# Patient Record
Sex: FEMALE | Race: WHITE | NOT HISPANIC OR LATINO | Employment: UNEMPLOYED | URBAN - NONMETROPOLITAN AREA
[De-identification: names, ages, dates, MRNs, and addresses within clinical notes are randomized per-mention and may not be internally consistent; named-entity substitution may affect disease eponyms.]

---

## 2017-01-06 DIAGNOSIS — I25.118 CORONARY ARTERY DISEASE OF NATIVE ARTERY OF NATIVE HEART WITH STABLE ANGINA PECTORIS (HCC): Primary | ICD-10-CM

## 2017-01-11 ENCOUNTER — HOSPITAL ENCOUNTER (OUTPATIENT)
Facility: HOSPITAL | Age: 72
Discharge: HOME | End: 2017-01-12
Attending: INTERNAL MEDICINE | Admitting: INTERNAL MEDICINE

## 2017-01-11 DIAGNOSIS — I25.118 CORONARY ARTERY DISEASE OF NATIVE ARTERY OF NATIVE HEART WITH STABLE ANGINA PECTORIS (HCC): ICD-10-CM

## 2017-01-11 LAB
ACT BLD: 152 SECONDS (ref 82–152)
ACT BLD: 167 SECONDS (ref 82–152)
ACT BLD: 204 SECONDS (ref 82–152)
ACT BLD: 214 SECONDS (ref 82–152)
ACT BLD: 224 SECONDS (ref 82–152)
ACT BLD: 250 SECONDS (ref 82–152)
ANION GAP SERPL CALCULATED.3IONS-SCNC: 8.1 MMOL/L (ref 3.6–11.2)
BUN BLD-MCNC: 14 MG/DL (ref 7–21)
BUN/CREAT SERPL: 15.6 (ref 7–25)
CALCIUM SPEC-SCNC: 9.8 MG/DL (ref 7.7–10)
CHLORIDE SERPL-SCNC: 109 MMOL/L (ref 99–112)
CO2 SERPL-SCNC: 28.9 MMOL/L (ref 24.3–31.9)
CREAT BLD-MCNC: 0.9 MG/DL (ref 0.43–1.29)
DEPRECATED RDW RBC AUTO: 42.3 FL (ref 37–54)
ERYTHROCYTE [DISTWIDTH] IN BLOOD BY AUTOMATED COUNT: 12.7 % (ref 11.5–14.5)
GFR SERPL CREATININE-BSD FRML MDRD: 62 ML/MIN/1.73
GLUCOSE BLD-MCNC: 158 MG/DL (ref 70–110)
HCT VFR BLD AUTO: 36.8 % (ref 37–47)
HGB BLD-MCNC: 12.1 G/DL (ref 12–16)
INR PPP: 0.99 (ref 0.8–1.1)
MCH RBC QN AUTO: 30.9 PG (ref 27–33)
MCHC RBC AUTO-ENTMCNC: 32.9 G/DL (ref 33–37)
MCV RBC AUTO: 94.1 FL (ref 80–94)
OSMOLALITY SERPL CALC.SUM OF ELEC: 294.3 MOSM/KG (ref 273–305)
PLATELET # BLD AUTO: 229 10*3/MM3 (ref 130–400)
PMV BLD AUTO: 10.1 FL (ref 6–10)
POTASSIUM BLD-SCNC: 3.3 MMOL/L (ref 3.5–5.3)
PROTHROMBIN TIME: 11.2 SECONDS (ref 9.8–11.9)
RBC # BLD AUTO: 3.91 10*6/MM3 (ref 4.2–5.4)
SODIUM BLD-SCNC: 146 MMOL/L (ref 135–153)
WBC NRBC COR # BLD: 9.54 10*3/MM3 (ref 4.5–12.5)

## 2017-01-11 PROCEDURE — 85610 PROTHROMBIN TIME: CPT | Performed by: INTERNAL MEDICINE

## 2017-01-11 PROCEDURE — C1725 CATH, TRANSLUMIN NON-LASER: HCPCS | Performed by: INTERNAL MEDICINE

## 2017-01-11 PROCEDURE — C1757 CATH, THROMBECTOMY/EMBOLECT: HCPCS | Performed by: INTERNAL MEDICINE

## 2017-01-11 PROCEDURE — 25010000002 MIDAZOLAM PER 1 MG: Performed by: INTERNAL MEDICINE

## 2017-01-11 PROCEDURE — C1887 CATHETER, GUIDING: HCPCS | Performed by: INTERNAL MEDICINE

## 2017-01-11 PROCEDURE — 0 IOPAMIDOL PER 1 ML: Performed by: INTERNAL MEDICINE

## 2017-01-11 PROCEDURE — C1769 GUIDE WIRE: HCPCS | Performed by: INTERNAL MEDICINE

## 2017-01-11 PROCEDURE — 25010000002 ONDANSETRON PER 1 MG: Performed by: INTERNAL MEDICINE

## 2017-01-11 PROCEDURE — 93005 ELECTROCARDIOGRAM TRACING: CPT | Performed by: INTERNAL MEDICINE

## 2017-01-11 PROCEDURE — 92928 PRQ TCAT PLMT NTRAC ST 1 LES: CPT | Performed by: INTERNAL MEDICINE

## 2017-01-11 PROCEDURE — C9601 PERC DRUG-EL COR STENT BRAN: HCPCS | Performed by: INTERNAL MEDICINE

## 2017-01-11 PROCEDURE — 25010000002 FENTANYL CITRATE (PF) 100 MCG/2ML SOLUTION: Performed by: INTERNAL MEDICINE

## 2017-01-11 PROCEDURE — C9600 PERC DRUG-EL COR STENT SING: HCPCS | Performed by: INTERNAL MEDICINE

## 2017-01-11 PROCEDURE — 92929 PR PRQ TRLUML CORONARY STENT W/ANGIO ADDL ART/BRNCH: CPT | Performed by: INTERNAL MEDICINE

## 2017-01-11 PROCEDURE — G0378 HOSPITAL OBSERVATION PER HR: HCPCS

## 2017-01-11 PROCEDURE — 25010000002 HYDRALAZINE PER 20 MG: Performed by: INTERNAL MEDICINE

## 2017-01-11 PROCEDURE — 94799 UNLISTED PULMONARY SVC/PX: CPT

## 2017-01-11 PROCEDURE — S0260 H&P FOR SURGERY: HCPCS | Performed by: INTERNAL MEDICINE

## 2017-01-11 PROCEDURE — 25010000002 MORPHINE SULFATE (PF) 2 MG/ML SOLUTION

## 2017-01-11 PROCEDURE — C1874 STENT, COATED/COV W/DEL SYS: HCPCS | Performed by: INTERNAL MEDICINE

## 2017-01-11 PROCEDURE — 80048 BASIC METABOLIC PNL TOTAL CA: CPT | Performed by: INTERNAL MEDICINE

## 2017-01-11 PROCEDURE — 85027 COMPLETE CBC AUTOMATED: CPT | Performed by: INTERNAL MEDICINE

## 2017-01-11 PROCEDURE — 25010000002 HEPARIN (PORCINE) PER 1000 UNITS: Performed by: INTERNAL MEDICINE

## 2017-01-11 PROCEDURE — C1894 INTRO/SHEATH, NON-LASER: HCPCS | Performed by: INTERNAL MEDICINE

## 2017-01-11 PROCEDURE — 85347 COAGULATION TIME ACTIVATED: CPT

## 2017-01-11 DEVICE — XIENCE ALPINE EVEROLIMUS ELUTING CORONARY STENT SYSTEM 2.25 MM X 23 MM / RAPID-EXCHANGE
Type: IMPLANTABLE DEVICE | Status: FUNCTIONAL
Brand: XIENCE ALPINE

## 2017-01-11 DEVICE — XIENCE ALPINE EVEROLIMUS ELUTING CORONARY STENT SYSTEM 3.50 MM X 18 MM / RAPID-EXCHANGE
Type: IMPLANTABLE DEVICE | Status: FUNCTIONAL
Brand: XIENCE ALPINE

## 2017-01-11 RX ORDER — SODIUM CHLORIDE 9 MG/ML
INJECTION, SOLUTION INTRAVENOUS CONTINUOUS PRN
Status: DISCONTINUED | OUTPATIENT
Start: 2017-01-11 | End: 2017-01-11 | Stop reason: HOSPADM

## 2017-01-11 RX ORDER — ATORVASTATIN CALCIUM 20 MG/1
20 TABLET, FILM COATED ORAL NIGHTLY
Status: DISCONTINUED | OUTPATIENT
Start: 2017-01-11 | End: 2017-01-12 | Stop reason: HOSPADM

## 2017-01-11 RX ORDER — SIMVASTATIN 40 MG
40 TABLET ORAL NIGHTLY
COMMUNITY
End: 2017-01-12 | Stop reason: HOSPADM

## 2017-01-11 RX ORDER — SENNA AND DOCUSATE SODIUM 50; 8.6 MG/1; MG/1
2 TABLET, FILM COATED ORAL DAILY
Status: CANCELLED | OUTPATIENT
Start: 2017-01-12

## 2017-01-11 RX ORDER — LISINOPRIL AND HYDROCHLOROTHIAZIDE 25; 20 MG/1; MG/1
1 TABLET ORAL DAILY
Status: DISCONTINUED | OUTPATIENT
Start: 2017-01-12 | End: 2017-01-11 | Stop reason: SDUPTHER

## 2017-01-11 RX ORDER — MIDAZOLAM HYDROCHLORIDE 1 MG/ML
INJECTION INTRAMUSCULAR; INTRAVENOUS AS NEEDED
Status: DISCONTINUED | OUTPATIENT
Start: 2017-01-11 | End: 2017-01-11 | Stop reason: HOSPADM

## 2017-01-11 RX ORDER — ASPIRIN 325 MG
325 TABLET, DELAYED RELEASE (ENTERIC COATED) ORAL DAILY
Status: DISCONTINUED | OUTPATIENT
Start: 2017-01-11 | End: 2017-01-12 | Stop reason: HOSPADM

## 2017-01-11 RX ORDER — HYDRALAZINE HYDROCHLORIDE 20 MG/ML
INJECTION INTRAMUSCULAR; INTRAVENOUS AS NEEDED
Status: DISCONTINUED | OUTPATIENT
Start: 2017-01-11 | End: 2017-01-11 | Stop reason: HOSPADM

## 2017-01-11 RX ORDER — NITROGLYCERIN 5 MG/ML
INJECTION, SOLUTION INTRAVENOUS AS NEEDED
Status: DISCONTINUED | OUTPATIENT
Start: 2017-01-11 | End: 2017-01-11 | Stop reason: HOSPADM

## 2017-01-11 RX ORDER — CLOPIDOGREL BISULFATE 75 MG/1
75 TABLET ORAL DAILY
Status: DISCONTINUED | OUTPATIENT
Start: 2017-01-12 | End: 2017-01-12 | Stop reason: HOSPADM

## 2017-01-11 RX ORDER — HYDROCODONE BITARTRATE AND ACETAMINOPHEN 10; 325 MG/1; MG/1
1 TABLET ORAL 3 TIMES DAILY PRN
Status: DISCONTINUED | OUTPATIENT
Start: 2017-01-11 | End: 2017-01-12 | Stop reason: HOSPADM

## 2017-01-11 RX ORDER — NITROGLYCERIN 0.4 MG/1
0.4 TABLET SUBLINGUAL
Status: DISCONTINUED | OUTPATIENT
Start: 2017-01-11 | End: 2017-01-12 | Stop reason: HOSPADM

## 2017-01-11 RX ORDER — ONDANSETRON 2 MG/ML
INJECTION INTRAMUSCULAR; INTRAVENOUS AS NEEDED
Status: DISCONTINUED | OUTPATIENT
Start: 2017-01-11 | End: 2017-01-11 | Stop reason: HOSPADM

## 2017-01-11 RX ORDER — SENNA AND DOCUSATE SODIUM 50; 8.6 MG/1; MG/1
2 TABLET, FILM COATED ORAL DAILY
COMMUNITY
End: 2018-03-19 | Stop reason: ALTCHOICE

## 2017-01-11 RX ORDER — LISINOPRIL 10 MG/1
20 TABLET ORAL
Status: DISCONTINUED | OUTPATIENT
Start: 2017-01-12 | End: 2017-01-12 | Stop reason: HOSPADM

## 2017-01-11 RX ORDER — LISINOPRIL AND HYDROCHLOROTHIAZIDE 25; 20 MG/1; MG/1
1 TABLET ORAL DAILY
COMMUNITY
End: 2018-03-19 | Stop reason: ALTCHOICE

## 2017-01-11 RX ORDER — ERGOCALCIFEROL 1.25 MG/1
50000 CAPSULE ORAL
Status: DISCONTINUED | OUTPATIENT
Start: 2017-01-13 | End: 2017-01-12 | Stop reason: HOSPADM

## 2017-01-11 RX ORDER — SODIUM CHLORIDE 0.9 % (FLUSH) 0.9 %
1-10 SYRINGE (ML) INJECTION AS NEEDED
Status: DISCONTINUED | OUTPATIENT
Start: 2017-01-11 | End: 2017-01-12 | Stop reason: HOSPADM

## 2017-01-11 RX ORDER — LIDOCAINE HYDROCHLORIDE 20 MG/ML
INJECTION, SOLUTION INFILTRATION; PERINEURAL AS NEEDED
Status: DISCONTINUED | OUTPATIENT
Start: 2017-01-11 | End: 2017-01-11 | Stop reason: HOSPADM

## 2017-01-11 RX ORDER — SODIUM CHLORIDE 9 MG/ML
100 INJECTION, SOLUTION INTRAVENOUS CONTINUOUS
Status: DISCONTINUED | OUTPATIENT
Start: 2017-01-11 | End: 2017-01-12 | Stop reason: HOSPADM

## 2017-01-11 RX ORDER — MORPHINE SULFATE 2 MG/ML
2 INJECTION, SOLUTION INTRAMUSCULAR; INTRAVENOUS
Status: DISCONTINUED | OUTPATIENT
Start: 2017-01-11 | End: 2017-01-12 | Stop reason: HOSPADM

## 2017-01-11 RX ORDER — MORPHINE SULFATE 2 MG/ML
INJECTION, SOLUTION INTRAMUSCULAR; INTRAVENOUS
Status: COMPLETED
Start: 2017-01-11 | End: 2017-01-11

## 2017-01-11 RX ORDER — DIAZEPAM 2 MG/1
5 TABLET ORAL DAILY
Status: ON HOLD | COMMUNITY
End: 2018-08-11

## 2017-01-11 RX ORDER — ATORVASTATIN CALCIUM 20 MG/1
20 TABLET, FILM COATED ORAL NIGHTLY
Status: DISCONTINUED | OUTPATIENT
Start: 2017-01-11 | End: 2017-01-11 | Stop reason: SDUPTHER

## 2017-01-11 RX ORDER — HYDROCHLOROTHIAZIDE 25 MG/1
25 TABLET ORAL DAILY
Status: DISCONTINUED | OUTPATIENT
Start: 2017-01-12 | End: 2017-01-12 | Stop reason: HOSPADM

## 2017-01-11 RX ORDER — HEPARIN SODIUM 1000 [USP'U]/ML
INJECTION, SOLUTION INTRAVENOUS; SUBCUTANEOUS AS NEEDED
Status: DISCONTINUED | OUTPATIENT
Start: 2017-01-11 | End: 2017-01-11 | Stop reason: HOSPADM

## 2017-01-11 RX ORDER — DIAZEPAM 2 MG/1
2 TABLET ORAL DAILY
Status: DISCONTINUED | OUTPATIENT
Start: 2017-01-12 | End: 2017-01-12 | Stop reason: HOSPADM

## 2017-01-11 RX ORDER — ISOSORBIDE MONONITRATE 30 MG/1
30 TABLET, EXTENDED RELEASE ORAL EVERY MORNING
Status: DISCONTINUED | OUTPATIENT
Start: 2017-01-12 | End: 2017-01-12 | Stop reason: HOSPADM

## 2017-01-11 RX ORDER — ACETAMINOPHEN 325 MG/1
650 TABLET ORAL EVERY 4 HOURS PRN
Status: DISCONTINUED | OUTPATIENT
Start: 2017-01-11 | End: 2017-01-12 | Stop reason: HOSPADM

## 2017-01-11 RX ORDER — DIAZEPAM 5 MG/1
5 TABLET ORAL ONCE
Status: COMPLETED | OUTPATIENT
Start: 2017-01-11 | End: 2017-01-11

## 2017-01-11 RX ORDER — FENTANYL CITRATE 50 UG/ML
INJECTION, SOLUTION INTRAMUSCULAR; INTRAVENOUS AS NEEDED
Status: DISCONTINUED | OUTPATIENT
Start: 2017-01-11 | End: 2017-01-11 | Stop reason: HOSPADM

## 2017-01-11 RX ORDER — GABAPENTIN 400 MG/1
400 CAPSULE ORAL EVERY 8 HOURS SCHEDULED
Status: DISCONTINUED | OUTPATIENT
Start: 2017-01-11 | End: 2017-01-12 | Stop reason: HOSPADM

## 2017-01-11 RX ADMIN — DIAZEPAM 5 MG: 5 TABLET ORAL at 12:10

## 2017-01-11 RX ADMIN — MORPHINE SULFATE 2 MG: 2 INJECTION, SOLUTION INTRAMUSCULAR; INTRAVENOUS at 20:36

## 2017-01-11 RX ADMIN — HYDROCODONE BITARTRATE AND ACETAMINOPHEN 1 TABLET: 10; 325 TABLET ORAL at 23:16

## 2017-01-11 RX ADMIN — GABAPENTIN 400 MG: 400 CAPSULE ORAL at 17:22

## 2017-01-11 RX ADMIN — GABAPENTIN 400 MG: 400 CAPSULE ORAL at 20:28

## 2017-01-11 RX ADMIN — METOPROLOL TARTRATE 25 MG: 25 TABLET, FILM COATED ORAL at 17:22

## 2017-01-11 RX ADMIN — HYDROCODONE BITARTRATE AND ACETAMINOPHEN 1 TABLET: 10; 325 TABLET ORAL at 16:03

## 2017-01-11 RX ADMIN — SODIUM CHLORIDE 100 ML/HR: 9 INJECTION, SOLUTION INTRAVENOUS at 16:15

## 2017-01-11 RX ADMIN — SODIUM CHLORIDE 100 ML/HR: 9 INJECTION, SOLUTION INTRAVENOUS at 17:22

## 2017-01-11 RX ADMIN — ATORVASTATIN CALCIUM 20 MG: 20 TABLET, FILM COATED ORAL at 20:28

## 2017-01-11 NOTE — PLAN OF CARE
Problem: Patient Care Overview (Adult)  Goal: Plan of Care Review  Outcome: Ongoing (interventions implemented as appropriate)  Goal: Adult Individualization and Mutuality  Outcome: Ongoing (interventions implemented as appropriate)  Goal: Discharge Needs Assessment  Outcome: Ongoing (interventions implemented as appropriate)    Problem: Cardiac Catheterization with/without PCI (Adult)  Goal: Signs and Symptoms of Listed Potential Problems Will be Absent or Manageable (Cardiac Catheterization with/without PCI)  Outcome: Ongoing (interventions implemented as appropriate)

## 2017-01-11 NOTE — Clinical Note
1000 ml of heparin flush 2 units/ml added to back table  500 ml of heparin flush 2 units/ml added to manifold  50 ml of contrast added to back table  30 ml of nitroglycerin 100 mcg/ml added to back table

## 2017-01-11 NOTE — IP AVS SNAPSHOT
AFTER VISIT SUMMARY             Emily Roberto           About your hospitalization     You were admitted on:  January 11, 2017 You last received care in the:  [facility] CR[facility]       Procedures & Surgeries      Procedure(s) (LRB):  Left Heart Cath/STENT RCA (N/A)     1/11/2017     Surgeon(s):  Nate Butler MD  -------------------      Medications    If you or your caregiver advised us that you are currently taking a medication and that medication is marked below as “Resume”, this simply indicates that we have reviewed those medications to make sure our new therapy recommendations do not interfere.  If you have concerns about medications other than those new ones which we are prescribing today, please consult the physician who prescribed them (or your primary physician).  Our review of your home medications is not meant to indicate that we are directing their use.             Your Medications      CONTINUE taking these medications     aspirin 325 MG EC tablet   Take 1 tablet by mouth Daily.   Last time this was given:  1/12/2017  8:22 AM           atorvastatin 20 MG tablet   Take 1 tablet by mouth Every Night.   Last time this was given:  1/11/2017  8:28 PM   Commonly known as:  LIPITOR           clopidogrel 75 MG tablet   Take 1 tablet by mouth Daily.   Last time this was given:  1/12/2017  8:22 AM   Commonly known as:  PLAVIX           diazePAM 2 MG tablet   Take 2 mg by mouth Daily.   Last time this was given:  1/12/2017  8:23 AM   Commonly known as:  VALIUM           gabapentin 800 MG tablet   Take 800 mg by mouth 3 (Three) Times a Day.   Commonly known as:  NEURONTIN           HYDROcodone-acetaminophen  MG per tablet   Take 1 tablet by mouth 3 (Three) Times a Day As Needed for moderate pain (4-6). Patient had a ? tablet this morning.   Last time this was given:  1/12/2017  5:00 AM   Commonly known as:  NORCO           isosorbide mononitrate 30 MG 24 hr tablet   Take 1 tablet  by mouth Every Morning.   Last time this was given:  1/12/2017  5:01 AM   Commonly known as:  IMDUR           lisinopril-hydrochlorothiazide 20-25 MG per tablet   Take 1 tablet by mouth Daily.   Commonly known as:  PRINZIDE,ZESTORETIC           metFORMIN 500 MG tablet   Take 500 mg by mouth 2 (Two) Times a Day With Meals.   Commonly known as:  GLUCOPHAGE           metoprolol tartrate 25 MG tablet   Take 1 tablet by mouth 2 (Two) Times a Day.   Last time this was given:  1/12/2017  8:22 AM   Commonly known as:  LOPRESSOR           nitroglycerin 0.4 MG SL tablet   Place 0.4 mg under the tongue Every 5 (Five) Minutes As Needed for chest pain. Take no more than 3 doses in 15 minutes.   Commonly known as:  NITROSTAT           sennosides-docusate sodium 8.6-50 MG tablet   Take 2 tablets by mouth Daily.   Commonly known as:  SENOKOT-S           vitamin D 40418 UNITS capsule capsule   Take 50,000 Units by mouth Every 7 (Seven) Days. Patient takes on friday   Commonly known as:  ERGOCALCIFEROL             STOP taking these medications     simvastatin 40 MG tablet   Commonly known as:  ZOCOR                      Your Medications      Your Medication List           Morning Noon Evening Bedtime As Needed    aspirin 325 MG EC tablet   Take 1 tablet by mouth Daily.                                atorvastatin 20 MG tablet   Take 1 tablet by mouth Every Night.   Commonly known as:  LIPITOR                                clopidogrel 75 MG tablet   Take 1 tablet by mouth Daily.   Commonly known as:  PLAVIX                                diazePAM 2 MG tablet   Take 2 mg by mouth Daily.   Commonly known as:  VALIUM                                gabapentin 800 MG tablet   Take 800 mg by mouth 3 (Three) Times a Day.   Commonly known as:  NEURONTIN                                HYDROcodone-acetaminophen  MG per tablet   Take 1 tablet by mouth 3 (Three) Times a Day As Needed for moderate pain (4-6). Patient had a ? tablet this  morning.   Commonly known as:  NORCO                                isosorbide mononitrate 30 MG 24 hr tablet   Take 1 tablet by mouth Every Morning.   Commonly known as:  IMDUR                                lisinopril-hydrochlorothiazide 20-25 MG per tablet   Take 1 tablet by mouth Daily.   Commonly known as:  PRINZIDE,ZESTORETIC                                metFORMIN 500 MG tablet   Take 500 mg by mouth 2 (Two) Times a Day With Meals.   Commonly known as:  GLUCOPHAGE                                metoprolol tartrate 25 MG tablet   Take 1 tablet by mouth 2 (Two) Times a Day.   Commonly known as:  LOPRESSOR                                nitroglycerin 0.4 MG SL tablet   Place 0.4 mg under the tongue Every 5 (Five) Minutes As Needed for chest pain. Take no more than 3 doses in 15 minutes.   Commonly known as:  NITROSTAT                                sennosides-docusate sodium 8.6-50 MG tablet   Take 2 tablets by mouth Daily.   Commonly known as:  SENOKOT-S                                vitamin D 81742 UNITS capsule capsule   Take 50,000 Units by mouth Every 7 (Seven) Days. Patient takes on friday   Commonly known as:  ERGOCALCIFEROL                                         Instructions for After Discharge        Discharge References/Attachments     CARDIAC REHABILITATION (ENGLISH)       Follow-ups for After Discharge        Follow-up Information     Follow up with Nate Butler MD Follow up in 4 week(s).    Specialty:  Cardiology    Contact information:    [address] [address] [phone]        [employer] Signup     Our records indicate that you have declined [employer] signup. If you would like to sign up for [employer], please email [email or web address] or call [phone] to obtain an activation code.         Summary of Your Hospitalization        Reason for Hospitalization     Your primary diagnosis was:  Not on File    Your diagnoses also included:  Coronary Artery  Disease Of Native Artery Of Native Heart With Stable Angina Pectoris      Care Providers     Provider Service Role Specialty    Nate Butler MD Cardiology Attending Provider Cardiology    Mathieu Vera DO -- Consulting Physician  Interventional Cardiology       Your Allergies  Date Reviewed: 1/11/2017    No active allergies      Patient Belongings Returned     Document Return of Belongings Flowsheet     Were the patient bedside belongings sent home?   Yes   Belongings Retrieved from Security & Sent Home   N/A    Belongings Sent to Safe   --   Medications Retrieved from Pharmacy & Sent Home   N/A              More Information      Cardiac Rehabilitation  Cardiac rehabilitation is a medically supervised program that helps improve the health and well-being of people with heart problems. Cardiac rehabilitation includes exercise training, education, and counseling to help you get stronger and return to an active lifestyle. People who participate in cardiac rehabilitation programs get better faster and reduce future hospital stays.  Cardiac rehabilitation programs can help when you have had the following conditions:    Outpatient Cardiac Rehab  [place] [address]  Phone number: [phone]  · Heart attack.  · Heart failure.  · Peripheral artery disease.  · Coronary artery disease.  · Angina.  · Lung or breathing problems.  Cardiac rehabilitation programs are also used when you have the following procedures:  · Coronary artery bypass graft surgery.  · Heart valve replacement.  · Heart stent placement.  · Heart transplant.  · Aneurysm repair.  CARDIAC REHABILITATION MAY HELP YOU:  · Reduce problems like chest pain and trouble breathing.  · Change risk factors that contribute to heart disease, such as:    Smoking.    High blood pressure.    High cholesterol.    Diabetes.    Being out of shape or not active.    Weighing more than 30% over your ideal weight.    Diet.  · Improve your  "mental outlook so you feel:    Less depressed or \"blue.\"    More hopeful.    Better about yourself.    More confident about taking care of yourself.  · Get support from health experts as well as other people with similar problems.  · Learn how to manage and understand your medicines.  · Teach your family about your condition and how to participate in your recovery.  WHAT HAPPENS IN CARDIAC REHABILITATION?  You will be assessed by a cardiac rehabilitation team. They will check your health history and do a physical exam. You may need blood tests, stress tests, and other evaluations. You may not start a cardiac rehabilitation program if:  · You develop angina with exercise or while at rest.  · You have severe heart failure that limits your activity.  · You have an abnormal heart rhythm at rest.  · You develop heart rhythm problems during exercise.  · You have high blood pressure that is not controlled.  The cardiac rehabilitation team works with you to make a plan based on your health and goals. Everyone is unique, so each program is customized and your program may change as you progress. Members of a typical cardiac rehabilitation team may include such health professionals as:  · Doctors.  · Nurses.  · Dietitians.  · Psychologists.  · Exercise specialists.  · Physical and occupational therapists.  A typical cardiac rehabilitation program is divided into phases. You advance from one phase to the next. Most cardiac rehabilitation sessions last for 60 minutes, 3 times a week.   Phase One starts while you are still in the hospital. You may start by walking in your room and then in the hartman. You may start some simple exercises with a therapist. Health care team members will give you information and ask you many questions. You may not be able to remember details, so have a family member or an advocate with you to help keep track of information.   Phase Two begins when you go home or to another facility. This phase may last " "8 to 12 weeks. You will travel to a cardiac rehabilitation center or a place where it is offered. Typically, you gradually increase your activity while being closely watched by a nurse or therapist. Exercises may be a combination of strength or resistance training and \"cardio\" or aerobic movement on a treadmill or other machines. Your condition will determine how often and how long these sessions will last.   In phase two, you may learn how to cook healthy meals, control your blood sugar, and manage your medicines. You may need help with scheduling or planning how and when to take your medicines. Use a timer, divided pill box, or follow a form to make taking your medicines easier. Use the method that works best for you. Some medicines should not be taken with certain foods. If you take more than one blood pressure medicine, you may need to stagger the times you take them. Taking all your blood pressure medicine at the same time may lower your blood pressure too much. If you have questions about your medicines, ask your health care provider questions until you understand.   Phase Three continues for the rest of your life. There will be less supervision. You may still participate in cardiac rehabilitation activities or become part of a group in your community. You may benefit from talking to other people about your experience if they are facing similar challenges.  How soon you drive, have sex, or return to work will depend on your condition. These decisions should be made by you and your health care provider. If you need help, ask for it. Find out where you can get the help you need. Ask questions until you get answers and understand.  SEEK IMMEDIATE MEDICAL CARE IF:   Get medical help at once if you experience any of the following symptoms:  · Severe chest discomfort, especially if the pain is crushing or pressure-like and spreads to the arms, back, neck, or jaw. Do not wait to see if the pain will go " away.  · Weakness or numbness in your face, arms, or legs, especially on one side of the body; slurred speech; confusion; sudden severe headache or loss of vision (all symptoms of stroke).  · You have shortness of breath.  · You are sweating and feel sick to your stomach (nausea).  · You feel dizzy or faint.  · You experience profound tiredness (fatigue).  Call your local emergency service (911 in the [place] Do not drive yourself to the hospital.     This information is not intended to replace advice given to you by your health care provider. Make sure you discuss any questions you have with your health care provider.     Document Released: 09/26/2009 Document Revised: 01/08/2016 Document Reviewed: 03/23/2012  [employer] Interactive Patient Education ©2016 [employer]         PREVENTING SURGICAL SITE INFECTIONS     Surgical Site Infections FAQs  What is a Surgical Site Infection (SSI)?  A surgical site infection is an infection that occurs after surgery in the part of the body where the surgery took place. Most patients who have surgery do not develop an infection. However, infections develop in about 1 to 3 out of every 100 patients who have surgery.  Some of the common symptoms of a surgical site infection are:  · Redness and pain around the area where you had surgery  · Drainage of cloudy fluid from your surgical wound  · Fever  Can SSIs be treated?  Yes. Most surgical site infections can be treated with antibiotics. The antibiotic given to you depends on the bacteria (germs) causing the infection. Sometimes patients with SSIs also need another surgery to treat the infection.  What are some of the things that hospitals are doing to prevent SSIs?  To prevent SSIs, doctors, nurses, and other healthcare providers:  · Clean their hands and arms up to their elbows with an antiseptic agent just before the surgery.  · Clean their hands with soap and water or an alcohol-based hand rub before and after caring for each  patient.  · May remove some of your hair immediately before your surgery using electric clippers if the hair is in the same area where the procedure will occur. They should not shave you with a razor.  · Wear special hair covers, masks, gowns, and gloves during surgery to keep the surgery area clean.  · Give you antibiotics before your surgery starts. In most cases, you should get antibiotics within 60 minutes before the surgery starts and the antibiotics should be stopped within 24 hours after surgery.  · Clean the skin at the site of your surgery with a special soap that kills germs.  What can I do to help prevent SSIs?  Before your surgery:  · Tell your doctor about other medical problems you may have. Health problems such as allergies, diabetes, and obesity could affect your surgery and your treatment.  · Quit smoking. Patients who smoke get more infections. Talk to your doctor about how you can quit before your surgery.  · Do not shave near where you will have surgery. Shaving with a razor can irritate your skin and make it easier to develop an infection.  At the time of your surgery:  · Speak up if someone tries to shave you with a razor before surgery. Ask why you need to be shaved and talk with your surgeon if you have any concerns.  · Ask if you will get antibiotics before surgery.  After your surgery:  · Make sure that your healthcare providers clean their hands before examining you, either with soap and water or an alcohol-based hand rub.    If you do not see your providers clean their hands, please ask them to do so.  · Family and friends who visit you should not touch the surgical wound or dressings.  · Family and friends should clean their hands with soap and water or an alcohol-based hand rub before and after visiting you. If you do not see them clean their hands, ask them to clean their hands.  What do I need to do when I go home from the hospital?  · Before you go home, your doctor or nurse should  explain everything you need to know about taking care of your wound. Make sure you understand how to care for your wound before you leave the hospital.  · Always clean your hands before and after caring for your wound.  · Before you go home, make sure you know who to contact if you have questions or problems after you get home.  · If you have any symptoms of an infection, such as redness and pain at the surgery site, drainage, or fever, call your doctor immediately.  If you have additional questions, please ask your doctor or nurse.  Developed and co-sponsored by The Society for Healthcare Epidemiology of [place] (SHEA); Infectious Diseases Society of [place] (IDSA); American Hospital Association; Association for Professionals in Infection Control and Epidemiology (APIC); Centers for Disease Control and Prevention (CDC); and The Joint Commission.     This information is not intended to replace advice given to you by your health care provider. Make sure you discuss any questions you have with your health care provider.     Document Released: 12/23/2014 Document Revised: 01/08/2016 Document Reviewed: 03/02/2016  [employer] Interactive Patient Education ©2016 [employer]             SYMPTOMS OF A STROKE    Call 911 or have someone take you to the Emergency Department if you have any of the following:    · Sudden numbness or weakness of your face, arm or leg especially on one side of the body  · Sudden confusion, diffiiculty speaking or trouble understanding   · Changes in your vision or loss of sight in one eye  · Sudden severe headache with no known cause  · sudden dizziness, trouble walking, loss of balance or coordination    It is important to seek emergency care right away if you have further stroke symptoms. If you get emergency help quickly, the powerful clot-dissolving medicines can reduce the disabilities caused by a stroke.     For more information:    American Stroke  Association  6-615-3-STROKE  www.strokeassociation.org           IF YOU SMOKE OR USE TOBACCO PLEASE READ THE FOLLOWING:    Why is smoking bad for me?  Smoking increases the risk of heart disease, lung disease, vascular disease, stroke, and cancer.     If you smoke, STOP!    If you would like more information on quitting smoking, please visit the [employer] website: [email or web address]   This link will provide additional resources including the QUIT line and the Beat the Pack support groups.     For more information:    American Cancer Society  (798) 674-1378    American Heart Association  1-800.560.4772               YOU ARE THE MOST IMPORTANT FACTOR IN YOUR RECOVERY.     Follow all instructions carefully.     I have reviewed my discharge instructions with my nurse, including the following information, if applicable:     Information about my illness and diagnosis   Follow up appointments (including lab draws)   Wound Care   Equipment Needs   Medications (new and continuing) along with side effects   Preventative information such as vaccines and smoking cessations   Diet   Pain   I know when to contact my Doctor's office or seek emergency care      I want my nurse to describe the side effects of my medications: YES NO   If the answer is no, I understand the side effects of my medications: YES NO   My nurse described the side effects of my medications in a way that I could understand: YES NO   I have taken my personal belongings and my own medications with me at discharge: YES NO            I have received this information and my questions have been answered. I have discussed any concerns I see with this plan with the nurse or physician. I understand these instructions.    Signature of Patient or Responsible Person: _____________________________________    Date: _________________  Time: __________________    Signature of Healthcare Provider:  _______________________________________  Date: _________________  Time: __________________

## 2017-01-11 NOTE — Clinical Note
Suture was used to secure the sheath post procedure. Transparent Dressing was used to secure the sheath post procedure.  Pressure Bag was used to stabalize the sheath post procedure.

## 2017-01-11 NOTE — H&P
History of Present Illness      Emily is a pleasant 71-year-old woman who presents for evaluation of chest discomfort. She does have the chest discomfort in November 2016 with typical angina and an abnormal stress test suggestive of ischemia of the anterior territory.  The stress test was felt to be moderate risk.  She did undergo cardiac catheterization which revealed significant disease involving the LAD and right coronary artery.  She was referred to [facility] for bypass surgery but was felt to be better treated by stenting.  She underwent stenting of the LAD with a planned staged intervention to the right coronary artery and presents today for that staged intervention.     The following portions of the patient's history were reviewed and updated as appropriate: allergies, current medications, past family history, past medical history, past social history, past surgical history and problem list.     Review of Systems   Constitutional: Positive for fatigue. Negative for activity change and appetite change.   HENT: Negative for congestion and tinnitus.   Eyes: Negative for visual disturbance.   Respiratory: Positive for shortness of breath. Negative for cough and chest tightness.   Cardiovascular: Positive for chest pain. Negative for leg swelling.   Gastrointestinal: Positive for nausea and vomiting. Negative for blood in stool.   Endocrine: Negative for cold intolerance, heat intolerance and polyuria.   Genitourinary: Negative for dysuria.   Musculoskeletal: Negative for myalgias and neck pain.   Skin: Negative for rash.   Neurological: Positive for dizziness, weakness and light-headedness. Negative for syncope.   Hematological: Does not bruise/bleed easily.   Psychiatric/Behavioral: Positive for sleep disturbance. Negative for confusion.         Objective   Physical Exam   Constitutional: She is oriented to person, place, and time. She appears well-developed and well-nourished. No distress.    HENT:   Head: Normocephalic and atraumatic.   Nose: Nose normal.   Mouth/Throat: Oropharynx is clear and moist.   Eyes: Conjunctivae and EOM are normal. Right eye exhibits no discharge. Left eye exhibits no discharge. No scleral icterus.   Neck: Normal range of motion. No hepatojugular reflux and no JVD present. Carotid bruit is not present. No tracheal deviation present.   Cardiovascular: Normal rate, regular rhythm, S1 normal, S2 normal and intact distal pulses. PMI is not displaced. Exam reveals no gallop, no S3, no S4 and no friction rub.   No murmur heard.  Pulmonary/Chest: Effort normal and breath sounds normal. No accessory muscle usage. No respiratory distress. She has no wheezes. She has no rales. She exhibits no tenderness.   Abdominal: Soft. Bowel sounds are normal. She exhibits no distension. There is no tenderness.   Musculoskeletal: Normal range of motion. She exhibits no edema or tenderness.     Vascular Status - Her exam exhibits no right foot edema. Her exam exhibits no left foot edema.  Neurological: She is alert and oriented to person, place, and time. No cranial nerve deficit. Coordination normal.   Skin: Skin is warm and dry. She is not diaphoretic.   Nursing note and vitals reviewed.          ECG 12 Lead  Date/Time: 11/22/2016 8:29 AM  Performed by: JOSE A DIEHL  Authorized by: JOSE A DIEHL   Comments: EKG today demonstrates sinus tachycardia with normal intervals and durations. There are no acute or chronic ischemic changes noted.       stress test performed at [facility] demonstrates a significant ischemic defect in the anterior wall. She is noted to have preserved overall LV systolic function.     Assessment/Plan   CAD    In regard to her history of coronary artery disease, she does have significant disease involving the right coronary artery and we will proceed with a staged percutaneous or vascular station as planned.  I discussed the risks and benefits of this with Mrs.  Felisa.  She understands and agrees to proceed.

## 2017-01-12 VITALS
WEIGHT: 216 LBS | DIASTOLIC BLOOD PRESSURE: 71 MMHG | BODY MASS INDEX: 39.75 KG/M2 | RESPIRATION RATE: 21 BRPM | SYSTOLIC BLOOD PRESSURE: 108 MMHG | TEMPERATURE: 98.3 F | HEIGHT: 62 IN | HEART RATE: 71 BPM | OXYGEN SATURATION: 95 %

## 2017-01-12 LAB
ALBUMIN SERPL-MCNC: 3.7 G/DL (ref 3.4–4.8)
ALBUMIN/GLOB SERPL: 1.4 G/DL (ref 1.5–2.5)
ALP SERPL-CCNC: 51 U/L (ref 46–116)
ALT SERPL W P-5'-P-CCNC: 16 U/L (ref 10–36)
ANION GAP SERPL CALCULATED.3IONS-SCNC: 5.5 MMOL/L (ref 3.6–11.2)
AST SERPL-CCNC: 29 U/L (ref 10–30)
BILIRUB SERPL-MCNC: 0.4 MG/DL (ref 0.2–1.8)
BNP SERPL-MCNC: 224 PG/ML (ref 0–100)
BUN BLD-MCNC: 13 MG/DL (ref 7–21)
BUN/CREAT SERPL: 13.8 (ref 7–25)
CALCIUM SPEC-SCNC: 9.2 MG/DL (ref 7.7–10)
CHLORIDE SERPL-SCNC: 109 MMOL/L (ref 99–112)
CO2 SERPL-SCNC: 28.5 MMOL/L (ref 24.3–31.9)
CREAT BLD-MCNC: 0.94 MG/DL (ref 0.43–1.29)
DEPRECATED RDW RBC AUTO: 42.8 FL (ref 37–54)
ERYTHROCYTE [DISTWIDTH] IN BLOOD BY AUTOMATED COUNT: 12.8 % (ref 11.5–14.5)
GFR SERPL CREATININE-BSD FRML MDRD: 59 ML/MIN/1.73
GLOBULIN UR ELPH-MCNC: 2.6 GM/DL
GLUCOSE BLD-MCNC: 141 MG/DL (ref 70–110)
HCT VFR BLD AUTO: 32.3 % (ref 37–47)
HGB BLD-MCNC: 10.4 G/DL (ref 12–16)
INR PPP: 1.06 (ref 0.8–1.1)
MCH RBC QN AUTO: 30.7 PG (ref 27–33)
MCHC RBC AUTO-ENTMCNC: 32.2 G/DL (ref 33–37)
MCV RBC AUTO: 95.3 FL (ref 80–94)
OSMOLALITY SERPL CALC.SUM OF ELEC: 287.5 MOSM/KG (ref 273–305)
PLATELET # BLD AUTO: 207 10*3/MM3 (ref 130–400)
PMV BLD AUTO: 10.4 FL (ref 6–10)
POTASSIUM BLD-SCNC: 3.7 MMOL/L (ref 3.5–5.3)
PROT SERPL-MCNC: 6.3 G/DL (ref 6–8)
PROTHROMBIN TIME: 12 SECONDS (ref 9.8–11.9)
RBC # BLD AUTO: 3.39 10*6/MM3 (ref 4.2–5.4)
SODIUM BLD-SCNC: 143 MMOL/L (ref 135–153)
TSH SERPL DL<=0.05 MIU/L-ACNC: 0.13 MIU/ML (ref 0.55–4.78)
WBC NRBC COR # BLD: 6.81 10*3/MM3 (ref 4.5–12.5)

## 2017-01-12 PROCEDURE — G0378 HOSPITAL OBSERVATION PER HR: HCPCS

## 2017-01-12 PROCEDURE — 80053 COMPREHEN METABOLIC PANEL: CPT | Performed by: INTERNAL MEDICINE

## 2017-01-12 PROCEDURE — 93005 ELECTROCARDIOGRAM TRACING: CPT | Performed by: INTERNAL MEDICINE

## 2017-01-12 PROCEDURE — 85027 COMPLETE CBC AUTOMATED: CPT | Performed by: INTERNAL MEDICINE

## 2017-01-12 PROCEDURE — 84443 ASSAY THYROID STIM HORMONE: CPT | Performed by: INTERNAL MEDICINE

## 2017-01-12 PROCEDURE — 99213 OFFICE O/P EST LOW 20 MIN: CPT | Performed by: INTERNAL MEDICINE

## 2017-01-12 PROCEDURE — 85610 PROTHROMBIN TIME: CPT | Performed by: INTERNAL MEDICINE

## 2017-01-12 PROCEDURE — 83880 ASSAY OF NATRIURETIC PEPTIDE: CPT | Performed by: INTERNAL MEDICINE

## 2017-01-12 RX ADMIN — HYDROCODONE BITARTRATE AND ACETAMINOPHEN 1 TABLET: 10; 325 TABLET ORAL at 05:00

## 2017-01-12 RX ADMIN — ISOSORBIDE MONONITRATE 30 MG: 30 TABLET, EXTENDED RELEASE ORAL at 05:01

## 2017-01-12 RX ADMIN — DIAZEPAM 2 MG: 2 TABLET ORAL at 08:23

## 2017-01-12 RX ADMIN — CLOPIDOGREL BISULFATE 75 MG: 75 TABLET, FILM COATED ORAL at 08:22

## 2017-01-12 RX ADMIN — GABAPENTIN 400 MG: 400 CAPSULE ORAL at 05:01

## 2017-01-12 RX ADMIN — HYDROCHLOROTHIAZIDE 25 MG: 25 TABLET ORAL at 08:23

## 2017-01-12 RX ADMIN — METOPROLOL TARTRATE 25 MG: 25 TABLET, FILM COATED ORAL at 08:22

## 2017-01-12 RX ADMIN — ASPIRIN 325 MG: 325 TABLET, COATED ORAL at 08:22

## 2017-01-12 RX ADMIN — LISINOPRIL 20 MG: 10 TABLET ORAL at 08:22

## 2017-01-12 NOTE — NURSING NOTE
Discharge papers given and explained; PCI medications provided per pharmacy. Pt stable at this time; all lines removed and transport at bedside to take patient to lobby.

## 2017-01-12 NOTE — CONSULTS
Time In 1150 Time Out 1210      Order received for Cardiac Rehab Consultation.     Patient will be scheduled at [facility]      Information discussed with: Patient/Family        Educated on: Benefits of Exercise,  Educated on Cardiac Rehab and Program Protocol, Brochure and/or educational material provided, Contact information given and Teach Back Verified    Comments: Spoke with pt and daughters. Pt interested in participating, yet concerned about transportation.  Family stated they will be able to provide transportation.  Pt states would like to d/w family.  Will call pt next week to schedule.       Thank you for the referral. Please contact the Cardiac Rehab Dept. (ext. [phone]) with any further questions or concerns.

## 2017-01-12 NOTE — PROGRESS NOTES
Discharge Planning Assessment   [facility]     Patient Name: Emily Roberto  MRN: [id number]  Today's Date: 1/12/2017    Admit Date: 1/11/2017          Discharge Needs Assessment       01/12/17 1126    Living Environment    Lives With child(flora), adult    Living Arrangements house    Provides Primary Care For no one, unable/limited ability to care for self    Quality Of Family Relationships supportive    Able to Return to Prior Living Arrangements yes    Discharge Needs Assessment    Concerns To Be Addressed no discharge needs identified    Readmission Within The Last 30 Days planned readmission    Anticipated Changes Related to Illness none    Equipment Currently Used at Home oxygen   Oxygen 2L via [employer]    Equipment Needed After Discharge none;shower chair   Pt requested a shower chair and sprayer and explained that insurance would not cover those items.  Pt and her daughter voiced understanding.  Made them aware of pricing.    Transportation Available car;family or friend will provide    Discharge Disposition home or self-care            Discharge Plan       01/12/17 1123    Case Management/Social Work Plan    Plan She lives at home with her daughter Elizabeth Glasgow and plans to return home at d/c.  She has oxygen via [employer] and S/S contacting for additional info needed,  Her sat dropped to 79% with ambulation on RA.      Patient/Family In Agreement With Plan yes    Additional Comments She came in for planned staged intervention and heart cath and stent RCA.  She has been d/c home and her daughter Mark is here to drive her. Cardiac rehab to see prior to d/c.     Final Note    Final Note Pt will be d/c home today.         Discharge Placement     No information found        Expected Discharge Date and Time     Expected Discharge Date Expected Discharge Time    Jan 12, 2017               Demographic Summary       01/12/17 1047    Referral Information    Admission Type observation    Arrived From --   Elective  heart cath today for staged intervention and stent RCA..    Referral Source admission list    Reason For Consult discharge planning    Record Reviewed medical record;history and physical    Primary Care Physician Information    Name Maxi García            Functional Status       01/12/17 1053    Functional Status Current    Current Functional Level Comment She has been out of bed and ambulated in the unit.     Change in Functional Status Since Onset of Current Illness/Injury no    Functional Status Prior    Prior Functional Level Comment She has been independent with ADL's prior to admission.     IADL    Medications assistive person   Her daughter sets up her medication for the week.     Activity Tolerance    Usual Activity Tolerance good    Current Activity Tolerance moderate    Cognitive/Perceptual/Developmental    Current Mental Status/Cognitive Functioning no deficits noted    Recent Changes in Mental Status/Cognitive Functioning no changes    Employment/Financial    Employment/Finance Comments SHe has Medicare and stated her oxygen provider called and said they needed additional information so insurance will pay.  Discussed with S/S and she will call [employer] and see what info needed.              Psychosocial     None            Abuse/Neglect     None            Legal       01/12/17 1122    Legal    Legal Comments She does not have AD or POA but requested information.  Pamphlet given and she plans to discuss with her family.              Substance Abuse     None            Patient Forms     None          Tennille Rivera RN

## 2017-01-12 NOTE — PROGRESS NOTES
Discharge Planning Assessment   [facility]     Patient Name: Emily Roberto  MRN: [id number]  Today's Date: 1/12/2017    Admit Date: 1/11/2017 01/12/17 1151    Final Note    Final Note SS received an call from MARK Null stating the pt has informed her, she received a letter in the mail from [facility] stating the insurance was not going to pay for the oxygen due to lack of information. SS spoke with the pt's nurse, Virginia and she states the pt was 79% on room air.  SS contacted [facility] and spoke with Rosa regarding the pt receiving an letter pertaining to her oxygen.  [place] states they do not have the appropriate paperwork in order for Medicare to pay for the oxygen.  SS reviewed  [facility] orders and faxed order from 12/08/16 with qualifying room air saturation and discharge summary. SS also faxed today's room air saturation of 79% to [facility] [facility] at [phone].  Pt has portable oxygen to go home on today.  SS provided contact information, if pt has further issues with her oxygen being qualified and paid by her insurance.       01/12/17 1123   Anuradha Espinoza

## 2017-01-12 NOTE — NURSING NOTE
Patient walked around the nurses stations without oxygen upon getting back to room oxygen was 79%, when put back on 2L NC took 5 minutes for O2 sat to reach 90%.

## 2017-01-12 NOTE — PLAN OF CARE
Problem: Patient Care Overview (Adult)  Goal: Discharge Needs Assessment  Outcome: Outcome(s) achieved Date Met:  01/12/17 01/12/17 1125   Discharge Needs Assessment   Concerns To Be Addressed other (see comments)  (Pt will be discharged home today with family, and oxygen)   Readmission Within The Last 30 Days no previous admission in last 30 days   Equipment Needed After Discharge oxygen   Discharge Facility/Level Of Care Needs (home with family)   Discharge Disposition home or self-care   Current Health   Anticipated Changes Related to Illness inability to work   Self-Care   Equipment Currently Used at Home none   Living Environment   Transportation Available car;family or friend will provide         Problem: Cardiac Catheterization with/without PCI (Adult)  Goal: Signs and Symptoms of Listed Potential Problems Will be Absent or Manageable (Cardiac Catheterization with/without PCI)  Outcome: Outcome(s) achieved Date Met:  01/12/17 01/12/17 1125   Cardiac Catheterization with/without PCI   Problems Assessed (Cardiac Catheterization) all   Problems Present (Cardiac Catheterization) none

## 2017-01-12 NOTE — DISCHARGE SUMMARY
Chief complaint:  Chest pain    History of present illness:  Emily is a very pleasant 71-year-old woman who presents with a history of known coronary artery disease.  She had initially been relegated to bypass surgery however was felt not to be an ideal surgical candidate.  She is undergone percutaneous revascularization of her LAD at [place] and presented yesterday for staged intervention to her right coronary artery.    Hospital course:  The patient underwent pertinent initial revascularization of her right coronary artery which was uncomplicated.  She is now being discharged to home.    Discharge medications:  Aspirin 325 mg daily  Lipitor 20 mg daily  Plavix 75 mg daily  Neurontin 4 mg daily  Hydrochlorothiazide 25 mg daily  Imdur 24 mg daily  Lisinopril 20 mg daily  Lopressor 25 mg twice a day  Vitamin D    Procedures performed  Gissell [place]fe stenting of the right coronary artery    Discharge instructions:  The patient has been instructed regarding the importance of dual antiplatelet therapy  The patient will follow-up with me in approximately 2-4 weeks  The patient will return to the emergency department.  Recurrent chest pain.

## 2017-02-21 ENCOUNTER — OFFICE VISIT (OUTPATIENT)
Dept: CARDIOLOGY | Facility: CLINIC | Age: 72
End: 2017-02-21

## 2017-02-21 VITALS
SYSTOLIC BLOOD PRESSURE: 130 MMHG | BODY MASS INDEX: 37.28 KG/M2 | DIASTOLIC BLOOD PRESSURE: 65 MMHG | HEIGHT: 62 IN | OXYGEN SATURATION: 94 % | HEART RATE: 62 BPM | WEIGHT: 202.6 LBS

## 2017-02-21 DIAGNOSIS — I25.118 CORONARY ARTERY DISEASE OF NATIVE ARTERY OF NATIVE HEART WITH STABLE ANGINA PECTORIS (HCC): ICD-10-CM

## 2017-02-21 DIAGNOSIS — J43.1 PANLOBULAR EMPHYSEMA (HCC): ICD-10-CM

## 2017-02-21 DIAGNOSIS — R09.02 HYPOXIA: ICD-10-CM

## 2017-02-21 DIAGNOSIS — E66.9 OBESITY, CLASS II, BMI 35-39.9: ICD-10-CM

## 2017-02-21 DIAGNOSIS — R09.02 HYPOXEMIA: Primary | ICD-10-CM

## 2017-02-21 DIAGNOSIS — I25.118 CORONARY ARTERY DISEASE INVOLVING NATIVE CORONARY ARTERY OF NATIVE HEART WITH OTHER FORM OF ANGINA PECTORIS (HCC): ICD-10-CM

## 2017-02-21 DIAGNOSIS — J96.21 ACUTE ON CHRONIC RESPIRATORY FAILURE WITH HYPOXIA AND HYPERCAPNIA (HCC): ICD-10-CM

## 2017-02-21 DIAGNOSIS — J96.22 ACUTE ON CHRONIC RESPIRATORY FAILURE WITH HYPOXIA AND HYPERCAPNIA (HCC): ICD-10-CM

## 2017-02-21 DIAGNOSIS — I10 ESSENTIAL HYPERTENSION: ICD-10-CM

## 2017-02-21 DIAGNOSIS — E78.2 MIXED HYPERLIPIDEMIA: ICD-10-CM

## 2017-02-21 PROCEDURE — 99214 OFFICE O/P EST MOD 30 MIN: CPT | Performed by: INTERNAL MEDICINE

## 2017-02-21 NOTE — PROGRESS NOTES
Subjective   Emily Roberto is a 71 y.o. female.     Chief Complaint   Patient presents with   • Follow-up   • STENT PLACEMENT       History of Present Illness     Emily is a pleasant 71-year-old woman who presents for follow-up of known coronary artery disease.  She initially presented with unstable angina and underwent cardiac catheterization which revealed two-vessel disease involving the LAD and right coronary arteries.  She was referred for bypass surgery however, was relegated to interventional therapy.  She underwent drug eluting stent implantation to the LAD and diagonal branch in December and had a staged intervention to her right coronary artery in January.  She currently notes that she is no longer having any angina or anginal-like symptoms.  She does note occasional right-sided fleeting chest discomfort that is not related to physical activity.  She denies any clear precipitating or relieving factors.  Again, she notes that her previous left-sided chest pressure that radiated into her left arm and was worse with exertion has completely resolved.  She denies any heart failure symptoms, palpitations, near syncope or syncopal symptoms.  She has been compliant with medical therapy.  When she was in [place] she did undergo pulmonary function tests which were notable for a restrictive defect.  She discontinued tobacco products in December.  She notes that with any ambulation her oxygen saturation drops into the low 90s and high 80s.    The following portions of the patient's history were reviewed and updated as appropriate: allergies, current medications, past family history, past medical history, past social history, past surgical history and problem list.    Review of Systems   Constitutional: Positive for fatigue. Negative for activity change and appetite change.   HENT: Negative for congestion and tinnitus.    Eyes: Negative for visual disturbance.   Respiratory: Negative for cough, chest tightness  and shortness of breath.    Cardiovascular: Positive for chest pain. Negative for palpitations and leg swelling.   Gastrointestinal: Positive for nausea. Negative for blood in stool and vomiting.   Endocrine: Positive for heat intolerance. Negative for cold intolerance and polyuria.   Genitourinary: Negative for dysuria.   Musculoskeletal: Negative for myalgias and neck pain.   Skin: Negative for rash.   Neurological: Positive for weakness. Negative for dizziness, syncope and light-headedness.   Hematological: Does not bruise/bleed easily.   Psychiatric/Behavioral: Positive for sleep disturbance. Negative for confusion.       Objective   Physical Exam   Constitutional: She is oriented to person, place, and time. She appears well-developed and well-nourished. No distress.   HENT:   Head: Normocephalic and atraumatic.   Nose: Nose normal.   Mouth/Throat: Oropharynx is clear and moist.   Eyes: Conjunctivae and EOM are normal. Right eye exhibits no discharge. Left eye exhibits no discharge. No scleral icterus.   Neck: Normal range of motion. No hepatojugular reflux and no JVD present. Carotid bruit is not present. No tracheal deviation present.   Cardiovascular: Normal rate, regular rhythm, S1 normal, S2 normal and intact distal pulses.  PMI is not displaced.  Exam reveals no gallop, no S3, no S4 and no friction rub.    No murmur heard.  Pulmonary/Chest: No accessory muscle usage. No respiratory distress. She has no wheezes. She has no rales. She exhibits no tenderness.   She has decreased air movement bilaterally.  There is a prolonged expiratory phase.  There are no crackles or wheezes noted.  She is not using accessory muscles for breathing.   Abdominal: Soft. Bowel sounds are normal. She exhibits no distension. There is no tenderness.   Musculoskeletal: Normal range of motion. She exhibits no edema or tenderness.       Vascular Status -  Her exam exhibits no right foot edema. Her exam exhibits no left foot  edema.  Neurological: She is alert and oriented to person, place, and time. No cranial nerve deficit. Coordination normal.   Skin: Skin is warm and dry. She is not diaphoretic.   Nursing note and vitals reviewed.      Procedures    Assessment/Plan     Coronary Artery Disease.  I suspect that overall the patient's coronary artery disease is stable.  At this time I have not ordered any further cardiac testing.  I will work on risk factor modification as noted below.    Hypoxemia  In regard to her history of pulmonary disease, namely a restrictive deficit along with hypoxemia I have asked her to follow-up with Dr. Blackwell for a pulmonary consultation    Hypertension.  The home blood pressure diary suggests reasonable control of the patient's HTN.  At this time I will continue current medications as is.  I have asked them to maintain a blood pressure diary    Hyperlipidemia In regard to their history of hyperlipidemia, the most recent cholesterol panel demonstrated excellent control.  I will continue the current medications as is.    In short, it is been a pleasure to participate in Emily's care, I look forward to seeing her back in 3 months.

## 2017-02-28 ENCOUNTER — OFFICE VISIT (OUTPATIENT)
Dept: PULMONOLOGY | Facility: CLINIC | Age: 72
End: 2017-02-28

## 2017-02-28 VITALS
WEIGHT: 201 LBS | BODY MASS INDEX: 36.99 KG/M2 | DIASTOLIC BLOOD PRESSURE: 62 MMHG | HEIGHT: 62 IN | OXYGEN SATURATION: 93 % | HEART RATE: 69 BPM | SYSTOLIC BLOOD PRESSURE: 115 MMHG | TEMPERATURE: 98 F

## 2017-02-28 DIAGNOSIS — J84.9 INTERSTITIAL LUNG DISEASE (HCC): ICD-10-CM

## 2017-02-28 DIAGNOSIS — J41.0 SIMPLE CHRONIC BRONCHITIS (HCC): ICD-10-CM

## 2017-02-28 DIAGNOSIS — G47.33 OSA (OBSTRUCTIVE SLEEP APNEA): Primary | ICD-10-CM

## 2017-02-28 PROCEDURE — 99204 OFFICE O/P NEW MOD 45 MIN: CPT | Performed by: INTERNAL MEDICINE

## 2017-02-28 NOTE — PROGRESS NOTES
Subjective   Emily Roberto is a 71 y.o. female who is being seen for hypoxia    History of Present Illness   This is a 71-year-old female who presents to us for evaluation of ongoing shortness of breath and hypoxemia.  Patient tells me that this started around December when she became acutely short of breath and went to the emergency room.  She was found to have acute coronary syndrome, a cardiac catheter revealed multiple blockages and eventually she had multiple stent placed.  She was hypoxemic at that point and was started on 4 L of oxygen delivered through nasal cannula on a continuous basis.  Subsequently after stent placement her oxygen requirement went down and she was placed on 2 L of oxygen round-the-clock.  Now she feels much better but exertional dyspnea is still persisting she denies any overt coughing spells or wheezing.  On questioning she admits having loud snoring, fragmented sleep and increased daytime sleepiness.    Past Medical History   Diagnosis Date   • CAD (coronary artery disease), native coronary artery 12/3/2016   • Centrilobular emphysema 12/8/2016   • Diabetes    • Hypercholesteremia    • Hypertension    • Osteoporosis      Past Surgical History   Procedure Laterality Date   • Cataract extraction     • Cholecystectomy     • Cardiac catheterization N/A 12/1/2016     Procedure: Left Heart Cath;  Surgeon: Nate Butler MD;  Location:  [facility];  Service:    • Cardiac catheterization N/A 12/5/2016     Procedure: Left Heart Cath;  Surgeon: Alyssa Montalvo MD;  Location:  [facility];  Service:    • Cardiac catheterization N/A 1/11/2017     Procedure: Left Heart Cath/STENT RCA;  Surgeon: Nate Butler MD;  Location:  [facility];  Service:      Family History   Problem Relation Age of Onset   • Hypertension Mother    • Heart disease Mother    • Diabetes Mother    • Hypertension Father    • Heart disease Father    • Diabetes Father   "  • Cancer Sister    • Heart disease Sister    • Hypertension Sister       reports that she quit smoking about 2 months ago. Her smoking use included Cigarettes. She has a 10.00 pack-year smoking history. She has never used smokeless tobacco. She reports that she does not drink alcohol or use illicit drugs.  No Known Allergies        The following portions of the patient's history were reviewed and updated as appropriate: allergies, current medications, past family history, past medical history, past social history, past surgical history and problem list.    Review of Systems   Constitutional: Negative for appetite change, chills, diaphoresis and unexpected weight change.   HENT: Negative for sore throat, trouble swallowing and voice change.    Eyes: Negative for visual disturbance.   Respiratory: Positive for shortness of breath. Negative for apnea, cough, choking and wheezing.    Cardiovascular: Negative for chest pain, palpitations and leg swelling.   Gastrointestinal: Negative for abdominal pain, constipation, diarrhea, nausea and vomiting.   Endocrine: Negative for cold intolerance, heat intolerance, polydipsia, polyphagia and polyuria.   Genitourinary: Negative for difficulty urinating and dysuria.   Musculoskeletal: Negative for gait problem.   Skin: Negative for rash and wound.   Neurological: Negative for syncope and light-headedness.   Hematological: Negative for adenopathy.   Psychiatric/Behavioral: Negative for agitation, behavioral problems and confusion.   All other systems reviewed and are negative.      Objective   Visit Vitals   • /62 (BP Location: Left arm, Patient Position: Sitting, Cuff Size: Adult)   • Pulse 69   • Temp 98 °F (36.7 °C) (Oral)   • Ht 62\" (157.5 cm)   • Wt 201 lb (91.2 kg)   • SpO2 93%   • BMI 36.76 kg/m2     Physical Exam   Constitutional: She is oriented to person, place, and time. She appears well-developed and well-nourished.   HENT:   Head: Normocephalic and atraumatic. "    Crowded oropharynx.  Mallampati score 3   Eyes: EOM are normal. Pupils are equal, round, and reactive to light.   Neck: Normal range of motion. Neck supple.   Thick neck   Cardiovascular: Normal rate and regular rhythm.    Pulmonary/Chest: Effort normal. She has rales.   Bibasilar rales   Abdominal: Soft. Bowel sounds are normal.   Protuberant belly, abdominal obesity   Musculoskeletal: She exhibits edema.   Neurological: She is alert and oriented to person, place, and time.   Skin: Skin is warm and dry.   Psychiatric: She has a normal mood and affect. Her behavior is normal.   Nursing note and vitals reviewed.        Radiology:    EXAMINATION: XR CHEST, SINGLE VIEW - 12/02/2016      INDICATION: Pre-op.       COMPARISON: None.      FINDINGS: Portable chest reveals the heart to be enlarged. Increased  pulmonary vascularity bilaterally. Degenerative change is seen within  the spine. No focal parenchymal opacification is present. No pleural  effusion or pneumothorax. The bony structures are unremarkable.       IMPRESSION:  The heart is enlarged. Increased pulmonary vascularity.  Degenerative change is seen within the spine. No focal parenchymal  opacification present.      DICTATED: 12/03/2106  EDITED: 12/03/2016      This report was finalized on 12/5/2016 8:38 AM by Dr. María Scott MD.  Lab Results:  CBC  Lab Results   Component Value Date    WBC 6.81 01/12/2017    RBC 3.39 (L) 01/12/2017    HGB 10.4 (L) 01/12/2017    HCT 32.3 (L) 01/12/2017    MCV 95.3 (H) 01/12/2017    MCH 30.7 01/12/2017    MCHC 32.2 (L) 01/12/2017    RDW 12.8 01/12/2017     01/12/2017    NEUTRORELPCT 67.1 12/10/2014    LYMPHORELPCT 25.8 12/10/2014    MONORELPCT 5.6 12/10/2014    EOSRELPCT 1.2 12/10/2014    BASORELPCT 0.2 12/10/2014    NEUTROABS 5.4 12/10/2014    LYMPHSABS 2.1 12/10/2014    MONOSABS 0.5 12/10/2014    EOSABS 0.1 12/10/2014    BASOSABS 0.0 12/10/2014       CMP  Lab Results   Component Value Date     01/12/2017     K 3.7 01/12/2017     01/12/2017    CO2 28.5 01/12/2017    GLUCOSE 141 (H) 01/12/2017    BUN 13 01/12/2017    CREATININE 0.94 01/12/2017    CALCIUM 9.2 01/12/2017    AST 29 01/12/2017    ALKPHOS 51 01/12/2017    BILITOT 0.4 01/12/2017    ALT 16 01/12/2017    LABIL2 1.4 (L) 01/12/2017    LABOSMO 284 12/10/2014    BCR 13.8 01/12/2017    ANIONGAP 5.5 01/12/2017    ALBUMIN 3.70 01/12/2017    PROTEINTOT 6.3 01/12/2017        Assessment      ICD-10-CM ICD-9-CM   1. JOSSUE (obstructive sleep apnea) G47.33 327.23   2. Interstitial lung disease J84.9 515   3. Simple chronic bronchitis J41.0 491.0                DISCUSSION:  This patient has significant exertional dyspnea which at least initially was presumed to be from cardiac origin.  After putting cardiac stents in her symptoms are better but she still continues to have significant exertional dyspnea.  I'm wondering whether we are missing a pulmonary component namely interstitial lung disease or underlying altered airways disease.  Patient's symptomatology is also consistent with obstructive sleep apnea.    We are sending her for a pulmonary function test, a nocturnal polysomnography and a CT scan of the chest with high resolution and thin cut.  I like to reevaluate her again after that and further management plan will depend on the findings.    Plan    Orders Placed This Encounter   Procedures   • CT Chest Without Contrast   • Ambulatory Referral to Sleep Medicine   • Pulmonary Function Test                    Diego Potter MD, FCCP, FAASM  Pulmonary, Critical Care, and Sleep Medicine

## 2017-05-23 ENCOUNTER — APPOINTMENT (OUTPATIENT)
Dept: GENERAL RADIOLOGY | Facility: HOSPITAL | Age: 72
End: 2017-05-23

## 2017-05-23 ENCOUNTER — HOSPITAL ENCOUNTER (EMERGENCY)
Facility: HOSPITAL | Age: 72
Discharge: HOME | End: 2017-05-23
Attending: EMERGENCY MEDICINE | Admitting: EMERGENCY MEDICINE

## 2017-05-23 VITALS
OXYGEN SATURATION: 97 % | HEART RATE: 87 BPM | WEIGHT: 200 LBS | HEIGHT: 63 IN | BODY MASS INDEX: 35.44 KG/M2 | SYSTOLIC BLOOD PRESSURE: 144 MMHG | DIASTOLIC BLOOD PRESSURE: 82 MMHG | RESPIRATION RATE: 17 BRPM | TEMPERATURE: 98 F

## 2017-05-23 DIAGNOSIS — J44.1 COPD EXACERBATION (HCC): Primary | ICD-10-CM

## 2017-05-23 LAB
A-A DO2: 67.4 MMHG (ref 0–300)
ALBUMIN SERPL-MCNC: 4.3 G/DL (ref 3.4–4.8)
ALBUMIN/GLOB SERPL: 1.2 G/DL (ref 1.5–2.5)
ALP SERPL-CCNC: 63 U/L (ref 35–104)
ALT SERPL W P-5'-P-CCNC: 12 U/L (ref 10–36)
ANION GAP SERPL CALCULATED.3IONS-SCNC: 7.9 MMOL/L (ref 3.6–11.2)
ARTERIAL PATENCY WRIST A: ABNORMAL
AST SERPL-CCNC: 25 U/L (ref 10–30)
ATMOSPHERIC PRESS: 724 MMHG
BASE EXCESS BLDA CALC-SCNC: -1.4 MMOL/L
BASOPHILS # BLD AUTO: 0.02 10*3/MM3 (ref 0–0.3)
BASOPHILS NFR BLD AUTO: 0.2 % (ref 0–2)
BDY SITE: ABNORMAL
BILIRUB SERPL-MCNC: 0.7 MG/DL (ref 0.2–1.8)
BNP SERPL-MCNC: 174 PG/ML (ref 0–100)
BODY TEMPERATURE: 98.6 C
BUN BLD-MCNC: 12 MG/DL (ref 7–21)
BUN/CREAT SERPL: 14 (ref 7–25)
CALCIUM SPEC-SCNC: 10 MG/DL (ref 7.7–10)
CHLORIDE SERPL-SCNC: 107 MMOL/L (ref 99–112)
CO2 SERPL-SCNC: 28.1 MMOL/L (ref 24.3–31.9)
COHGB MFR BLD: 1.8 % (ref 0–5)
CREAT BLD-MCNC: 0.86 MG/DL (ref 0.43–1.29)
DEPRECATED RDW RBC AUTO: 41.2 FL (ref 37–54)
EOSINOPHIL # BLD AUTO: 0.08 10*3/MM3 (ref 0–0.7)
EOSINOPHIL NFR BLD AUTO: 0.9 % (ref 0–7)
ERYTHROCYTE [DISTWIDTH] IN BLOOD BY AUTOMATED COUNT: 13.2 % (ref 11.5–14.5)
GFR SERPL CREATININE-BSD FRML MDRD: 65 ML/MIN/1.73
GLOBULIN UR ELPH-MCNC: 3.5 GM/DL
GLUCOSE BLD-MCNC: 145 MG/DL (ref 70–110)
HCO3 BLDA-SCNC: 22.8 MMOL/L (ref 22–26)
HCT VFR BLD AUTO: 35.3 % (ref 37–47)
HCT VFR BLD CALC: 36 % (ref 37–47)
HGB BLD-MCNC: 11.7 G/DL (ref 12–16)
HGB BLDA-MCNC: 12.4 G/DL (ref 12–16)
HOLD SPECIMEN: NORMAL
HOLD SPECIMEN: NORMAL
HOROWITZ INDEX BLD+IHG-RTO: 28 %
IMM GRANULOCYTES # BLD: 0.04 10*3/MM3 (ref 0–0.03)
IMM GRANULOCYTES NFR BLD: 0.4 % (ref 0–0.5)
LYMPHOCYTES # BLD AUTO: 1.64 10*3/MM3 (ref 1–3)
LYMPHOCYTES NFR BLD AUTO: 17.4 % (ref 16–46)
MCH RBC QN AUTO: 29.4 PG (ref 27–33)
MCHC RBC AUTO-ENTMCNC: 33.1 G/DL (ref 33–37)
MCV RBC AUTO: 88.7 FL (ref 80–94)
METHGB BLD QL: 0.4 % (ref 0–3)
MODALITY: ABNORMAL
MONOCYTES # BLD AUTO: 0.53 10*3/MM3 (ref 0.1–0.9)
MONOCYTES NFR BLD AUTO: 5.6 % (ref 0–12)
NEUTROPHILS # BLD AUTO: 7.1 10*3/MM3 (ref 1.4–6.5)
NEUTROPHILS NFR BLD AUTO: 75.5 % (ref 40–75)
OSMOLALITY SERPL CALC.SUM OF ELEC: 287.3 MOSM/KG (ref 273–305)
OXYHGB MFR BLDV: 92.9 % (ref 85–100)
PCO2 BLDA: 36.7 MM HG (ref 35–45)
PH BLDA: 7.41 PH UNITS (ref 7.35–7.45)
PLATELET # BLD AUTO: 302 10*3/MM3 (ref 130–400)
PMV BLD AUTO: 10.1 FL (ref 6–10)
PO2 BLDA: 78.9 MM HG (ref 80–100)
POTASSIUM BLD-SCNC: 2.9 MMOL/L (ref 3.5–5.3)
PROT SERPL-MCNC: 7.8 G/DL (ref 6–8)
RBC # BLD AUTO: 3.98 10*6/MM3 (ref 4.2–5.4)
SAO2 % BLDCOA: 95 % (ref 90–100)
SODIUM BLD-SCNC: 143 MMOL/L (ref 135–153)
TROPONIN I SERPL-MCNC: 0.02 NG/ML
WBC NRBC COR # BLD: 9.41 10*3/MM3 (ref 4.5–12.5)
WHOLE BLOOD HOLD SPECIMEN: NORMAL
WHOLE BLOOD HOLD SPECIMEN: NORMAL

## 2017-05-23 PROCEDURE — 82375 ASSAY CARBOXYHB QUANT: CPT | Performed by: PHYSICIAN ASSISTANT

## 2017-05-23 PROCEDURE — 83880 ASSAY OF NATRIURETIC PEPTIDE: CPT | Performed by: EMERGENCY MEDICINE

## 2017-05-23 PROCEDURE — 83050 HGB METHEMOGLOBIN QUAN: CPT | Performed by: PHYSICIAN ASSISTANT

## 2017-05-23 PROCEDURE — 80053 COMPREHEN METABOLIC PANEL: CPT | Performed by: EMERGENCY MEDICINE

## 2017-05-23 PROCEDURE — 94640 AIRWAY INHALATION TREATMENT: CPT

## 2017-05-23 PROCEDURE — 84484 ASSAY OF TROPONIN QUANT: CPT | Performed by: EMERGENCY MEDICINE

## 2017-05-23 PROCEDURE — 82805 BLOOD GASES W/O2 SATURATION: CPT | Performed by: PHYSICIAN ASSISTANT

## 2017-05-23 PROCEDURE — 36600 WITHDRAWAL OF ARTERIAL BLOOD: CPT | Performed by: PHYSICIAN ASSISTANT

## 2017-05-23 PROCEDURE — 93005 ELECTROCARDIOGRAM TRACING: CPT

## 2017-05-23 PROCEDURE — 71020 XR CHEST 2 VW: CPT | Performed by: RADIOLOGY

## 2017-05-23 PROCEDURE — 99284 EMERGENCY DEPT VISIT MOD MDM: CPT

## 2017-05-23 PROCEDURE — 85025 COMPLETE CBC W/AUTO DIFF WBC: CPT | Performed by: EMERGENCY MEDICINE

## 2017-05-23 PROCEDURE — 71020 HC CHEST PA AND LATERAL: CPT

## 2017-05-23 RX ORDER — BUDESONIDE AND FORMOTEROL FUMARATE DIHYDRATE 160; 4.5 UG/1; UG/1
2 AEROSOL RESPIRATORY (INHALATION)
Qty: 1 INHALER | Refills: 0 | Status: SHIPPED | OUTPATIENT
Start: 2017-05-23 | End: 2018-03-19 | Stop reason: ALTCHOICE

## 2017-05-23 RX ORDER — METHYLPREDNISOLONE 4 MG/1
TABLET ORAL
Qty: 21 TABLET | Refills: 0 | Status: SHIPPED | OUTPATIENT
Start: 2017-05-23 | End: 2017-06-09

## 2017-05-23 RX ORDER — SODIUM CHLORIDE 0.9 % (FLUSH) 0.9 %
10 SYRINGE (ML) INJECTION AS NEEDED
Status: DISCONTINUED | OUTPATIENT
Start: 2017-05-23 | End: 2017-05-23 | Stop reason: HOSPADM

## 2017-05-23 RX ORDER — AMOXICILLIN AND CLAVULANATE POTASSIUM 875; 125 MG/1; MG/1
1 TABLET, FILM COATED ORAL 2 TIMES DAILY
Qty: 20 TABLET | Refills: 0 | Status: SHIPPED | OUTPATIENT
Start: 2017-05-23 | End: 2017-06-09

## 2017-05-23 RX ORDER — PROMETHAZINE HYDROCHLORIDE AND CODEINE PHOSPHATE 6.25; 1 MG/5ML; MG/5ML
5 SYRUP ORAL ONCE
Status: COMPLETED | OUTPATIENT
Start: 2017-05-23 | End: 2017-05-23

## 2017-05-23 RX ORDER — IPRATROPIUM BROMIDE AND ALBUTEROL SULFATE 2.5; .5 MG/3ML; MG/3ML
3 SOLUTION RESPIRATORY (INHALATION) ONCE
Status: COMPLETED | OUTPATIENT
Start: 2017-05-23 | End: 2017-05-23

## 2017-05-23 RX ORDER — PROMETHAZINE HYDROCHLORIDE AND CODEINE PHOSPHATE 6.25; 1 MG/5ML; MG/5ML
5 SYRUP ORAL EVERY 4 HOURS PRN
Qty: 118 ML | Refills: 0 | Status: SHIPPED | OUTPATIENT
Start: 2017-05-23 | End: 2017-06-09

## 2017-05-23 RX ORDER — POTASSIUM CHLORIDE 20 MEQ/1
40 TABLET, EXTENDED RELEASE ORAL ONCE
Status: COMPLETED | OUTPATIENT
Start: 2017-05-23 | End: 2017-05-23

## 2017-05-23 RX ADMIN — IPRATROPIUM BROMIDE AND ALBUTEROL SULFATE 3 ML: .5; 3 SOLUTION RESPIRATORY (INHALATION) at 16:36

## 2017-05-23 RX ADMIN — PROMETHAZINE HYDROCHLORIDE AND CODEINE PHOSPHATE 5 ML: 6.25; 1 SOLUTION ORAL at 18:29

## 2017-05-23 RX ADMIN — POTASSIUM CHLORIDE 40 MEQ: 1500 TABLET, EXTENDED RELEASE ORAL at 17:10

## 2017-06-09 ENCOUNTER — OFFICE VISIT (OUTPATIENT)
Dept: PULMONOLOGY | Facility: CLINIC | Age: 72
End: 2017-06-09

## 2017-06-09 VITALS
TEMPERATURE: 98 F | HEIGHT: 62 IN | OXYGEN SATURATION: 90 % | DIASTOLIC BLOOD PRESSURE: 76 MMHG | SYSTOLIC BLOOD PRESSURE: 134 MMHG | HEART RATE: 67 BPM | BODY MASS INDEX: 37.36 KG/M2 | WEIGHT: 203 LBS

## 2017-06-09 DIAGNOSIS — J41.0 SIMPLE CHRONIC BRONCHITIS (HCC): ICD-10-CM

## 2017-06-09 DIAGNOSIS — G47.33 OSA (OBSTRUCTIVE SLEEP APNEA): Primary | ICD-10-CM

## 2017-06-09 DIAGNOSIS — E66.9 OBESITY (BMI 30-39.9): ICD-10-CM

## 2017-06-09 PROCEDURE — 99214 OFFICE O/P EST MOD 30 MIN: CPT | Performed by: INTERNAL MEDICINE

## 2017-06-09 RX ORDER — ALBUTEROL SULFATE 90 UG/1
2 AEROSOL, METERED RESPIRATORY (INHALATION) EVERY 4 HOURS PRN
Qty: 1 INHALER | Refills: 6 | Status: SHIPPED | OUTPATIENT
Start: 2017-06-09

## 2017-06-09 RX ORDER — BUDESONIDE AND FORMOTEROL FUMARATE DIHYDRATE 160; 4.5 UG/1; UG/1
2 AEROSOL RESPIRATORY (INHALATION)
Qty: 1 INHALER | Refills: 6 | Status: SHIPPED | OUTPATIENT
Start: 2017-06-09 | End: 2018-03-19 | Stop reason: ALTCHOICE

## 2017-06-09 NOTE — PROGRESS NOTES
Subjective   Emily Roberto is a 72 y.o. female who is being seen for Sleep Apnea    History of Present Illness   Patient returns after a nocturnal polysomnography.  Subjectively she still continues to have significant sleep disturbance with fragmented sleep and increased daytime fatigue and sleepiness.  She also complains about exertional dyspnea, some time dyspnea at rest.  Has some dry cough which sometimes leads to shortness of breath.  Past Medical History:   Diagnosis Date   • CAD (coronary artery disease), native coronary artery 12/3/2016   • Centrilobular emphysema 12/8/2016   • Diabetes    • Hypercholesteremia    • Hypertension    • Osteoporosis      Past Surgical History:   Procedure Laterality Date   • CARDIAC CATHETERIZATION N/A 12/1/2016    Procedure: Left Heart Cath;  Surgeon: Nate Butler MD;  Location:  [facility];  Service:    • CARDIAC CATHETERIZATION N/A 12/5/2016    Procedure: Left Heart Cath;  Surgeon: Alyssa Montalvo MD;  Location:  [facility];  Service:    • CARDIAC CATHETERIZATION N/A 1/11/2017    Procedure: Left Heart Cath/STENT RCA;  Surgeon: Nate Butler MD;  Location:  [facility];  Service:    • CATARACT EXTRACTION     • CHOLECYSTECTOMY       Family History   Problem Relation Age of Onset   • Hypertension Mother    • Heart disease Mother    • Diabetes Mother    • Hypertension Father    • Heart disease Father    • Diabetes Father    • Cancer Sister    • Heart disease Sister    • Hypertension Sister       reports that she quit smoking about 6 months ago. Her smoking use included Cigarettes. She has a 10.00 pack-year smoking history. She has never used smokeless tobacco. She reports that she does not drink alcohol or use illicit drugs.  No Known Allergies        The following portions of the patient's history were reviewed and updated as appropriate: allergies, current medications, past family history, past medical history, past  "social history, past surgical history and problem list.    Review of Systems   Constitutional: Negative for appetite change, chills, diaphoresis and unexpected weight change.   HENT: Negative for sore throat, trouble swallowing and voice change.    Eyes: Negative for visual disturbance.   Respiratory: Positive for cough, shortness of breath and wheezing. Negative for apnea and choking.    Cardiovascular: Negative for chest pain, palpitations and leg swelling.   Gastrointestinal: Negative for abdominal pain, constipation, diarrhea, nausea and vomiting.   Endocrine: Negative for cold intolerance, heat intolerance, polydipsia, polyphagia and polyuria.   Genitourinary: Negative for difficulty urinating and dysuria.   Musculoskeletal: Negative for gait problem.   Skin: Negative for rash and wound.   Neurological: Negative for syncope and light-headedness.   Hematological: Negative for adenopathy.   Psychiatric/Behavioral: Negative for agitation, behavioral problems and confusion.   All other systems reviewed and are negative.      Objective   /76 (BP Location: Left arm, Patient Position: Sitting, Cuff Size: Adult)  Pulse 67  Temp 98 °F (36.7 °C) (Oral)   Ht 62\" (157.5 cm)  Wt 203 lb (92.1 kg)  SpO2 90%  BMI 37.13 kg/m2  Physical Exam   Constitutional: She is oriented to person, place, and time.   HENT:   Head: Normocephalic and atraumatic.   Nose: Mucosal edema present.   Eyes: EOM are normal. Pupils are equal, round, and reactive to light.   Neck: Neck supple.   Cardiovascular: Normal rate, regular rhythm and normal heart sounds.    Pulmonary/Chest: She has rhonchi.   Vesicular breath sound bilaterally with prolonged expiratory phase   Abdominal: Soft. Bowel sounds are normal.   Musculoskeletal: Normal range of motion. She exhibits no deformity.   Neurological: She is alert and oriented to person, place, and time.   Skin: Skin is warm and dry.   Psychiatric: She has a normal mood and affect. Her behavior is " normal.   Nursing note and vitals reviewed.        Radiology:    Study Result   EXAMINATION: CT ANGIOGRAM CHEST W WO CONTRAST- 12/06/2016:      INDICATION: hypoxia , shortness of breath, COPD, coronary disease.      TECHNIQUE: CTA datasets of the chest and mediastinum were performed with  90 mL of Isovue 370 contrast intravenously.      The radiation dose reduction device was turned on for each scan per the  ALARA (As Low as Reasonably Achievable) protocol.      COMPARISON: NONE      FINDINGS:   1. Thyromegaly is seen diffusely without focal thyroid mass.  Four-chamber cardiomegaly is noted without pericardial effusion.  Scattered normal lymph nodes are seen throughout the mediastinum without  evidence of bulky adenopathy. There is mild distention of the azygos  vein.  2. Mild consolidative airspace opacities with air bronchograms are seen  at the lung bases. Trace effusions are seen at the lung bases  posteriorly.  3. Extended window datasets demonstrate groundglass opacities in the mid  and lower lung zones. There is bronchiectasis at both lung bases. There  is pleural scarring and pleural thickening posteriorly in the chest.  Centrilobular emphysema and obstructive lung disease is noted.  4. Lastly, pulmonary embolus is not identified. Also, although there is  mild cylindrical ectasia of the ascending aorta, thoracic aortic  dissection or high-grade aneurysm is not identified.      IMPRESSION:  1. No evidence of acute pulmonary embolus or acute vascular abnormality.  2. Four-chamber cardiomegaly. Mild cylindrical ectasia ascending aorta  without dissection.  3. Small airspace consolidative opacities both lung bases with pleural  reaction and trace pleural effusion. Bronchiectasis is noted with  centrilobular emphysema and obstructive lung disease.  4. Thyromegaly.  5. Mild homogeneous steatosis of the liver without focal liver mass.  Adrenal glands and upper abdominal contents are unremarkable.          D:  12/06/2016  E: 12/06/2016      This report was finalized on 12/6/2016 2:15 PM by Dr. Cade Garcia MD.           Xr Chest 2 View    Result Date: 5/24/2017  Enlarged cardiac silhouette with changes of COPD in the lungs, stable in comparing with a film from 2016.  This report was finalized on 5/24/2017 9:22 AM by Dr. Christ Machado II, MD.                    Lab Results:      Admission on 05/23/2017, Discharged on 05/23/2017   Component Date Value Ref Range Status   • Glucose 05/23/2017 145* 70 - 110 mg/dL Final   • BUN 05/23/2017 12  7 - 21 mg/dL Final   • Creatinine 05/23/2017 0.86  0.43 - 1.29 mg/dL Final   • Sodium 05/23/2017 143  135 - 153 mmol/L Final   • Potassium 05/23/2017 2.9* 3.5 - 5.3 mmol/L Final   • Chloride 05/23/2017 107  99 - 112 mmol/L Final   • CO2 05/23/2017 28.1  24.3 - 31.9 mmol/L Final   • Calcium 05/23/2017 10.0  7.7 - 10.0 mg/dL Final   • Total Protein 05/23/2017 7.8  6.0 - 8.0 g/dL Final   • Albumin 05/23/2017 4.30  3.40 - 4.80 g/dL Final   • ALT (SGPT) 05/23/2017 12  10 - 36 U/L Final   • AST (SGOT) 05/23/2017 25  10 - 30 U/L Final   • Alkaline Phosphatase 05/23/2017 63  35 - 104 U/L Final   • Total Bilirubin 05/23/2017 0.7  0.2 - 1.8 mg/dL Final   • eGFR Non  Amer 05/23/2017 65  >60 mL/min/1.73 Final   • Globulin 05/23/2017 3.5  gm/dL Final   • A/G Ratio 05/23/2017 1.2* 1.5 - 2.5 g/dL Final   • BUN/Creatinine Ratio 05/23/2017 14.0  7.0 - 25.0 Final   • Anion Gap 05/23/2017 7.9  3.6 - 11.2 mmol/L Final   • BNP 05/23/2017 174.0* 0.0 - 100.0 pg/mL Final   • Troponin I 05/23/2017 0.022  <=0.040 ng/mL Final   • Extra Tube 05/23/2017 hold for add-on   Final   • Extra Tube 05/23/2017 Hold for add-ons.   Final   • Extra Tube 05/23/2017 hold for add-on   Final   • Extra Tube 05/23/2017 Hold for add-ons.   Final   • WBC 05/23/2017 9.41  4.50 - 12.50 10*3/mm3 Final   • RBC 05/23/2017 3.98* 4.20 - 5.40 10*6/mm3 Final   • Hemoglobin 05/23/2017 11.7* 12.0 - 16.0 g/dL Final   • Hematocrit  05/23/2017 35.3* 37.0 - 47.0 % Final   • MCV 05/23/2017 88.7  80.0 - 94.0 fL Final   • MCH 05/23/2017 29.4  27.0 - 33.0 pg Final   • MCHC 05/23/2017 33.1  33.0 - 37.0 g/dL Final   • RDW 05/23/2017 13.2  11.5 - 14.5 % Final   • RDW-SD 05/23/2017 41.2  37.0 - 54.0 fl Final   • MPV 05/23/2017 10.1* 6.0 - 10.0 fL Final   • Platelets 05/23/2017 302  130 - 400 10*3/mm3 Final   • Neutrophil % 05/23/2017 75.5* 40.0 - 75.0 % Final   • Lymphocyte % 05/23/2017 17.4  16.0 - 46.0 % Final   • Monocyte % 05/23/2017 5.6  0.0 - 12.0 % Final   • Eosinophil % 05/23/2017 0.9  0.0 - 7.0 % Final   • Basophil % 05/23/2017 0.2  0.0 - 2.0 % Final   • Immature Grans % 05/23/2017 0.4  0.0 - 0.5 % Final   • Neutrophils, Absolute 05/23/2017 7.10* 1.40 - 6.50 10*3/mm3 Final   • Lymphocytes, Absolute 05/23/2017 1.64  1.00 - 3.00 10*3/mm3 Final   • Monocytes, Absolute 05/23/2017 0.53  0.10 - 0.90 10*3/mm3 Final   • Eosinophils, Absolute 05/23/2017 0.08  0.00 - 0.70 10*3/mm3 Final   • Basophils, Absolute 05/23/2017 0.02  0.00 - 0.30 10*3/mm3 Final   • Immature Grans, Absolute 05/23/2017 0.04* 0.00 - 0.03 10*3/mm3 Final   • Site 05/23/2017 Arterial: left brachial   Final   • Alphonse's Test 05/23/2017 N/A   Final   • pH, Arterial 05/23/2017 7.412  7.350 - 7.450 pH units Final   • pCO2, Arterial 05/23/2017 36.7  35.0 - 45.0 mm Hg Final   • pO2, Arterial 05/23/2017 78.9* 80.0 - 100.0 mm Hg Final   • HCO3, Arterial 05/23/2017 22.8  22.0 - 26.0 mmol/L Final   • Base Excess, Arterial 05/23/2017 -1.4  mmol/L Final   • O2 Saturation, Arterial 05/23/2017 95.0  90.0 - 100.0 % Final   • Hemoglobin, Blood Gas 05/23/2017 12.4  12 - 16 g/dL Final   • Hematocrit, Blood Gas 05/23/2017 36.0* 37.0 - 47.0 % Final   • Oxyhemoglobin 05/23/2017 92.9  85 - 100 % Final   • Methemoglobin 05/23/2017 0.4  0 - 3 % Final   • Carboxyhemoglobin 05/23/2017 1.8  0 - 5 % Final   • A-a Gradiant 05/23/2017 67.4  0.0 - 300.0 mmHg Final   • Temperature 05/23/2017 98.6  C Final   •  Barometric Pressure for Blood Gas 05/23/2017 724  mmHg Final   • Modality 05/23/2017 Cannula - Nasal   Final   • FIO2 05/23/2017 28  % Final   • Osmolality Calc 05/23/2017 287.3  273.0 - 305.0 mOsm/kg Final       Assessment      ICD-10-CM ICD-9-CM   1. JOSSUE (obstructive sleep apnea) G47.33 327.23   2. Simple chronic bronchitis J41.0 491.0   3. Obesity (BMI 30-39.9) E66.9 278.00                DISCUSSION:  I have reviewed the nocturnal polysomnography report, patient has severe degree of obstructive sleep apnea with apnea-hypopnea index of 55 and lowest O2 sat of 58%.  We are starting her on AutoPap with pressure setting of 5-18 immediately.    Regarding her COPD she has been started on Symbicort and albuterol, we are giving her refills for that while waiting for the pulmonary function test.    Plan    Orders Placed This Encounter   Procedures   • PAP Therapy   • Pulmonary Function Test     New Medications Ordered This Visit   Medications   • budesonide-formoterol (SYMBICORT) 160-4.5 MCG/ACT inhaler     Sig: Inhale 2 puffs 2 (Two) Times a Day.     Dispense:  1 inhaler     Refill:  6   • albuterol (PROAIR HFA) 108 (90 BASE) MCG/ACT inhaler     Sig: Inhale 2 puffs Every 4 (Four) Hours As Needed for Wheezing.     Dispense:  1 inhaler     Refill:  6                  Diego Potter MD, FCCP, [facility]  Pulmonary, Critical Care, and Sleep Medicine

## 2017-07-18 ENCOUNTER — HOSPITAL ENCOUNTER (OUTPATIENT)
Dept: RESPIRATORY THERAPY | Facility: HOSPITAL | Age: 72
Discharge: HOME | End: 2017-07-18
Attending: INTERNAL MEDICINE | Admitting: INTERNAL MEDICINE

## 2017-07-18 DIAGNOSIS — E66.9 OBESITY (BMI 30-39.9): ICD-10-CM

## 2017-07-18 DIAGNOSIS — G47.33 OSA (OBSTRUCTIVE SLEEP APNEA): ICD-10-CM

## 2017-07-18 DIAGNOSIS — J41.0 SIMPLE CHRONIC BRONCHITIS (HCC): ICD-10-CM

## 2017-07-18 PROCEDURE — 94729 DIFFUSING CAPACITY: CPT

## 2017-07-18 PROCEDURE — A9270 NON-COVERED ITEM OR SERVICE: HCPCS

## 2017-07-18 PROCEDURE — 94727 GAS DIL/WSHOT DETER LNG VOL: CPT | Performed by: INTERNAL MEDICINE

## 2017-07-18 PROCEDURE — 94729 DIFFUSING CAPACITY: CPT | Performed by: INTERNAL MEDICINE

## 2017-07-18 PROCEDURE — 63710000001 ALBUTEROL PER 1 MG

## 2017-07-18 PROCEDURE — 94727 GAS DIL/WSHOT DETER LNG VOL: CPT

## 2017-07-18 PROCEDURE — 94060 EVALUATION OF WHEEZING: CPT

## 2017-07-18 PROCEDURE — 94060 EVALUATION OF WHEEZING: CPT | Performed by: INTERNAL MEDICINE

## 2017-08-29 ENCOUNTER — OFFICE VISIT (OUTPATIENT)
Dept: PULMONOLOGY | Facility: CLINIC | Age: 72
End: 2017-08-29

## 2017-08-29 VITALS
TEMPERATURE: 98.4 F | HEIGHT: 62 IN | SYSTOLIC BLOOD PRESSURE: 107 MMHG | HEART RATE: 97 BPM | WEIGHT: 200 LBS | BODY MASS INDEX: 36.8 KG/M2 | DIASTOLIC BLOOD PRESSURE: 67 MMHG | OXYGEN SATURATION: 88 %

## 2017-08-29 DIAGNOSIS — J30.2 SEASONAL ALLERGIC RHINITIS, UNSPECIFIED ALLERGIC RHINITIS TRIGGER: ICD-10-CM

## 2017-08-29 DIAGNOSIS — R94.2 RESTRICTIVE VENTILATORY DEFECT: ICD-10-CM

## 2017-08-29 DIAGNOSIS — E66.9 OBESITY (BMI 35.0-39.9 WITHOUT COMORBIDITY): ICD-10-CM

## 2017-08-29 DIAGNOSIS — G47.33 OSA (OBSTRUCTIVE SLEEP APNEA): Primary | ICD-10-CM

## 2017-08-29 PROCEDURE — 99214 OFFICE O/P EST MOD 30 MIN: CPT | Performed by: INTERNAL MEDICINE

## 2017-08-29 RX ORDER — LORATADINE 10 MG/1
10 TABLET ORAL DAILY
Qty: 30 TABLET | Refills: 3 | Status: ON HOLD | OUTPATIENT
Start: 2017-08-29 | End: 2018-08-11

## 2017-08-29 NOTE — PROGRESS NOTES
Subjective   Emily Roberto is a 72 y.o. female who is being seen for Sleep Apnea    History of Present Illness   Patient returns after pulmonary function test.  During her last visit we prescribed pressure therapy for ordered to severe obstructive sleep apnea.  Unfortunately patient does me that she did not get the machine yet, apparently there was a mistake and machine was delivered to a different person according to patient.    Symptomatically she still continues to have fragmented sleep with unrefreshed feeling and increased fatigue in the daytime.  Additionally patient has a new symptom now off sneezing and runny nose with episodic nasal congestion.  She tells me that she gets that was twice a year.  Past Medical History:   Diagnosis Date   • CAD (coronary artery disease), native coronary artery 12/3/2016   • Centrilobular emphysema 12/8/2016   • Diabetes    • Hypercholesteremia    • Hypertension    • Osteoporosis      Past Surgical History:   Procedure Laterality Date   • CARDIAC CATHETERIZATION N/A 12/1/2016    Procedure: Left Heart Cath;  Surgeon: Nate Butler MD;  Location:  [facility];  Service:    • CARDIAC CATHETERIZATION N/A 12/5/2016    Procedure: Left Heart Cath;  Surgeon: Alyssa Montalvo MD;  Location:  [facility];  Service:    • CARDIAC CATHETERIZATION N/A 1/11/2017    Procedure: Left Heart Cath/STENT RCA;  Surgeon: Nate Butler MD;  Location:  [facility];  Service:    • CATARACT EXTRACTION     • CHOLECYSTECTOMY       Family History   Problem Relation Age of Onset   • Hypertension Mother    • Heart disease Mother    • Diabetes Mother    • Hypertension Father    • Heart disease Father    • Diabetes Father    • Cancer Sister    • Heart disease Sister    • Hypertension Sister       reports that she quit smoking about 8 months ago. Her smoking use included Cigarettes. She has a 10.00 pack-year smoking history. She has never used smokeless  "tobacco. She reports that she does not drink alcohol or use illicit drugs.  No Known Allergies        The following portions of the patient's history were reviewed and updated as appropriate: allergies, current medications, past family history, past medical history, past social history, past surgical history and problem list.    Review of Systems   Constitutional: Positive for fatigue (with increased daytime sleepiness).   HENT: Positive for congestion, postnasal drip and sinus pressure.         Loud snoring   Respiratory: Positive for apnea (Witnessed apnea per family/spouse), cough and shortness of breath (exhustional).    Cardiovascular: Positive for leg swelling.   Allergic/Immunologic: Positive for environmental allergies.   Neurological: Positive for light-headedness and headaches.   Psychiatric/Behavioral: Positive for sleep disturbance (Fragmented sleep with unrefreshed feeling in the morning ).        SLEEP: Loud snoring, fragmented sleep, un refreshed feeling in the morning, increased daytime sleepiness    All other systems reviewed and are negative.      Objective   /67 (BP Location: Left arm, Patient Position: Sitting, Cuff Size: Adult)  Pulse 97  Temp 98.4 °F (36.9 °C) (Oral)   Ht 62\" (157.5 cm)  Wt 200 lb (90.7 kg)  SpO2 (!) 88%  BMI 36.58 kg/m2  Physical Exam   Constitutional: She is oriented to person, place, and time.   HENT:   Head: Normocephalic and atraumatic.   Nose: Mucosal edema present.   Eyes: EOM are normal. Pupils are equal, round, and reactive to light.   Neck: Neck supple.   Cardiovascular: Normal rate, regular rhythm and normal heart sounds.    Pulmonary/Chest: She has rhonchi.   Vesicular breath sound bilaterally with prolonged expiratory phase   Abdominal: Soft. Bowel sounds are normal.   Musculoskeletal: Normal range of motion. She exhibits no deformity.   Neurological: She is alert and oriented to person, place, and time.   Skin: Skin is warm and dry.   Psychiatric: She " has a normal mood and affect. Her behavior is normal.   Nursing note and vitals reviewed.        Radiology:  Xr Chest 2 View    Result Date: 5/24/2017  Enlarged cardiac silhouette with changes of COPD in the lungs, stable in comparing with a film from 2016.  This report was finalized on 5/24/2017 9:22 AM by Dr. Christ Machado II, MD.        Lab Results:  Admission on 05/23/2017, Discharged on 05/23/2017   Component Date Value Ref Range Status   • Glucose 05/23/2017 145* 70 - 110 mg/dL Final   • BUN 05/23/2017 12  7 - 21 mg/dL Final   • Creatinine 05/23/2017 0.86  0.43 - 1.29 mg/dL Final   • Sodium 05/23/2017 143  135 - 153 mmol/L Final   • Potassium 05/23/2017 2.9* 3.5 - 5.3 mmol/L Final   • Chloride 05/23/2017 107  99 - 112 mmol/L Final   • CO2 05/23/2017 28.1  24.3 - 31.9 mmol/L Final   • Calcium 05/23/2017 10.0  7.7 - 10.0 mg/dL Final   • Total Protein 05/23/2017 7.8  6.0 - 8.0 g/dL Final   • Albumin 05/23/2017 4.30  3.40 - 4.80 g/dL Final   • ALT (SGPT) 05/23/2017 12  10 - 36 U/L Final   • AST (SGOT) 05/23/2017 25  10 - 30 U/L Final   • Alkaline Phosphatase 05/23/2017 63  35 - 104 U/L Final   • Total Bilirubin 05/23/2017 0.7  0.2 - 1.8 mg/dL Final   • eGFR Non  Amer 05/23/2017 65  >60 mL/min/1.73 Final   • Globulin 05/23/2017 3.5  gm/dL Final   • A/G Ratio 05/23/2017 1.2* 1.5 - 2.5 g/dL Final   • BUN/Creatinine Ratio 05/23/2017 14.0  7.0 - 25.0 Final   • Anion Gap 05/23/2017 7.9  3.6 - 11.2 mmol/L Final   • BNP 05/23/2017 174.0* 0.0 - 100.0 pg/mL Final   • Troponin I 05/23/2017 0.022  <=0.040 ng/mL Final   • Extra Tube 05/23/2017 hold for add-on   Final   • Extra Tube 05/23/2017 Hold for add-ons.   Final   • Extra Tube 05/23/2017 hold for add-on   Final   • Extra Tube 05/23/2017 Hold for add-ons.   Final   • WBC 05/23/2017 9.41  4.50 - 12.50 10*3/mm3 Final   • RBC 05/23/2017 3.98* 4.20 - 5.40 10*6/mm3 Final   • Hemoglobin 05/23/2017 11.7* 12.0 - 16.0 g/dL Final   • Hematocrit 05/23/2017 35.3* 37.0 - 47.0  % Final   • MCV 05/23/2017 88.7  80.0 - 94.0 fL Final   • MCH 05/23/2017 29.4  27.0 - 33.0 pg Final   • MCHC 05/23/2017 33.1  33.0 - 37.0 g/dL Final   • RDW 05/23/2017 13.2  11.5 - 14.5 % Final   • RDW-SD 05/23/2017 41.2  37.0 - 54.0 fl Final   • MPV 05/23/2017 10.1* 6.0 - 10.0 fL Final   • Platelets 05/23/2017 302  130 - 400 10*3/mm3 Final   • Neutrophil % 05/23/2017 75.5* 40.0 - 75.0 % Final   • Lymphocyte % 05/23/2017 17.4  16.0 - 46.0 % Final   • Monocyte % 05/23/2017 5.6  0.0 - 12.0 % Final   • Eosinophil % 05/23/2017 0.9  0.0 - 7.0 % Final   • Basophil % 05/23/2017 0.2  0.0 - 2.0 % Final   • Immature Grans % 05/23/2017 0.4  0.0 - 0.5 % Final   • Neutrophils, Absolute 05/23/2017 7.10* 1.40 - 6.50 10*3/mm3 Final   • Lymphocytes, Absolute 05/23/2017 1.64  1.00 - 3.00 10*3/mm3 Final   • Monocytes, Absolute 05/23/2017 0.53  0.10 - 0.90 10*3/mm3 Final   • Eosinophils, Absolute 05/23/2017 0.08  0.00 - 0.70 10*3/mm3 Final   • Basophils, Absolute 05/23/2017 0.02  0.00 - 0.30 10*3/mm3 Final   • Immature Grans, Absolute 05/23/2017 0.04* 0.00 - 0.03 10*3/mm3 Final   • Site 05/23/2017 Arterial: left brachial   Final   • Alphonse's Test 05/23/2017 N/A   Final   • pH, Arterial 05/23/2017 7.412  7.350 - 7.450 pH units Final   • pCO2, Arterial 05/23/2017 36.7  35.0 - 45.0 mm Hg Final   • pO2, Arterial 05/23/2017 78.9* 80.0 - 100.0 mm Hg Final   • HCO3, Arterial 05/23/2017 22.8  22.0 - 26.0 mmol/L Final   • Base Excess, Arterial 05/23/2017 -1.4  mmol/L Final   • O2 Saturation, Arterial 05/23/2017 95.0  90.0 - 100.0 % Final   • Hemoglobin, Blood Gas 05/23/2017 12.4  12 - 16 g/dL Final   • Hematocrit, Blood Gas 05/23/2017 36.0* 37.0 - 47.0 % Final   • Oxyhemoglobin 05/23/2017 92.9  85 - 100 % Final   • Methemoglobin 05/23/2017 0.4  0 - 3 % Final   • Carboxyhemoglobin 05/23/2017 1.8  0 - 5 % Final   • A-a Gradiant 05/23/2017 67.4  0.0 - 300.0 mmHg Final   • Temperature 05/23/2017 98.6  C Final   • Barometric Pressure for Blood Gas  05/23/2017 724  mmHg Final   • Modality 05/23/2017 Cannula - Nasal   Final   • FIO2 05/23/2017 28  % Final   • Osmolality Calc 05/23/2017 287.3  273.0 - 305.0 mOsm/kg Final       Assessment      ICD-10-CM ICD-9-CM   1. JOSSUE (obstructive sleep apnea) G47.33 327.23   2. Obesity (BMI 35.0-39.9 without comorbidity) E66.9 278.00   3. Seasonal allergic rhinitis, unspecified allergic rhinitis trigger J30.2 477.9   4. Restrictive ventilatory defect R94.2 794.2                DISCUSSION:  Patient had significant obstructive sleep apnea noted in her polysomnography.  We did prescribe pressure therapy during her last visit unfortunately she did not get it yet.  We are giving her another prescription today for an AutoPap with a pressure setting of 5-15 cm water.    I have reviewed her pulmonary function test, no evidence of airflow limitation was noted, patient had some restrictive ventilatory defect which I suspect is from obesity.  We had a good discussion about that and encouraged her to try to lose some weight.    Patient had symptoms of allergic rhinitis for the past month or so, she tells me that she usually gets them during the season.  I'm giving her Claritin 10 mg a day with 3 refills.    I would see her again in approximately a month from now.    Plan    Orders Placed This Encounter   Procedures   • PAP Therapy     New Medications Ordered This Visit   Medications   • loratadine (CLARITIN) 10 MG tablet     Sig: Take 1 tablet by mouth Daily.     Dispense:  30 tablet     Refill:  3                  Diego Potter MD, FCCP, FAASM  Pulmonary, Critical Care, and Sleep Medicine

## 2017-09-18 ENCOUNTER — OFFICE VISIT (OUTPATIENT)
Dept: CARDIOLOGY | Facility: CLINIC | Age: 72
End: 2017-09-18

## 2017-09-18 VITALS
OXYGEN SATURATION: 90 % | HEART RATE: 80 BPM | SYSTOLIC BLOOD PRESSURE: 131 MMHG | HEIGHT: 62 IN | WEIGHT: 206.9 LBS | BODY MASS INDEX: 38.07 KG/M2 | DIASTOLIC BLOOD PRESSURE: 73 MMHG

## 2017-09-18 DIAGNOSIS — I10 ESSENTIAL HYPERTENSION: ICD-10-CM

## 2017-09-18 DIAGNOSIS — I25.118 CORONARY ARTERY DISEASE INVOLVING NATIVE CORONARY ARTERY OF NATIVE HEART WITH OTHER FORM OF ANGINA PECTORIS (HCC): Primary | ICD-10-CM

## 2017-09-18 DIAGNOSIS — E78.2 MIXED HYPERLIPIDEMIA: ICD-10-CM

## 2017-09-18 PROCEDURE — 99213 OFFICE O/P EST LOW 20 MIN: CPT | Performed by: NURSE PRACTITIONER

## 2017-09-18 RX ORDER — OMEPRAZOLE 20 MG/1
20 CAPSULE, DELAYED RELEASE ORAL DAILY
COMMUNITY
End: 2018-03-19 | Stop reason: ALTCHOICE

## 2017-09-18 RX ORDER — AMLODIPINE BESYLATE 5 MG/1
5 TABLET ORAL DAILY
COMMUNITY
Start: 2017-09-11

## 2017-09-18 NOTE — PROGRESS NOTES
Subjective     Chief Complaint: Coronary Artery Disease (S/P BENTLEY to LAD/diagonal branch 12/6/16 and RCA on 1/11/17)    History of Present Illness   Emily Roberto is a 72 y.o. female who presents for follow up for coronary artery disease.  She initially presented with unstable angina and underwent cardiac catheterization which revealed two-vessel disease involving the LAD and the right coronary arteries.  She was referred for bypass surgery however, was thought to be high risk for cardiac surgery.  She underwent drug-eluting stent implantation to the LAD and diagonal branch in December and had staged intervention to her right coronary artery in January.    Today she is doing well.  She denies chest pain, palpitations, or shortness of breath.  She does report some lightheadedness which occurs in the morning when she gets out of bed.  She says she just gets unsteady on her feet, but has never fallen.  This apparently only occurs in the morning upon rising.  She states that her PCP recently placed her on amlodipine because her blood pressure was getting to high.    Ms. Roberto also reports that she needs to have a tooth pulled and she is asking to be taken off of her Plavix and aspirin for this.    See ROS      Current Outpatient Prescriptions:   •  amLODIPine (NORVASC) 5 MG tablet, , Disp: , Rfl:   •  aspirin 325 MG EC tablet, Take 1 tablet by mouth Daily., Disp: 30 tablet, Rfl: 11  •  atorvastatin (LIPITOR) 20 MG tablet, Take 1 tablet by mouth Every Night., Disp: 30 tablet, Rfl: 11  •  clopidogrel (PLAVIX) 75 MG tablet, Take 1 tablet by mouth Daily., Disp: 30 tablet, Rfl: 11  •  diazePAM (VALIUM) 2 MG tablet, Take 5 mg by mouth Daily., Disp: , Rfl:   •  gabapentin (NEURONTIN) 800 MG tablet, Take 800 mg by mouth 3 (Three) Times a Day., Disp: , Rfl:   •  HYDROcodone-acetaminophen (NORCO)  MG per tablet, Take 1 tablet by mouth 3 (Three) Times a Day As Needed for moderate pain (4-6). Patient had a ½ tablet this  morning., Disp: , Rfl:   •  isosorbide mononitrate (IMDUR) 30 MG 24 hr tablet, Take 1 tablet by mouth Every Morning. (Patient taking differently: Take 20 mg by mouth Every Morning.), Disp: 30 tablet, Rfl: 11  •  metFORMIN (GLUCOPHAGE) 500 MG tablet, Take 500 mg by mouth 2 (Two) Times a Day With Meals., Disp: , Rfl:   •  metoprolol tartrate (LOPRESSOR) 25 MG tablet, Take 1 tablet by mouth 2 (Two) Times a Day., Disp: 60 tablet, Rfl: 11  •  nitroglycerin (NITROSTAT) 0.4 MG SL tablet, Place 0.4 mg under the tongue Every 5 (Five) Minutes As Needed for chest pain. Take no more than 3 doses in 15 minutes., Disp: , Rfl:   •  O2 (OXYGEN), Inhale 2 L/min 1 (One) Time., Disp: , Rfl:   •  omeprazole (priLOSEC) 20 MG capsule, Take 20 mg by mouth Daily., Disp: , Rfl:   •  vitamin D (ERGOCALCIFEROL) 95901 UNITS capsule capsule, Take 50,000 Units by mouth Every 7 (Seven) Days. Patient takes on friday, Disp: , Rfl:   •  albuterol (PROAIR HFA) 108 (90 BASE) MCG/ACT inhaler, Inhale 2 puffs Every 4 (Four) Hours As Needed for Wheezing., Disp: 1 inhaler, Rfl: 6  •  budesonide-formoterol (SYMBICORT) 160-4.5 MCG/ACT inhaler, Inhale 2 puffs 2 (Two) Times a Day., Disp: 1 inhaler, Rfl: 0  •  budesonide-formoterol (SYMBICORT) 160-4.5 MCG/ACT inhaler, Inhale 2 puffs 2 (Two) Times a Day., Disp: 1 inhaler, Rfl: 6  •  lisinopril-hydrochlorothiazide (PRINZIDE,ZESTORETIC) 20-25 MG per tablet, Take 1 tablet by mouth Daily., Disp: , Rfl:   •  loratadine (CLARITIN) 10 MG tablet, Take 1 tablet by mouth Daily., Disp: 30 tablet, Rfl: 3  •  sennosides-docusate sodium (SENOKOT-S) 8.6-50 MG tablet, Take 2 tablets by mouth Daily., Disp: , Rfl:      The following portions of the patient's history were reviewed and updated as appropriate: allergies, current medications, past family history, past medical history, past social history, past surgical history and problem list.    Review of Systems   Musculoskeletal: Positive for myalgias.   Neurological: Positive  "for light-headedness.   Psychiatric/Behavioral: Positive for confusion and sleep disturbance.     14 point review of systems negative except as noted above.    Objective      /73 (BP Location: Right arm, Patient Position: Sitting)  Pulse 80  Ht 62\" (157.5 cm)  Wt 206 lb 14.4 oz (93.8 kg)  SpO2 90%  BMI 37.84 kg/m2    Physical Exam   Constitutional: She appears well-developed and well-nourished.   HENT:   Head: Normocephalic and atraumatic.   Eyes: Pupils are equal, round, and reactive to light.   Neck: No JVD present.   Cardiovascular: Normal rate, regular rhythm, S1 normal, S2 normal, normal heart sounds and intact distal pulses.  Exam reveals no gallop and no friction rub.    No murmur heard.  Pulses:       Radial pulses are 2+ on the right side, and 2+ on the left side.        Dorsalis pedis pulses are 2+ on the right side, and 2+ on the left side.        Posterior tibial pulses are 2+ on the right side, and 2+ on the left side.   Pulmonary/Chest: Effort normal and breath sounds normal. No respiratory distress. She has no wheezes. She has no rales.   Abdominal: Soft. She exhibits no mass. There is no tenderness. No hernia.   Skin: Skin is warm and dry.   Psychiatric: She has a normal mood and affect.       Procedures    Assessment/Plan       Emily was seen today for coronary artery disease.    Diagnoses and all orders for this visit:    1.)  Coronary artery disease involving native coronary artery of native heart with other form of angina pectoris     Her coronary artery disease appears to be stable at this time. She denies angina or angina-like symptoms.  I will have her continue medications as prescribed.  With regard to her request to stop the Plavix and ASA to have a tooth pulled, I have instructed Ms Robreto that she may not do this.  I told her that it is imperative that she continues her anti coagulation therapy for at least one year status post stent placement or risk re-stenosis of the " stents.  She has stated understanding.      2.)  Essential hypertension     Her blood pressure today is good.  Her symptoms of light headedness as described is consistent with orthostatic hypotension. I have reviewed with her precautions with regard to this probable side effect of her medications.  If symptoms worsen, she has been instructed to contact this office of her PCP.      3.)  Mixed hyperlipidemia      I do not have access to her most recent lipid panel and she is not taking the maximum atorvastatin, I have asked for copies of her most recent labs from her PCP.  In the meantime, I have asked her to continue her current medications.       4.) Risk Factor Modification     I have discussed a heart healthy diet with the patient.  I have encouraged a diet high in fruits and vegetables as well as lean protein and whole grains.  I have indicated to the patient that even a modest reduction of 3-5% in his/her weight can provide health benefits by decreasing blood sugar and triglycerides; a greater than 5% weight reduction can improve blood pressure, lipids and reduce the need for some medications.       I have encouraged the patient to increase their activity.  I have recommended that they begin a walking program or participate in other activity.  I have advised them that the recommendation is to participate in physical activity at least 30 minutes a day, 5-7 days a week.      Return to clinic in 6 months.    ASHVIN Sosa

## 2018-02-22 ENCOUNTER — HOSPITAL ENCOUNTER (OUTPATIENT)
Dept: RESPIRATORY THERAPY | Facility: HOSPITAL | Age: 73
Discharge: HOME | End: 2018-02-22
Attending: INTERNAL MEDICINE | Admitting: INTERNAL MEDICINE

## 2018-02-22 ENCOUNTER — HOSPITAL ENCOUNTER (OUTPATIENT)
Dept: CT IMAGING | Facility: HOSPITAL | Age: 73
Discharge: HOME | End: 2018-02-22
Attending: INTERNAL MEDICINE

## 2018-02-22 DIAGNOSIS — J41.0 SIMPLE CHRONIC BRONCHITIS (HCC): ICD-10-CM

## 2018-02-22 DIAGNOSIS — J84.9 INTERSTITIAL LUNG DISEASE (HCC): ICD-10-CM

## 2018-02-22 DIAGNOSIS — G47.33 OSA (OBSTRUCTIVE SLEEP APNEA): ICD-10-CM

## 2018-02-22 PROCEDURE — 94727 GAS DIL/WSHOT DETER LNG VOL: CPT | Performed by: INTERNAL MEDICINE

## 2018-02-22 PROCEDURE — A9270 NON-COVERED ITEM OR SERVICE: HCPCS

## 2018-02-22 PROCEDURE — 94729 DIFFUSING CAPACITY: CPT

## 2018-02-22 PROCEDURE — 94729 DIFFUSING CAPACITY: CPT | Performed by: INTERNAL MEDICINE

## 2018-02-22 PROCEDURE — 94060 EVALUATION OF WHEEZING: CPT | Performed by: INTERNAL MEDICINE

## 2018-02-22 PROCEDURE — 71250 CT THORAX DX C-: CPT | Performed by: RADIOLOGY

## 2018-02-22 PROCEDURE — 71250 CT THORAX DX C-: CPT

## 2018-02-22 PROCEDURE — 63710000001 ALBUTEROL PER 1 MG

## 2018-02-22 PROCEDURE — 94727 GAS DIL/WSHOT DETER LNG VOL: CPT

## 2018-02-22 PROCEDURE — 94060 EVALUATION OF WHEEZING: CPT

## 2018-03-19 ENCOUNTER — OFFICE VISIT (OUTPATIENT)
Dept: CARDIOLOGY | Facility: CLINIC | Age: 73
End: 2018-03-19

## 2018-03-19 VITALS
OXYGEN SATURATION: 93 % | HEIGHT: 62 IN | BODY MASS INDEX: 36.9 KG/M2 | HEART RATE: 90 BPM | WEIGHT: 200.5 LBS | SYSTOLIC BLOOD PRESSURE: 137 MMHG | DIASTOLIC BLOOD PRESSURE: 84 MMHG

## 2018-03-19 DIAGNOSIS — I25.118 CORONARY ARTERY DISEASE INVOLVING NATIVE CORONARY ARTERY OF NATIVE HEART WITH OTHER FORM OF ANGINA PECTORIS (HCC): Primary | ICD-10-CM

## 2018-03-19 DIAGNOSIS — E78.2 MIXED HYPERLIPIDEMIA: ICD-10-CM

## 2018-03-19 DIAGNOSIS — I10 ESSENTIAL HYPERTENSION: ICD-10-CM

## 2018-03-19 DIAGNOSIS — R55 POSTURAL DIZZINESS WITH PRESYNCOPE: ICD-10-CM

## 2018-03-19 DIAGNOSIS — R42 POSTURAL DIZZINESS WITH PRESYNCOPE: ICD-10-CM

## 2018-03-19 PROCEDURE — 99213 OFFICE O/P EST LOW 20 MIN: CPT | Performed by: NURSE PRACTITIONER

## 2018-03-19 RX ORDER — ASPIRIN 81 MG/1
81 TABLET ORAL DAILY
Qty: 30 TABLET | Refills: 11 | Status: SHIPPED | OUTPATIENT
Start: 2018-03-19 | End: 2018-08-15 | Stop reason: HOSPADM

## 2018-03-19 NOTE — PATIENT INSTRUCTIONS
MyPlate from [employer]  The general, healthful diet is based on the 2010 Dietary Guidelines for Americans. The amount of food you need to eat from each food group depends on your age, sex, and level of physical activity and can be individualized by a dietitian. Go to ChooseMyPlate.gov for more information.  What do I need to know about the MyPlate plan?  · Enjoy your food, but eat less.  · Avoid oversized portions.  ¨ ½ of your plate should include fruits and vegetables.  ¨ ¼ of your plate should be grains.  ¨ ¼ of your plate should be protein.  Grains   · Make at least half of your grains whole grains.  · For a 2,000 calorie daily food plan, eat 6 oz every day.  · 1 oz is about 1 slice bread, 1 cup cereal, or ½ cup cooked rice, cereal, or pasta.  Vegetables   · Make half your plate fruits and vegetables.  · For a 2,000 calorie daily food plan, eat 2½ cups every day.  · 1 cup is about 1 cup raw or cooked vegetables or vegetable juice or 2 cups raw leafy greens.  Fruits   · Make half your plate fruits and vegetables.  · For a 2,000 calorie daily food plan, eat 2 cups every day.  · 1 cup is about 1 cup fruit or 100% fruit juice or ½ cup dried fruit.  Protein   · For a 2,000 calorie daily food plan, eat 5½ oz every day.  · 1 oz is about 1 oz meat, poultry, or fish, ¼ cup cooked beans, 1 egg, 1 Tbsp peanut butter, or ½ oz nuts or seeds.  Dairy   · Switch to fat-free or low-fat (1%) milk.  · For a 2,000 calorie daily food plan, eat 3 cups every day.  · 1 cup is about 1 cup milk or yogurt or soy milk (soy beverage), 1½ oz natural cheese, or 2 oz processed cheese.  Fats, Oils, and Empty Calories   · Only small amounts of oils are recommended.  · Empty calories are calories from solid fats or added sugars.  · Compare sodium in foods like soup, bread, and frozen meals. Choose the foods with lower numbers.  · Drink water instead of sugary drinks.  What foods can I eat?  Grains   Whole grains such as whole wheat, quinoa,  millet, and bulgur. Bread, rolls, and pasta made from whole grains. Brown or wild rice. Hot or cold cereals made from whole grains and without added sugar.  Vegetables   All fresh vegetables, especially fresh red, dark green, or orange vegetables. Peas and beans. Low-sodium frozen or canned vegetables prepared without added salt. Low-sodium vegetable juices.  Fruits   All fresh, frozen, and dried fruits. Canned fruit packed in water or fruit juice without added sugar. Fruit juices without added sugar.  Meats and Other Protein Sources   Boiled, baked, or grilled lean meat trimmed of fat. Skinless poultry. Fresh seafood and shellfish. Canned seafood packed in water. Unsalted nuts and unsalted nut butters. Tofu. Dried beans and pea. Eggs.  Dairy   Low-fat or fat-free milk, yogurt, and cheeses.  Sweets and Desserts   Frozen desserts made from low-fat milk.  Fats and Oils   Olive, peanut, and canola oils and margarine. Salad dressing and mayonnaise made from these oils.  Other   Soups and casseroles made from allowed ingredients and without added fat or salt.  The items listed above may not be a complete list of recommended foods or beverages. Contact your dietitian for more options.   What foods are not recommended?  Grains   Sweetened, low-fiber cereals. Packaged baked goods. Snack crackers and chips. Cheese crackers, butter crackers, and biscuits. Frozen waffles, sweet breads, doughnuts, pastries, packaged baking mixes, pancakes, cakes, and cookies.  Vegetables   Regular canned or frozen vegetables or vegetables prepared with salt. Canned tomatoes. Canned tomato sauce. Fried vegetables. Vegetables in cream sauce or cheese sauce.  Fruits   Fruits packed in syrup or made with added sugar.  Meats and Other Protein Sources   Marbled or fatty meats such as ribs. Poultry with skin. Fried meats, poultry, eggs, or fish. Sausages, hot dogs, and deli meats such as pastrami, bologna, or salami.  Dairy   Whole milk, cream,  cheeses made from whole milk, sour cream. Ice cream or yogurt made from whole milk or with added sugar.  Beverages   For adults, no more than one alcoholic drink per day. Regular soft drinks or other sugary beverages. Juice drinks.  Sweets and Desserts   Sugary or fatty desserts, candy, and other sweets.  Fats and Oils   Solid shortening or partially hydrogenated oils. Solid margarine. Margarine that contains trans fats. Butter.  The items listed above may not be a complete list of foods and beverages to avoid. Contact your dietitian for more information.   This information is not intended to replace advice given to you by your health care provider. Make sure you discuss any questions you have with your health care provider.  Document Released: 01/06/2009 Document Revised: 05/25/2017 Document Reviewed: 11/26/2014  [employer] Interactive Patient Education © 2017 [employer]    Serving Sizes  A serving size is a measured amount of food or drink, such as one slice of bread, that has an associated nutrient content. Knowing the serving size of a food or drink can help you determine how much of that food you should consume.  What is the size of one serving?  The size of one healthy serving depends on the food or drink. To determine a serving size, read the food label. If the food or drink does not have a food label, try to find serving size information online. Or, use the following to estimate the size of one adult serving:  Grain   1 slice bread. ½ bagel. ½ cup pasta.  Vegetable   ½ cup cooked or canned vegetables. 1 cup raw, leafy greens.  Fruit   ½ cup canned fruit. 1 medium fruit. ¼ cup dried fruit.  Meat and Other Protein Sources   1 oz meat, poultry, or fish. ¼ cup cooked beans. 1 egg. ¼ cup nuts or seeds. 1 Tbsp nut butter. ¼ cup tofu or tempeh. 2 Tbsp hummus.  Dairy   An individual container of yogurt (6-8 oz). 1 piece of cheese the size of your thumb (1 oz). 1 cup (8 oz) milk or milk alternative.  Fat   A piece  the size of one dice. 1 tsp soft margarine. 1 Tbsp mayonnaise. 1 tsp vegetable oil. 1 Tbsp regular salad dressing. 2 Tbsp low-fat salad dressing.  How many servings should I eat from each food group each day?  The following are the suggested number of servings to try and have every day from each food group. You can also look at your eating throughout the week and aim for meeting these requirements on most days for overall healthy eating.  Grain   6-8 servings. Try to have half of your grains from whole grains, such as whole wheat bread, corn tortillas, oatmeal, brown rice, whole wheat pasta, and bulgur.  Vegetable   At least 2½-3 servings.  Fruit   2 servings.  Meat and Other Protein Foods   5-6 servings. Aim to have lean proteins, such as chicken, turkey, fish, beans, or tofu.  Dairy   3 servings. Choose low-fat or nonfat if you are trying to control your weight.  Fat   2-3 servings.  Is a serving the same thing as a portion?  No. A portion is the actual amount you eat, which may be more than one serving. Knowing the specific serving size of a food and the nutritional information that goes with it can help you make a healthy decision on what size portion to eat.  What are some tips to help me learn healthy serving sizes?  · Check food labels for serving sizes. Many foods that come as a single portion actually contain multiple servings.  · Determine the serving size of foods you commonly eat and figure out how large a portion you usually eat.  · Measure the number of servings that can be held by the bowls, glasses, cups, and plates you typically use. For example, pour your breakfast cereal into your regular bowl and then pour it into a measuring cup.  · For 1-2 days, measure the serving sizes of all the foods you eat.  · Practice estimating serving sizes and determining how big your portions should be.  This information is not intended to replace advice given to you by your health care provider. Make sure you  discuss any questions you have with your health care provider.  Document Released: 09/15/2004 Document Revised: 08/12/2017 Document Reviewed: 03/17/2015  [employer] Interactive Patient Education © 2017 [employer]

## 2018-03-19 NOTE — PROGRESS NOTES
"Subjective     Chief Complaint: Coronary Artery Disease; Hyperlipidemia; and Hypertension    Coronary Artery Disease   Symptoms include dizziness. Pertinent negatives include no chest pain, chest tightness, leg swelling, palpitations or shortness of breath. Risk factors include hyperlipidemia.   Hyperlipidemia   Pertinent negatives include no chest pain, myalgias or shortness of breath.   Hypertension   Pertinent negatives include no chest pain, neck pain, palpitations or shortness of breath.      Emily Rboerto is a 72 y.o. female who presents for follow-up of coronary artery disease, hyperlipidemia and hypertension.  She initially presented with unstable angina and underwent cardiac catheterization which revealed two-vessel disease involving the LAD and the right coronary artery.  She was referred for bypass surgery however, was thought to be high risk for cardiac surgery.  Therefore she underwent drug-eluting stent implantation to the LAD and diagonal branch in December 2016 and had staged intervention to her right coronary artery in January 2017.    Ms. Roberto is accompanied by her daughter for today's examination.  Ms. Roberto denies chest pains, palpitations and lower extremity swelling.  She does report that she has had a recent fall.  This, she states, was about the fourth time in the last year.  SHe states that it is like her \"legs give out.\"  She denies loss of consciousness, states that she does not recall being dizzy or having any indication that she was going to fall.  She reports overall sense of weakness.  She states that these episodes have occurred about 4 or 5 times over the last year.  She did not check her blood sugars or blood pressure after these occurrences.  Further she does report that on occasion she has spelled \"a little dizzy\" but this has never occurred with a fall.    She also reports that she has an appointment with her eye doctor in the coming weeks because she thinks that she has " cataracts which will need to be removed.  She will need a cardiac clearance for this.      Current Outpatient Prescriptions:   •  albuterol (PROAIR HFA) 108 (90 BASE) MCG/ACT inhaler, Inhale 2 puffs Every 4 (Four) Hours As Needed for Wheezing., Disp: 1 inhaler, Rfl: 6  •  amLODIPine (NORVASC) 5 MG tablet, , Disp: , Rfl:   •  aspirin 325 MG EC tablet, Take 1 tablet by mouth Daily., Disp: 30 tablet, Rfl: 11  •  atorvastatin (LIPITOR) 20 MG tablet, Take 1 tablet by mouth Every Night., Disp: 30 tablet, Rfl: 11  •  budesonide-formoterol (SYMBICORT) 160-4.5 MCG/ACT inhaler, Inhale 2 puffs 2 (Two) Times a Day., Disp: 1 inhaler, Rfl: 0  •  budesonide-formoterol (SYMBICORT) 160-4.5 MCG/ACT inhaler, Inhale 2 puffs 2 (Two) Times a Day., Disp: 1 inhaler, Rfl: 6  •  clopidogrel (PLAVIX) 75 MG tablet, Take 1 tablet by mouth Daily., Disp: 30 tablet, Rfl: 11  •  diazePAM (VALIUM) 2 MG tablet, Take 5 mg by mouth Daily., Disp: , Rfl:   •  gabapentin (NEURONTIN) 800 MG tablet, Take 800 mg by mouth 3 (Three) Times a Day., Disp: , Rfl:   •  HYDROcodone-acetaminophen (NORCO)  MG per tablet, Take 1 tablet by mouth 3 (Three) Times a Day As Needed for moderate pain (4-6). Patient had a ½ tablet this morning., Disp: , Rfl:   •  isosorbide mononitrate (IMDUR) 30 MG 24 hr tablet, Take 1 tablet by mouth Every Morning. (Patient taking differently: Take 20 mg by mouth Every Morning.), Disp: 30 tablet, Rfl: 11  •  lisinopril-hydrochlorothiazide (PRINZIDE,ZESTORETIC) 20-25 MG per tablet, Take 1 tablet by mouth Daily., Disp: , Rfl:   •  loratadine (CLARITIN) 10 MG tablet, Take 1 tablet by mouth Daily., Disp: 30 tablet, Rfl: 3  •  metFORMIN (GLUCOPHAGE) 500 MG tablet, Take 500 mg by mouth 2 (Two) Times a Day With Meals., Disp: , Rfl:   •  metoprolol tartrate (LOPRESSOR) 25 MG tablet, Take 1 tablet by mouth 2 (Two) Times a Day., Disp: 60 tablet, Rfl: 11  •  nitroglycerin (NITROSTAT) 0.4 MG SL tablet, Place 0.4 mg under the tongue Every 5 (Five)  "Minutes As Needed for chest pain. Take no more than 3 doses in 15 minutes., Disp: , Rfl:   •  O2 (OXYGEN), Inhale 2 L/min 1 (One) Time., Disp: , Rfl:   •  omeprazole (priLOSEC) 20 MG capsule, Take 20 mg by mouth Daily., Disp: , Rfl:   •  sennosides-docusate sodium (SENOKOT-S) 8.6-50 MG tablet, Take 2 tablets by mouth Daily., Disp: , Rfl:   •  vitamin D (ERGOCALCIFEROL) 78386 UNITS capsule capsule, Take 50,000 Units by mouth Every 7 (Seven) Days. Patient takes on friday, Disp: , Rfl:      The following portions of the patient's history were reviewed and updated as appropriate: allergies, current medications, past family history, past medical history, past social history, past surgical history and problem list.    Review of Systems   Constitutional: Negative for activity change, appetite change and fatigue.   HENT: Negative for congestion and tinnitus.    Eyes: Negative for visual disturbance.   Respiratory: Negative for cough, chest tightness and shortness of breath.    Cardiovascular: Negative for chest pain, palpitations and leg swelling.   Gastrointestinal: Negative for blood in stool, nausea and vomiting.   Endocrine: Negative for cold intolerance, heat intolerance and polyuria.   Genitourinary: Negative for dysuria.   Musculoskeletal: Negative for myalgias and neck pain.   Skin: Negative for rash.   Neurological: Positive for dizziness and weakness. Negative for syncope and light-headedness.   Hematological: Bruises/bleeds easily.   Psychiatric/Behavioral: Positive for sleep disturbance. Negative for confusion.       Objective     /84 (BP Location: Right arm)   Pulse 90   Ht 157.5 cm (62\")   Wt 90.9 kg (200 lb 8 oz)   SpO2 93%   BMI 36.67 kg/m²     Physical Exam   Constitutional: She appears well-developed and well-nourished.   HENT:   Head: Normocephalic and atraumatic.   Eyes: Pupils are equal, round, and reactive to light.   Neck: No JVD present. Carotid bruit is not present.   Cardiovascular: " Normal rate, regular rhythm and intact distal pulses.  Exam reveals no gallop and no friction rub.    No murmur heard.  Pulmonary/Chest: Effort normal and breath sounds normal. No respiratory distress. She has no wheezes. She has no rales.   Abdominal: Soft. She exhibits no mass. There is no tenderness. No hernia.   Skin: Skin is warm and dry.   Psychiatric: She has a normal mood and affect.   Vitals reviewed.      Procedures      Assessment/Plan       Emily was seen today for coronary artery disease, hyperlipidemia and hypertension.    Diagnoses and all orders for this visit:    Coronary artery disease involving native coronary artery of native heart with other form of angina pectoris     Stable.  No complaints of chest discomfort   Antiplatelets: Aspirin 81 and Lasix 75   Beta blocker: Lopressor 25 twice a day   Imdur 30 mg daily   Continue current medications   Risk factor modification as indicated    Essential hypertension    ` Blood pressure today shows good control   Continue current medications    Mixed hyperlipidemia     Patient on atorvastatin 20 mg, cholesterol followed by PCP    Near syncope      I'm concerned about her reports of falling even though it does not seem to be happening very often and have ordered a carotid Doppler.  I've also ordered a echocardiogram since I do not have a recent reading of left ventricular function.  If these are normal I will defer to her PCP for workup of her falls.      ASHVIN Manzo

## 2018-03-26 ENCOUNTER — APPOINTMENT (OUTPATIENT)
Dept: CARDIOLOGY | Facility: HOSPITAL | Age: 73
End: 2018-03-26

## 2018-03-27 ENCOUNTER — HOSPITAL ENCOUNTER (OUTPATIENT)
Dept: CARDIOLOGY | Facility: HOSPITAL | Age: 73
Discharge: HOME | End: 2018-03-27

## 2018-03-27 ENCOUNTER — HOSPITAL ENCOUNTER (OUTPATIENT)
Dept: CARDIOLOGY | Facility: HOSPITAL | Age: 73
Discharge: HOME | End: 2018-03-27
Admitting: NURSE PRACTITIONER

## 2018-03-27 DIAGNOSIS — R55 POSTURAL DIZZINESS WITH PRESYNCOPE: ICD-10-CM

## 2018-03-27 DIAGNOSIS — R42 POSTURAL DIZZINESS WITH PRESYNCOPE: ICD-10-CM

## 2018-03-27 PROCEDURE — 93880 EXTRACRANIAL BILAT STUDY: CPT

## 2018-03-27 PROCEDURE — 93880 EXTRACRANIAL BILAT STUDY: CPT | Performed by: RADIOLOGY

## 2018-03-27 PROCEDURE — 93306 TTE W/DOPPLER COMPLETE: CPT

## 2018-03-27 PROCEDURE — 93306 TTE W/DOPPLER COMPLETE: CPT | Performed by: INTERNAL MEDICINE

## 2018-03-29 ENCOUNTER — TELEPHONE (OUTPATIENT)
Dept: CARDIOLOGY | Facility: CLINIC | Age: 73
End: 2018-03-29

## 2018-03-29 NOTE — TELEPHONE ENCOUNTER
Patient called back; told her per Shelli Jaramillo, her carotid ultrasound was good.    Patient aware and understands.

## 2018-03-29 NOTE — TELEPHONE ENCOUNTER
----- Message from ASHVIN House sent at 3/27/2018  3:58 PM EDT -----  Please call Ms Roberto and tell her that the carotid ultrasound was good.    Thanks, Shelli

## 2018-04-03 ENCOUNTER — OFFICE VISIT (OUTPATIENT)
Dept: PULMONOLOGY | Facility: CLINIC | Age: 73
End: 2018-04-03

## 2018-04-03 VITALS
HEIGHT: 62 IN | WEIGHT: 199.8 LBS | OXYGEN SATURATION: 93 % | DIASTOLIC BLOOD PRESSURE: 71 MMHG | SYSTOLIC BLOOD PRESSURE: 176 MMHG | HEART RATE: 83 BPM | TEMPERATURE: 97.8 F | BODY MASS INDEX: 36.77 KG/M2

## 2018-04-03 DIAGNOSIS — G47.33 OSA (OBSTRUCTIVE SLEEP APNEA): Primary | ICD-10-CM

## 2018-04-03 DIAGNOSIS — E66.9 OBESITY (BMI 30-39.9): ICD-10-CM

## 2018-04-03 LAB
BH CV ECHO MEAS - % IVS THICK: -4.3 %
BH CV ECHO MEAS - % LVPW THICK: 55.3 %
BH CV ECHO MEAS - ACS: 1.8 CM
BH CV ECHO MEAS - AO MAX PG: 8.3 MMHG
BH CV ECHO MEAS - AO MEAN PG: 4.8 MMHG
BH CV ECHO MEAS - AO ROOT AREA (BSA CORRECTED): 1.5
BH CV ECHO MEAS - AO ROOT AREA: 6.1 CM^2
BH CV ECHO MEAS - AO ROOT DIAM: 2.8 CM
BH CV ECHO MEAS - AO V2 MAX: 143.7 CM/SEC
BH CV ECHO MEAS - AO V2 MEAN: 101.9 CM/SEC
BH CV ECHO MEAS - AO V2 VTI: 31.9 CM
BH CV ECHO MEAS - BSA(HAYCOCK): 2 M^2
BH CV ECHO MEAS - BSA: 1.9 M^2
BH CV ECHO MEAS - BZI_BMI: 36.6 KILOGRAMS/M^2
BH CV ECHO MEAS - BZI_METRIC_HEIGHT: 157.5 CM
BH CV ECHO MEAS - BZI_METRIC_WEIGHT: 90.7 KG
BH CV ECHO MEAS - CONTRAST EF 4CH: 60.5 ML/M^2
BH CV ECHO MEAS - EDV(CUBED): 81.9 ML
BH CV ECHO MEAS - EDV(MOD-SP4): 43 ML
BH CV ECHO MEAS - EDV(TEICH): 85 ML
BH CV ECHO MEAS - EF(CUBED): 78.7 %
BH CV ECHO MEAS - EF(TEICH): 71.2 %
BH CV ECHO MEAS - ESV(CUBED): 17.5 ML
BH CV ECHO MEAS - ESV(MOD-SP4): 17 ML
BH CV ECHO MEAS - ESV(TEICH): 24.5 ML
BH CV ECHO MEAS - FS: 40.3 %
BH CV ECHO MEAS - IVS/LVPW: 1
BH CV ECHO MEAS - IVSD: 1 CM
BH CV ECHO MEAS - IVSS: 1 CM
BH CV ECHO MEAS - LA DIMENSION: 3.3 CM
BH CV ECHO MEAS - LA/AO: 1.2
BH CV ECHO MEAS - LV DIASTOLIC VOL/BSA (35-75): 22.5 ML/M^2
BH CV ECHO MEAS - LV MASS(C)D: 151.1 GRAMS
BH CV ECHO MEAS - LV MASS(C)DI: 79 GRAMS/M^2
BH CV ECHO MEAS - LV MASS(C)S: 101.2 GRAMS
BH CV ECHO MEAS - LV MASS(C)SI: 53 GRAMS/M^2
BH CV ECHO MEAS - LV SYSTOLIC VOL/BSA (12-30): 8.9 ML/M^2
BH CV ECHO MEAS - LVIDD: 4.3 CM
BH CV ECHO MEAS - LVIDS: 2.6 CM
BH CV ECHO MEAS - LVLD AP4: 7 CM
BH CV ECHO MEAS - LVLS AP4: 4.7 CM
BH CV ECHO MEAS - LVOT AREA (M): 2.8 CM^2
BH CV ECHO MEAS - LVOT AREA: 2.7 CM^2
BH CV ECHO MEAS - LVOT DIAM: 1.9 CM
BH CV ECHO MEAS - LVPWD: 1 CM
BH CV ECHO MEAS - LVPWS: 1.6 CM
BH CV ECHO MEAS - MV A MAX VEL: 39.5 CM/SEC
BH CV ECHO MEAS - MV E MAX VEL: 143.8 CM/SEC
BH CV ECHO MEAS - MV E/A: 3.6
BH CV ECHO MEAS - PA ACC SLOPE: 2185 CM/SEC^2
BH CV ECHO MEAS - PA ACC TIME: 0.05 SEC
BH CV ECHO MEAS - PA PR(ACCEL): 55.2 MMHG
BH CV ECHO MEAS - RAP SYSTOLE: 10 MMHG
BH CV ECHO MEAS - RVDD: 1.3 CM
BH CV ECHO MEAS - RVSP: 36 MMHG
BH CV ECHO MEAS - SI(AO): 101.3 ML/M^2
BH CV ECHO MEAS - SI(CUBED): 33.7 ML/M^2
BH CV ECHO MEAS - SI(MOD-SP4): 13.6 ML/M^2
BH CV ECHO MEAS - SI(TEICH): 31.7 ML/M^2
BH CV ECHO MEAS - SV(AO): 193.6 ML
BH CV ECHO MEAS - SV(CUBED): 64.4 ML
BH CV ECHO MEAS - SV(MOD-SP4): 26 ML
BH CV ECHO MEAS - SV(TEICH): 60.5 ML
BH CV ECHO MEAS - TR MAX VEL: 255.2 CM/SEC
MAXIMAL PREDICTED HEART RATE: 149 BPM
STRESS TARGET HR: 127 BPM

## 2018-04-03 PROCEDURE — 99214 OFFICE O/P EST MOD 30 MIN: CPT | Performed by: INTERNAL MEDICINE

## 2018-04-03 NOTE — PROGRESS NOTES
Subjective   Emily Roberto is a 72 y.o. female who is being seen for Sleep Apnea    History of Present Illness   Patient returns for follow-up for sleep apnea.  She is using her AutoPap on a nightly basis but still continues to have significant problem with daytime sleepiness.  She says that she is not sure if this is helping or not.  She remains fatigued and tired for the day and has very low energy.  Also has exertional dyspnea like before.  Past Medical History:   Diagnosis Date   • CAD (coronary artery disease), native coronary artery 12/3/2016   • Centrilobular emphysema 12/8/2016   • Diabetes    • Hypercholesteremia    • Hypertension    • Osteoporosis      Past Surgical History:   Procedure Laterality Date   • CARDIAC CATHETERIZATION N/A 12/1/2016    Procedure: Left Heart Cath;  Surgeon: Nate Butler MD;  Location:  [facility];  Service:    • CARDIAC CATHETERIZATION N/A 12/5/2016    Procedure: Left Heart Cath;  Surgeon: Alyssa Montalvo MD;  Location:  [facility];  Service:    • CARDIAC CATHETERIZATION N/A 1/11/2017    Procedure: Left Heart Cath/STENT RCA;  Surgeon: Nate Butler MD;  Location:  [facility];  Service:    • CATARACT EXTRACTION     • CHOLECYSTECTOMY       Family History   Problem Relation Age of Onset   • Hypertension Mother    • Heart disease Mother    • Diabetes Mother    • Hypertension Father    • Heart disease Father    • Diabetes Father    • Cancer Sister    • Heart disease Sister    • Hypertension Sister       reports that she quit smoking about 15 months ago. Her smoking use included Cigarettes. She has a 10.00 pack-year smoking history. She has never used smokeless tobacco. She reports that she does not drink alcohol or use drugs.  No Known Allergies        Patient has not received the flu shot or pneumonia vaccination.     The following portions of the patient's history were reviewed and updated as appropriate: allergies,  "current medications, past family history, past medical history, past social history, past surgical history and problem list.    Review of Systems   Constitutional: Positive for fatigue. Negative for appetite change, chills, diaphoresis and unexpected weight change.   HENT: Negative for sore throat, trouble swallowing and voice change.    Eyes: Negative for visual disturbance.   Respiratory: Positive for apnea and shortness of breath. Negative for cough, choking and wheezing.    Cardiovascular: Negative for chest pain, palpitations and leg swelling.   Gastrointestinal: Negative for abdominal pain, constipation, diarrhea, nausea and vomiting.   Endocrine: Negative for cold intolerance, heat intolerance, polydipsia, polyphagia and polyuria.   Genitourinary: Negative for difficulty urinating and dysuria.   Musculoskeletal: Negative for gait problem.   Skin: Negative for rash and wound.   Neurological: Negative for syncope and light-headedness.   Hematological: Negative for adenopathy.   Psychiatric/Behavioral: Positive for sleep disturbance. Negative for agitation, behavioral problems and confusion.   All other systems reviewed and are negative.      Objective   /71   Pulse 83   Temp 97.8 °F (36.6 °C)   Ht 157.5 cm (62.01\")   Wt 90.6 kg (199 lb 12.8 oz)   SpO2 93%   BMI 36.53 kg/m²   Physical Exam   Constitutional: She is oriented to person, place, and time.   HENT:   Head: Normocephalic and atraumatic.   Nose: Mucosal edema present.   Eyes: EOM are normal. Pupils are equal, round, and reactive to light.   Neck: Neck supple.   Cardiovascular: Normal rate, regular rhythm and normal heart sounds.    Pulmonary/Chest: She has rhonchi.   Vesicular breath sound bilaterally with prolonged expiratory phase   Abdominal: Soft. Bowel sounds are normal.   Musculoskeletal: Normal range of motion. She exhibits no deformity.   Neurological: She is alert and oriented to person, place, and time.   Skin: Skin is warm and dry. "   Psychiatric: She has a normal mood and affect. Her behavior is normal.   Nursing note and vitals reviewed.        Radiology:  Ct Chest Without Contrast    Result Date: 2/22/2018   1. Central bronchial thickening and mild areas of groundglass attenuation lung bases which can be seen with underlying chronic or acute bronchitis with some atelectasis. No consolidations. 2. Marked cardiomegaly with severe coronary vascular calcifications. 3. Borderline enlargement of the pulmonary arteries. Correlate for pulmonary arterial hypertension. 4. Hiatal hernia. 5. Asymmetric nodular type tissue upper inner quadrant left breast. Correlate with mammography.  This report was finalized on 2/22/2018 3:57 PM by Dr. Colten Hauser MD.        Lab Results:  Hospital Outpatient Visit on 03/27/2018   Component Date Value Ref Range Status   • BSA 03/27/2018 1.9  m^2 In process   • BH CV ECHO BOUBACAR - RVDD 03/27/2018 1.3  cm In process   • IVSd 03/27/2018 1.0  cm In process   • IVSs 03/27/2018 1.0  cm In process   • LVIDd 03/27/2018 4.3  cm In process   • LVIDs 03/27/2018 2.6  cm In process   • LVPWd 03/27/2018 1.0  cm In process   • BH CV ECHO BOUBACAR - LVPWS 03/27/2018 1.6  cm In process   • IVS/LVPW 03/27/2018 1.0   In process   • FS 03/27/2018 40.3  % In process   • EDV(Teich) 03/27/2018 85.0  ml In process   • ESV(Teich) 03/27/2018 24.5  ml In process   • EF(Teich) 03/27/2018 71.2  % In process   • EDV(cubed) 03/27/2018 81.9  ml In process   • ESV(cubed) 03/27/2018 17.5  ml In process   • EF(cubed) 03/27/2018 78.7  % In process   • % IVS thick 03/27/2018 -4.3  % In process   • % LVPW thick 03/27/2018 55.3  % In process   • LV mass(C)d 03/27/2018 151.1  grams In process   • LV mass(C)dI 03/27/2018 79.0  grams/m^2 In process   • LV mass(C)s 03/27/2018 101.2  grams In process   • LV mass(C)sI 03/27/2018 53.0  grams/m^2 In process   • SV(Teich) 03/27/2018 60.5  ml In process   • SI(Teich) 03/27/2018 31.7  ml/m^2 In process   • SV(cubed)  03/27/2018 64.4  ml In process   • SI(cubed) 03/27/2018 33.7  ml/m^2 In process   • Ao root diam 03/27/2018 2.8  cm In process   • Ao root area 03/27/2018 6.1  cm^2 In process   • ACS 03/27/2018 1.8  cm In process   • LA dimension 03/27/2018 3.3  cm In process   • LA/Ao 03/27/2018 1.2   In process   • LVOT diam 03/27/2018 1.9  cm In process   • LVOT area 03/27/2018 2.7  cm^2 In process   • LVOT area(traced) 03/27/2018 2.8  cm^2 In process   • LVLd ap4 03/27/2018 7.0  cm In process   • EDV(MOD-sp4) 03/27/2018 43.0  ml In process   • LVLs ap4 03/27/2018 4.7  cm In process   • ESV(MOD-sp4) 03/27/2018 17.0  ml In process   • EF(MOD-sp4) 03/27/2018 60.5  % In process   • SV(MOD-sp4) 03/27/2018 26.0  ml In process   • SI(MOD-sp4) 03/27/2018 13.6  ml/m^2 In process   • Ao root area (BSA corrected) 03/27/2018 1.5   In process   • CONTRAST EF 4CH 03/27/2018 60.5  ml/m^2 In process   • LV Diastolic corrected for BSA 03/27/2018 22.5  ml/m^2 In process   • LV Systolic corrected for BSA 03/27/2018 8.9  ml/m^2 In process   • MV E max abdoul 03/27/2018 143.8  cm/sec In process   • MV A max abdoul 03/27/2018 39.5  cm/sec In process   • MV E/A 03/27/2018 3.6   In process   • Ao pk abdoul 03/27/2018 143.7  cm/sec In process   • Ao max PG 03/27/2018 8.3  mmHg In process   • Ao V2 mean 03/27/2018 101.9  cm/sec In process   • Ao mean PG 03/27/2018 4.8  mmHg In process   • Ao V2 VTI 03/27/2018 31.9  cm In process   • SV(Ao) 03/27/2018 193.6  ml In process   • SI(Ao) 03/27/2018 101.3  ml/m^2 In process   • PA acc slope 03/27/2018 2185  cm/sec^2 In process   • PA acc time 03/27/2018 0.05  sec In process   • TR max abdoul 03/27/2018 255.2  cm/sec In process   • RVSP(TR) 03/27/2018 36.0  mmHg In process   • RAP systole 03/27/2018 10.0  mmHg In process   • PA pr(Accel) 03/27/2018 55.2  mmHg In process   • BH CV ECHO BOUBACAR - BZI_BMI 03/27/2018 36.6  kilograms/m^2 In process   • BH CV ECHO BOUBACAR - BSA(HAYCOCK) 03/27/2018 2.0  m^2 In process   • BH CV ECHO  BOUBACAR - BZI_METRIC_WEIGHT 03/27/2018 90.7  kg In process   • BH CV ECHO BOUBACAR - BZI_METRIC_HEIGHT 03/27/2018 157.5  cm In process   • Target HR (85%) 03/27/2018 127  bpm In process   • Max. Pred. HR (100%) 03/27/2018 149  bpm In process       Assessment      ICD-10-CM ICD-9-CM   1. JOSSUE (obstructive sleep apnea) G47.33 327.23   2. Obesity (BMI 30-39.9) E66.9 278.00                DISCUSSION:  Patient is still very was symptomatic despite the fact that she is using her AutoPap.  I'm not sure if she has any additional coexisting disturbance or if her pressure requirement is different.  I'm sending her for a pressure titration study which would help in defining her problem.  She may need a different pressure, or a different device like BiPAP.  If her pressure requirement is such that that falls within the AutoPap range of 5-15 cm water then she may have an additional problem like residual hypersomnia versus idiopathic hypersomnolence etc.    I would see her again after the pressure titration study and further management plan will depend on the findings.    Plan    Orders Placed This Encounter   Procedures   • Polysomnography 4 or More Parameters With CPAP     No orders of the defined types were placed in this encounter.                 Diego Potter MD, FCCP, [facility]  Pulmonary, Critical Care, and Sleep Medicine

## 2018-04-06 ENCOUNTER — TELEPHONE (OUTPATIENT)
Dept: CARDIOLOGY | Facility: CLINIC | Age: 73
End: 2018-04-06

## 2018-04-06 NOTE — TELEPHONE ENCOUNTER
----- Message from ASHVIN House sent at 4/4/2018  5:47 PM EDT -----  Please call MsSandor Paul and tell her that her echocardiogram was normal.    Please ask her if she has consulted her eye doctor regarding her eyesight.  And remind her to contact her doctor regarding the falls that she has had.  If she has not done the latter please call Dr. García office and see if you can get her in to be seen.    Thanks Shelli

## 2018-04-09 ENCOUNTER — TELEPHONE (OUTPATIENT)
Dept: CARDIOLOGY | Facility: CLINIC | Age: 73
End: 2018-04-09

## 2018-04-09 NOTE — TELEPHONE ENCOUNTER
Called patient and told her per Shelli Jaramillo, her recent echocardiogram was normal. Patient is aware and understands.    I asked her if she had a chance to consult her eye doctor regarding her eyesight. She states she has an appt with him tomorrow (4/10/18). I also asked if she had had a chance to talk with Dr. García about her recent falls. She states she has called his office and he suspects it has to do with her sugar and has ordered labwork. She is planning on having this done this week sometime and then goes in a couple weeks to have that reviewed.

## 2018-04-16 ENCOUNTER — OFFICE VISIT (OUTPATIENT)
Dept: CARDIOLOGY | Facility: CLINIC | Age: 73
End: 2018-04-16

## 2018-04-16 VITALS
HEART RATE: 76 BPM | OXYGEN SATURATION: 93 % | DIASTOLIC BLOOD PRESSURE: 83 MMHG | SYSTOLIC BLOOD PRESSURE: 141 MMHG | BODY MASS INDEX: 35.63 KG/M2 | HEIGHT: 62 IN | WEIGHT: 193.6 LBS

## 2018-04-16 DIAGNOSIS — I25.118 CORONARY ARTERY DISEASE INVOLVING NATIVE CORONARY ARTERY OF NATIVE HEART WITH OTHER FORM OF ANGINA PECTORIS (HCC): Primary | ICD-10-CM

## 2018-04-16 DIAGNOSIS — I10 ESSENTIAL HYPERTENSION: ICD-10-CM

## 2018-04-16 DIAGNOSIS — E78.2 MIXED HYPERLIPIDEMIA: ICD-10-CM

## 2018-04-16 PROCEDURE — 99213 OFFICE O/P EST LOW 20 MIN: CPT | Performed by: NURSE PRACTITIONER

## 2018-04-16 RX ORDER — PREDNISOLONE ACETATE 10 MG/ML
SUSPENSION/ DROPS OPHTHALMIC
Status: ON HOLD | COMMUNITY
Start: 2018-04-10 | End: 2018-08-11

## 2018-04-16 NOTE — PROGRESS NOTES
Subjective     Chief Complaint: Results (ECHO & CAROTID); Coronary Artery Disease; Hypertension; Hyperlipidemia; and Syncope    History of Present Illness   Emily Roberto is a 72 y.o. female who presents for follow-up of recent cardiac test, coronary artery disease, hyperlipidemia and hypertension.  She initially presented with unstable angina and underwent cardiac catheterization which revealed two-vessel disease involving the LAD and the right coronary artery.  She was referred for bypass surgery however, was thought to be high risk for cardiac surgery.  Therefore she underwent drug-eluting stent implantation to the LAD and diagonal branch in December 2016 and had staged intervention to her right coronary artery in January 2017.     Ms. Roberto is accompanied by her daughter for today's examination.  Ms. Roberto denies chest pains, palpitations and lower extremity swelling.  She reports that she has some shortness of breath, but denies cough and chest tightness.  She reports fatigue, dizziness, lightheadedness and weakness.  The latter is in her legs.  And she reports occasional confusion.    At her last visit she reported that she had fallen several times and that her legs feel weak.  A carotid duplex was ordered as well as a transthoracic echocardiogram.  Patient states that she has talked with her PCP about her lower extremity weakness and he is working her up for diabetic neuropathy.  He has also recommended that she participate in physical therapy.      Current Outpatient Prescriptions:   •  amLODIPine (NORVASC) 5 MG tablet, Take 5 mg by mouth Daily., Disp: , Rfl:   •  aspirin 81 MG EC tablet, Take 1 tablet by mouth Daily., Disp: 30 tablet, Rfl: 11  •  atorvastatin (LIPITOR) 20 MG tablet, Take 1 tablet by mouth Every Night., Disp: 30 tablet, Rfl: 11  •  clopidogrel (PLAVIX) 75 MG tablet, Take 1 tablet by mouth Daily., Disp: 30 tablet, Rfl: 11  •  diazePAM (VALIUM) 2 MG tablet, Take 5 mg by mouth Daily.,  Disp: , Rfl:   •  gabapentin (NEURONTIN) 800 MG tablet, Take 800 mg by mouth 3 (Three) Times a Day., Disp: , Rfl:   •  HYDROcodone-acetaminophen (NORCO)  MG per tablet, Take 1 tablet by mouth 3 (Three) Times a Day As Needed for moderate pain (4-6). Patient had a ½ tablet this morning., Disp: , Rfl:   •  isosorbide mononitrate (IMDUR) 30 MG 24 hr tablet, Take 1 tablet by mouth Every Morning. (Patient taking differently: Take 20 mg by mouth Every Morning.), Disp: 30 tablet, Rfl: 11  •  metFORMIN (GLUCOPHAGE) 500 MG tablet, Take 500 mg by mouth 2 (Two) Times a Day With Meals., Disp: , Rfl:   •  metoprolol tartrate (LOPRESSOR) 25 MG tablet, Take 1 tablet by mouth 2 (Two) Times a Day., Disp: 60 tablet, Rfl: 11  •  O2 (OXYGEN), Inhale 2 L/min 1 (One) Time., Disp: , Rfl:   •  prednisoLONE acetate (PRED FORTE) 1 % ophthalmic suspension, , Disp: , Rfl:   •  vitamin D (ERGOCALCIFEROL) 46139 UNITS capsule capsule, Take 50,000 Units by mouth Every 7 (Seven) Days. Patient takes on friday, Disp: , Rfl:   •  albuterol (PROAIR HFA) 108 (90 BASE) MCG/ACT inhaler, Inhale 2 puffs Every 4 (Four) Hours As Needed for Wheezing., Disp: 1 inhaler, Rfl: 6  •  loratadine (CLARITIN) 10 MG tablet, Take 1 tablet by mouth Daily., Disp: 30 tablet, Rfl: 3  •  nitroglycerin (NITROSTAT) 0.4 MG SL tablet, Place 0.4 mg under the tongue Every 5 (Five) Minutes As Needed for chest pain. Take no more than 3 doses in 15 minutes., Disp: , Rfl:      The following portions of the patient's history were reviewed and updated as appropriate: allergies, current medications, past family history, past medical history, past social history, past surgical history and problem list.    Review of Systems   Constitutional: Positive for appetite change and fatigue. Negative for activity change.   HENT: Negative for congestion and tinnitus.    Eyes: Negative for visual disturbance.   Respiratory: Positive for shortness of breath. Negative for cough and chest tightness.  "   Cardiovascular: Negative for chest pain, palpitations and leg swelling.   Gastrointestinal: Negative for blood in stool, nausea and vomiting.   Endocrine: Negative for cold intolerance, heat intolerance and polyuria.   Genitourinary: Negative for dysuria.   Musculoskeletal: Negative for myalgias and neck pain.   Skin: Negative for rash.   Neurological: Positive for dizziness and light-headedness. Negative for syncope and weakness.   Hematological: Does not bruise/bleed easily.   Psychiatric/Behavioral: Positive for confusion. Negative for sleep disturbance.       Objective     /83 (BP Location: Left arm)   Pulse 76   Ht 157.5 cm (62\")   Wt 87.8 kg (193 lb 9.6 oz)   SpO2 93%   BMI 35.41 kg/m²     Physical Exam   Constitutional: She appears well-developed and well-nourished.   HENT:   Head: Normocephalic and atraumatic.   Eyes: Pupils are equal, round, and reactive to light.   Neck: No JVD present.   Cardiovascular: Normal rate, regular rhythm and intact distal pulses.  Exam reveals no gallop and no friction rub.    No murmur heard.  Pulmonary/Chest: Effort normal and breath sounds normal. No respiratory distress. She has no wheezes. She has no rales.   Abdominal: Soft. She exhibits no mass. There is no tenderness. No hernia.   Skin: Skin is warm and dry.   Psychiatric: She has a normal mood and affect.       Procedures    3/27/2018  Duplex Carotid Ultrasound    Interpretation Summary     DUPLEX CAROTID BILATERAL CAR     REASON FOR EXAM:  Presyncope.      TECHNIQUE:  Multiple real-time color doppler images were obtained.  M-mode Doppler was used for velocity determination and spectral pattern.  Stenosis was evaluated using the NASCET or similar measurement  technique.     RIGHT SIDE: The common carotid artery was normal in caliber. Peak  systolic and diastolic velocities were 54 and 11 cm/s. At the carotid  bifurcation there was no stenosis in the internal carotid artery. Peak  systolic and diastolic " velocities were 70 and 11 cm/s. There is  antegrade flow in the vertebral artery.     LEFT SIDE: The common carotid artery is normal in caliber. Peak systolic  and diastolic velocities are 80 and 14 cm/s. At the carotid bifurcation  there was no demonstratable stenosis. Peak systolic and diastolic  velocities were 59 and 20 cm/s. Vertebral artery flow is antegrade.     IMPRESSION:  Mild atherosclerotic changes are noted in the carotid  systems. By velocity measurements, no hemodynamically significant  plaques or stenoses were demonstrated.     This report was finalized on 3/27/2018 2:11 PM by Dr. Christ Machado II, MD.       3/27/2018  Transthoracic Echocardiogram    Echocardiogram Findings     Left Ventricle Left ventricular systolic function is normal. Estimated EF appears to be in the range of 61 - 65%. Normal left ventricular cavity size and wall thickness noted. Septal wall motion is abnormal.      Right Ventricle Normal right ventricular cavity size and systolic function noted.      Left Atrium Normal left atrial size noted.      Right Atrium The right atrium was not well visualized.      Aortic Valve The valve appears trileaflet. The aortic valve is abnormal in structure. The valve exhibits sclerosis. No aortic valve regurgitation is present. No aortic valve stenosis is present.      Mitral Valve There is mild posterior leaflet thickening present.   No mitral valve regurgitation is present.      Tricuspid Valve Tricuspid valve not well visualized. The tricuspid valve is grossly normal. No tricuspid valve stenosis is present. Trace tricuspid valve regurgitation is present. Estimated right ventricular systolic pressure from tricuspid regurgitation is normal (<35 mmHg).      Pulmonic Valve The pulmonic valve was not well visualized. The pulmonic valve is grossly normal in structure. There is no significant pulmonic valve stenosis present. There is no pulmonic valve regurgitation present.      Pericardium There  is no evidence of pericardial effusion.          Assessment/Plan       Emily was seen today for results, coronary artery disease, hypertension, hyperlipidemia and syncope.    Diagnoses and all orders for this visit:    Coronary artery disease involving native coronary artery of native heart with other form of angina pectoris     Stable   GDMT:  Aspirin 81 mg, Plavix 75 mg, Lopressor 25 mg twice daily   Continue Imdur, amlodipine 5 mg daily     Essential hypertension     Patient keeps track of blood pressure at home only occasionally   Recommended blood pressure diary and instructed the patient to call this office if blood pressures run 140/80 or greater    Mixed hyperlipidemia     Recommended patient follow PCP guidelines regarding her cholesterol    Generalized weakness     No cardiac origin of weakness can be determined   Recommended patient follow PCP guidelines, especially physical therapy because I believe her unsteadiness on her feet is directly related to her deconditioning       Return to clinic in 6 months    Shelli Jaramillo APRN

## 2018-08-11 ENCOUNTER — APPOINTMENT (OUTPATIENT)
Dept: GENERAL RADIOLOGY | Facility: HOSPITAL | Age: 73
End: 2018-08-11

## 2018-08-11 ENCOUNTER — HOSPITAL ENCOUNTER (INPATIENT)
Facility: HOSPITAL | Age: 73
LOS: 4 days | Discharge: HOME | End: 2018-08-15
Attending: EMERGENCY MEDICINE | Admitting: INTERNAL MEDICINE

## 2018-08-11 ENCOUNTER — APPOINTMENT (OUTPATIENT)
Dept: CT IMAGING | Facility: HOSPITAL | Age: 73
End: 2018-08-11

## 2018-08-11 DIAGNOSIS — R41.82 ALTERED MENTAL STATUS, UNSPECIFIED ALTERED MENTAL STATUS TYPE: ICD-10-CM

## 2018-08-11 DIAGNOSIS — J18.9 PNEUMONIA OF LEFT LOWER LOBE DUE TO INFECTIOUS ORGANISM: ICD-10-CM

## 2018-08-11 DIAGNOSIS — I21.4 NSTEMI (NON-ST ELEVATED MYOCARDIAL INFARCTION) (HCC): Primary | ICD-10-CM

## 2018-08-11 DIAGNOSIS — J96.01 ACUTE RESPIRATORY FAILURE WITH HYPOXIA (HCC): ICD-10-CM

## 2018-08-11 LAB
6-ACETYL MORPHINE: NEGATIVE
A-A DO2: 77.5 MMHG (ref 0–300)
ALBUMIN SERPL-MCNC: 4.2 G/DL (ref 3.4–4.8)
ALBUMIN/GLOB SERPL: 1.4 G/DL (ref 1.5–2.5)
ALP SERPL-CCNC: 60 U/L (ref 35–104)
ALT SERPL W P-5'-P-CCNC: 48 U/L (ref 10–36)
AMPHET+METHAMPHET UR QL: NEGATIVE
ANION GAP SERPL CALCULATED.3IONS-SCNC: 9.1 MMOL/L (ref 3.6–11.2)
APTT PPP: 30.8 SECONDS (ref 23.8–36.1)
ARTERIAL PATENCY WRIST A: ABNORMAL
AST SERPL-CCNC: 72 U/L (ref 10–30)
ATMOSPHERIC PRESS: 726 MMHG
BACTERIA UR QL AUTO: ABNORMAL /HPF
BARBITURATES UR QL SCN: NEGATIVE
BASE EXCESS BLDA CALC-SCNC: -0.4 MMOL/L
BASOPHILS # BLD AUTO: 0.01 10*3/MM3 (ref 0–0.3)
BASOPHILS NFR BLD AUTO: 0.1 % (ref 0–2)
BDY SITE: ABNORMAL
BENZODIAZ UR QL SCN: POSITIVE
BILIRUB SERPL-MCNC: 1 MG/DL (ref 0.2–1.8)
BILIRUB UR QL STRIP: ABNORMAL
BODY TEMPERATURE: 98.6 C
BUN BLD-MCNC: 20 MG/DL (ref 7–21)
BUN/CREAT SERPL: 20.6 (ref 7–25)
BUPRENORPHINE SERPL-MCNC: NEGATIVE NG/ML
CALCIUM SPEC-SCNC: 9.5 MG/DL (ref 7.7–10)
CANNABINOIDS SERPL QL: NEGATIVE
CHLORIDE SERPL-SCNC: 104 MMOL/L (ref 99–112)
CHOLEST SERPL-MCNC: 158 MG/DL (ref 0–200)
CLARITY UR: ABNORMAL
CO2 SERPL-SCNC: 26.9 MMOL/L (ref 24.3–31.9)
COCAINE UR QL: NEGATIVE
COHGB MFR BLD: 2.6 % (ref 0–5)
COLOR UR: ABNORMAL
CREAT BLD-MCNC: 0.97 MG/DL (ref 0.43–1.29)
CRP SERPL-MCNC: 1.1 MG/DL (ref 0–0.99)
DEPRECATED RDW RBC AUTO: 44.5 FL (ref 37–54)
EOSINOPHIL # BLD AUTO: 0.01 10*3/MM3 (ref 0–0.7)
EOSINOPHIL NFR BLD AUTO: 0.1 % (ref 0–7)
ERYTHROCYTE [DISTWIDTH] IN BLOOD BY AUTOMATED COUNT: 13.6 % (ref 11.5–14.5)
GFR SERPL CREATININE-BSD FRML MDRD: 56 ML/MIN/1.73
GLOBULIN UR ELPH-MCNC: 3 GM/DL
GLUCOSE BLD-MCNC: 134 MG/DL (ref 70–110)
GLUCOSE BLDC GLUCOMTR-MCNC: 191 MG/DL (ref 70–130)
GLUCOSE UR STRIP-MCNC: NEGATIVE MG/DL
HCO3 BLDA-SCNC: 25.5 MMOL/L (ref 22–26)
HCT VFR BLD AUTO: 38.5 % (ref 37–47)
HCT VFR BLD CALC: 41 % (ref 37–47)
HDLC SERPL-MCNC: 36 MG/DL (ref 60–100)
HGB BLD-MCNC: 13.2 G/DL (ref 12–16)
HGB BLDA-MCNC: 13.8 G/DL (ref 12–16)
HGB UR QL STRIP.AUTO: NEGATIVE
HOROWITZ INDEX BLD+IHG-RTO: 28 %
HYALINE CASTS UR QL AUTO: ABNORMAL /LPF
IMM GRANULOCYTES # BLD: 0.02 10*3/MM3 (ref 0–0.03)
IMM GRANULOCYTES NFR BLD: 0.2 % (ref 0–0.5)
INR PPP: 1.29 (ref 0.9–1.1)
KETONES UR QL STRIP: NEGATIVE
LDLC SERPL CALC-MCNC: 101 MG/DL (ref 0–100)
LDLC/HDLC SERPL: 2.82 {RATIO}
LEUKOCYTE ESTERASE UR QL STRIP.AUTO: NEGATIVE
LYMPHOCYTES # BLD AUTO: 1.94 10*3/MM3 (ref 1–3)
LYMPHOCYTES NFR BLD AUTO: 21 % (ref 16–46)
MCH RBC QN AUTO: 31.4 PG (ref 27–33)
MCHC RBC AUTO-ENTMCNC: 34.3 G/DL (ref 33–37)
MCV RBC AUTO: 91.7 FL (ref 80–94)
METHADONE UR QL SCN: NEGATIVE
METHGB BLD QL: 0.5 % (ref 0–3)
MODALITY: ABNORMAL
MONOCYTES # BLD AUTO: 0.67 10*3/MM3 (ref 0.1–0.9)
MONOCYTES NFR BLD AUTO: 7.3 % (ref 0–12)
NEUTROPHILS # BLD AUTO: 6.57 10*3/MM3 (ref 1.4–6.5)
NEUTROPHILS NFR BLD AUTO: 71.3 % (ref 40–75)
NITRITE UR QL STRIP: NEGATIVE
OPIATES UR QL: POSITIVE
OSMOLALITY SERPL CALC.SUM OF ELEC: 284 MOSM/KG (ref 273–305)
OXYCODONE UR QL SCN: NEGATIVE
OXYHGB MFR BLDV: 86.9 % (ref 85–100)
PCO2 BLDA: 46.6 MM HG (ref 35–45)
PCP UR QL SCN: NEGATIVE
PH BLDA: 7.36 PH UNITS (ref 7.35–7.45)
PH UR STRIP.AUTO: 5.5 [PH] (ref 5–8)
PLATELET # BLD AUTO: 147 10*3/MM3 (ref 130–400)
PMV BLD AUTO: 11.1 FL (ref 6–10)
PO2 BLDA: 57.6 MM HG (ref 80–100)
POTASSIUM BLD-SCNC: 3.7 MMOL/L (ref 3.5–5.3)
PROT SERPL-MCNC: 7.2 G/DL (ref 6–8)
PROT UR QL STRIP: ABNORMAL
PROTHROMBIN TIME: 16.3 SECONDS (ref 11–15.4)
RBC # BLD AUTO: 4.2 10*6/MM3 (ref 4.2–5.4)
RBC # UR: ABNORMAL /HPF
REF LAB TEST METHOD: ABNORMAL
SAO2 % BLDCOA: 89.7 % (ref 90–100)
SODIUM BLD-SCNC: 140 MMOL/L (ref 135–153)
SP GR UR STRIP: 1.03 (ref 1–1.03)
SQUAMOUS #/AREA URNS HPF: ABNORMAL /HPF
TRIGL SERPL-MCNC: 103 MG/DL (ref 0–150)
TROPONIN I SERPL-MCNC: 1.79 NG/ML
TROPONIN I SERPL-MCNC: 2.13 NG/ML
UROBILINOGEN UR QL STRIP: ABNORMAL
VLDLC SERPL-MCNC: 20.6 MG/DL
WBC NRBC COR # BLD: 9.22 10*3/MM3 (ref 4.5–12.5)
WBC UR QL AUTO: ABNORMAL /HPF

## 2018-08-11 PROCEDURE — 80307 DRUG TEST PRSMV CHEM ANLYZR: CPT | Performed by: EMERGENCY MEDICINE

## 2018-08-11 PROCEDURE — 86140 C-REACTIVE PROTEIN: CPT | Performed by: INTERNAL MEDICINE

## 2018-08-11 PROCEDURE — 82805 BLOOD GASES W/O2 SATURATION: CPT | Performed by: EMERGENCY MEDICINE

## 2018-08-11 PROCEDURE — 71045 X-RAY EXAM CHEST 1 VIEW: CPT

## 2018-08-11 PROCEDURE — 93005 ELECTROCARDIOGRAM TRACING: CPT | Performed by: EMERGENCY MEDICINE

## 2018-08-11 PROCEDURE — 93010 ELECTROCARDIOGRAM REPORT: CPT | Performed by: INTERNAL MEDICINE

## 2018-08-11 PROCEDURE — 80061 LIPID PANEL: CPT | Performed by: EMERGENCY MEDICINE

## 2018-08-11 PROCEDURE — 84484 ASSAY OF TROPONIN QUANT: CPT | Performed by: EMERGENCY MEDICINE

## 2018-08-11 PROCEDURE — 85730 THROMBOPLASTIN TIME PARTIAL: CPT | Performed by: EMERGENCY MEDICINE

## 2018-08-11 PROCEDURE — 70450 CT HEAD/BRAIN W/O DYE: CPT

## 2018-08-11 PROCEDURE — 36415 COLL VENOUS BLD VENIPUNCTURE: CPT

## 2018-08-11 PROCEDURE — 85610 PROTHROMBIN TIME: CPT | Performed by: EMERGENCY MEDICINE

## 2018-08-11 PROCEDURE — 80053 COMPREHEN METABOLIC PANEL: CPT | Performed by: EMERGENCY MEDICINE

## 2018-08-11 PROCEDURE — P9612 CATHETERIZE FOR URINE SPEC: HCPCS

## 2018-08-11 PROCEDURE — 83050 HGB METHEMOGLOBIN QUAN: CPT | Performed by: EMERGENCY MEDICINE

## 2018-08-11 PROCEDURE — 85025 COMPLETE CBC W/AUTO DIFF WBC: CPT | Performed by: EMERGENCY MEDICINE

## 2018-08-11 PROCEDURE — 71045 X-RAY EXAM CHEST 1 VIEW: CPT | Performed by: RADIOLOGY

## 2018-08-11 PROCEDURE — 99223 1ST HOSP IP/OBS HIGH 75: CPT | Performed by: INTERNAL MEDICINE

## 2018-08-11 PROCEDURE — 82962 GLUCOSE BLOOD TEST: CPT

## 2018-08-11 PROCEDURE — 63710000001 INSULIN ASPART PER 5 UNITS: Performed by: INTERNAL MEDICINE

## 2018-08-11 PROCEDURE — 84484 ASSAY OF TROPONIN QUANT: CPT | Performed by: INTERNAL MEDICINE

## 2018-08-11 PROCEDURE — 25010000002 CEFTRIAXONE: Performed by: EMERGENCY MEDICINE

## 2018-08-11 PROCEDURE — 25010000002 AZITHROMYCIN: Performed by: EMERGENCY MEDICINE

## 2018-08-11 PROCEDURE — 36600 WITHDRAWAL OF ARTERIAL BLOOD: CPT | Performed by: EMERGENCY MEDICINE

## 2018-08-11 PROCEDURE — 81001 URINALYSIS AUTO W/SCOPE: CPT | Performed by: EMERGENCY MEDICINE

## 2018-08-11 PROCEDURE — 99285 EMERGENCY DEPT VISIT HI MDM: CPT

## 2018-08-11 PROCEDURE — 84145 PROCALCITONIN (PCT): CPT | Performed by: INTERNAL MEDICINE

## 2018-08-11 PROCEDURE — 87040 BLOOD CULTURE FOR BACTERIA: CPT | Performed by: EMERGENCY MEDICINE

## 2018-08-11 PROCEDURE — 82375 ASSAY CARBOXYHB QUANT: CPT | Performed by: EMERGENCY MEDICINE

## 2018-08-11 PROCEDURE — 25010000002 HEPARIN (PORCINE) PER 1000 UNITS: Performed by: EMERGENCY MEDICINE

## 2018-08-11 PROCEDURE — 70450 CT HEAD/BRAIN W/O DYE: CPT | Performed by: RADIOLOGY

## 2018-08-11 RX ORDER — METOPROLOL TARTRATE 50 MG/1
50 TABLET, FILM COATED ORAL EVERY 12 HOURS SCHEDULED
Status: DISCONTINUED | OUTPATIENT
Start: 2018-08-11 | End: 2018-08-14 | Stop reason: SDUPTHER

## 2018-08-11 RX ORDER — DIAZEPAM 5 MG/1
2.5 TABLET ORAL EVERY 12 HOURS SCHEDULED
Status: DISCONTINUED | OUTPATIENT
Start: 2018-08-11 | End: 2018-08-15 | Stop reason: HOSPADM

## 2018-08-11 RX ORDER — SODIUM CHLORIDE 0.9 % (FLUSH) 0.9 %
10 SYRINGE (ML) INJECTION AS NEEDED
Status: DISCONTINUED | OUTPATIENT
Start: 2018-08-11 | End: 2018-08-15 | Stop reason: HOSPADM

## 2018-08-11 RX ORDER — DILTIAZEM HYDROCHLORIDE 5 MG/ML
20 INJECTION INTRAVENOUS ONCE
Status: DISCONTINUED | OUTPATIENT
Start: 2018-08-11 | End: 2018-08-15 | Stop reason: HOSPADM

## 2018-08-11 RX ORDER — ATORVASTATIN CALCIUM 20 MG/1
20 TABLET, FILM COATED ORAL NIGHTLY
Status: DISCONTINUED | OUTPATIENT
Start: 2018-08-11 | End: 2018-08-12

## 2018-08-11 RX ORDER — CLOPIDOGREL BISULFATE 75 MG/1
75 TABLET ORAL DAILY
Status: DISCONTINUED | OUTPATIENT
Start: 2018-08-12 | End: 2018-08-15 | Stop reason: HOSPADM

## 2018-08-11 RX ORDER — ASPIRIN 81 MG/1
81 TABLET ORAL DAILY
Status: DISCONTINUED | OUTPATIENT
Start: 2018-08-11 | End: 2018-08-11

## 2018-08-11 RX ORDER — CLOPIDOGREL BISULFATE 75 MG/1
300 TABLET ORAL ONCE
Status: COMPLETED | OUTPATIENT
Start: 2018-08-11 | End: 2018-08-11

## 2018-08-11 RX ORDER — DIAZEPAM 5 MG/1
5 TABLET ORAL 2 TIMES DAILY PRN
COMMUNITY

## 2018-08-11 RX ORDER — HEPARIN SODIUM 5000 [USP'U]/ML
5000 INJECTION, SOLUTION INTRAVENOUS; SUBCUTANEOUS AS NEEDED
Status: DISCONTINUED | OUTPATIENT
Start: 2018-08-11 | End: 2018-08-13

## 2018-08-11 RX ORDER — NICOTINE POLACRILEX 4 MG
15 LOZENGE BUCCAL
Status: DISCONTINUED | OUTPATIENT
Start: 2018-08-11 | End: 2018-08-15 | Stop reason: HOSPADM

## 2018-08-11 RX ORDER — ASPIRIN 81 MG/1
324 TABLET, CHEWABLE ORAL ONCE
Status: COMPLETED | OUTPATIENT
Start: 2018-08-11 | End: 2018-08-11

## 2018-08-11 RX ORDER — ASPIRIN 81 MG/1
81 TABLET ORAL DAILY
Status: DISCONTINUED | OUTPATIENT
Start: 2018-08-12 | End: 2018-08-14

## 2018-08-11 RX ORDER — DEXTROSE MONOHYDRATE 25 G/50ML
25 INJECTION, SOLUTION INTRAVENOUS
Status: DISCONTINUED | OUTPATIENT
Start: 2018-08-11 | End: 2018-08-15 | Stop reason: HOSPADM

## 2018-08-11 RX ORDER — HYDROCODONE BITARTRATE AND ACETAMINOPHEN 5; 325 MG/1; MG/1
1 TABLET ORAL EVERY 6 HOURS PRN
Status: DISCONTINUED | OUTPATIENT
Start: 2018-08-11 | End: 2018-08-15 | Stop reason: HOSPADM

## 2018-08-11 RX ORDER — AMLODIPINE BESYLATE 5 MG/1
5 TABLET ORAL DAILY
Status: DISCONTINUED | OUTPATIENT
Start: 2018-08-11 | End: 2018-08-11

## 2018-08-11 RX ADMIN — DIAZEPAM 2.5 MG: 5 TABLET ORAL at 21:29

## 2018-08-11 RX ADMIN — AZITHROMYCIN MONOHYDRATE 500 MG: 500 INJECTION, POWDER, LYOPHILIZED, FOR SOLUTION INTRAVENOUS at 17:46

## 2018-08-11 RX ADMIN — INSULIN ASPART 2 UNITS: 100 INJECTION, SOLUTION INTRAVENOUS; SUBCUTANEOUS at 21:29

## 2018-08-11 RX ADMIN — CEFTRIAXONE 1 G: 1 INJECTION, POWDER, FOR SOLUTION INTRAMUSCULAR; INTRAVENOUS at 16:55

## 2018-08-11 RX ADMIN — ASPIRIN 324 MG: 81 TABLET, CHEWABLE ORAL at 16:55

## 2018-08-11 RX ADMIN — HEPARIN SODIUM 11.3 UNITS/KG/HR: 10000 INJECTION, SOLUTION INTRAVENOUS at 16:55

## 2018-08-11 RX ADMIN — ATORVASTATIN CALCIUM 20 MG: 20 TABLET, FILM COATED ORAL at 21:29

## 2018-08-11 RX ADMIN — METOPROLOL TARTRATE 50 MG: 50 TABLET, FILM COATED ORAL at 21:29

## 2018-08-11 RX ADMIN — CLOPIDOGREL 300 MG: 75 TABLET, FILM COATED ORAL at 16:55

## 2018-08-11 NOTE — ED PROVIDER NOTES
"Subjective   73-year-old white female here for confusion.  Patient's daughter states that 2 days ago the patient had a cyst removed from her back under general anesthesia at [facility].  Yesterday the patient was mildly confused.  Today the patient was much more confused and \"talking out of her head\".  She did not appear short of breath, did not complain of chest or abdominal pain, did not appear diaphoretic or in pain.  The daughter additionally says that during his preop workup, his EKG showed atrial fibrillation which was a new finding for him.  He was referred to his cardiologist.  Daughter reports the patient had similar symptoms in the past and was transferred to .  She was told at that time of patient's symptoms were due to hypokalemia.  No other history of present illness is available from the patient.            Review of Systems   Unable to perform ROS: Mental status change       Past Medical History:   Diagnosis Date   • CAD (coronary artery disease), native coronary artery 12/3/2016   • Centrilobular emphysema (CMS/HCC) 12/8/2016   • Diabetes (CMS/[facility])    • Hypercholesteremia    • Hypertension    • Osteoporosis        No Known Allergies    Past Surgical History:   Procedure Laterality Date   • CARDIAC CATHETERIZATION N/A 12/1/2016    Procedure: Left Heart Cath;  Surgeon: Nate Butler MD;  Location:  [facility] CATH [facility];  Service:    • CARDIAC CATHETERIZATION N/A 12/5/2016    Procedure: Left Heart Cath;  Surgeon: Alyssa Montalvo MD;  Location:  [facility];  Service:    • CARDIAC CATHETERIZATION N/A 1/11/2017    Procedure: Left Heart Cath/STENT RCA;  Surgeon: Nate Butler MD;  Location:  [facility]ATH [facility];  Service:    • CATARACT EXTRACTION     • CHOLECYSTECTOMY     • CYST REMOVAL         Family History   Problem Relation Age of Onset   • Hypertension Mother    • Heart disease Mother    • Diabetes Mother    • Hypertension Father    • Heart disease Father " "   • Diabetes Father    • Cancer Sister    • Heart disease Sister    • Hypertension Sister        Social History     Social History   • Marital status:      Social History Main Topics   • Smoking status: Current Some Day Smoker     Packs/day: 1.00     Years: 10.00     Types: Cigarettes     Last attempt to quit: 12/12/2016   • Smokeless tobacco: Never Used   • Alcohol use No   • Drug use: No   • Sexual activity: Defer     Other Topics Concern   • Not on file           Objective   Physical Exam   Constitutional: She appears well-developed and well-nourished.   HENT:   Head: Normocephalic and atraumatic.   Cardiovascular: Normal heart sounds.  An irregularly irregular rhythm present. Tachycardia present.    Pulmonary/Chest: Effort normal and breath sounds normal. No respiratory distress. She has no wheezes. She has no rales.   Abdominal: Soft. Bowel sounds are normal. She exhibits no distension. There is no tenderness.   Musculoskeletal: Normal range of motion. She exhibits no edema.   Neurological: She is alert. She has normal strength. No cranial nerve deficit. GCS eye subscore is 3. GCS verbal subscore is 4. GCS motor subscore is 6.   Alert.  Oriented to person and partially to place.  She knows that she is in the hospital.  She does not know which hospital, nor does she know the date.  When asked about either of these the patient repeats \"19… 19… [birth year]… 1945\".   Skin: Skin is warm and dry.        3 cm surgical incision closed that either hand with suture and left open in the middle.  No significant erythema.  Only serosanguineous drainage is noted.  No purulent drainage.  This is mildly tender.   Psychiatric: She has a normal mood and affect.   Nursing note and vitals reviewed.      Procedures       Results for orders placed or performed during the hospital encounter of 08/11/18   Comprehensive Metabolic Panel   Result Value Ref Range    Glucose 134 (H) 70 - 110 mg/dL    BUN 20 7 - 21 mg/dL    Creatinine 0.97 " 0.43 - 1.29 mg/dL    Sodium 140 135 - 153 mmol/L    Potassium 3.7 3.5 - 5.3 mmol/L    Chloride 104 99 - 112 mmol/L    CO2 26.9 24.3 - 31.9 mmol/L    Calcium 9.5 7.7 - 10.0 mg/dL    Total Protein 7.2 6.0 - 8.0 g/dL    Albumin 4.20 3.40 - 4.80 g/dL    ALT (SGPT) 48 (H) 10 - 36 U/L    AST (SGOT) 72 (H) 10 - 30 U/L    Alkaline Phosphatase 60 35 - 104 U/L    Total Bilirubin 1.0 0.2 - 1.8 mg/dL    eGFR Non African Amer 56 (L) >60 mL/min/1.73    Globulin 3.0 gm/dL    A/G Ratio 1.4 (L) 1.5 - 2.5 g/dL    BUN/Creatinine Ratio 20.6 7.0 - 25.0    Anion Gap 9.1 3.6 - 11.2 mmol/L   Urinalysis With Culture If Indicated - Urine, Catheter   Result Value Ref Range    Color, UA Dark Yellow (A) Yellow, Straw    Appearance, UA Cloudy (A) Clear    pH, UA 5.5 5.0 - 8.0    Specific Gravity, UA 1.027 1.005 - 1.030    Glucose, UA Negative Negative    Ketones, UA Negative Negative    Bilirubin, UA Small (1+) (A) Negative    Blood, UA Negative Negative    Protein, UA 30 mg/dL (1+) (A) Negative    Leuk Esterase, UA Negative Negative    Nitrite, UA Negative Negative    Urobilinogen, UA 0.2 E.U./dL 0.2 - 1.0 E.U./dL   Blood Gas, Arterial   Result Value Ref Range    Site Arterial: left brachial     Alphonse's Test N/A     pH, Arterial 7.356 7.350 - 7.450 pH units    pCO2, Arterial 46.6 (H) 35.0 - 45.0 mm Hg    pO2, Arterial 57.6 (L) 80.0 - 100.0 mm Hg    HCO3, Arterial 25.5 22.0 - 26.0 mmol/L    Base Excess, Arterial -0.4 mmol/L    O2 Saturation, Arterial 89.7 (L) 90.0 - 100.0 %    Hemoglobin, Blood Gas 13.8 12 - 16 g/dL    Hematocrit, Blood Gas 41.0 37.0 - 47.0 %    Oxyhemoglobin 86.9 85 - 100 %    Methemoglobin 0.50 0.00 - 3.00 %    Carboxyhemoglobin 2.6 0 - 5 %    A-a Gradiant 77.5 0.0 - 300.0 mmHg    Temperature 98.6 C    Barometric Pressure for Blood Gas 726 mmHg    Modality Room Air     FIO2 28 %   Troponin   Result Value Ref Range    Troponin I 1.786 (C) <=0.040 ng/mL   Urine Drug Screen - Urine, Clean Catch   Result Value Ref Range     Amphetamine Screen, Urine Negative Negative    Barbiturates Screen, Urine Negative Negative    Benzodiazepine Screen, Urine Positive (A) Negative    Cocaine Screen, Urine Negative Negative    Methadone Screen, Urine Negative Negative    Opiate Screen Positive (A) Negative    Phencyclidine (PCP), Urine Negative Negative    THC, Screen, Urine Negative Negative    6-ACETYL MORPHINE Negative Negative    Buprenorphine, Screen, Urine Negative Negative    Oxycodone Screen, Urine Negative Negative   CBC Auto Differential   Result Value Ref Range    WBC 9.22 4.50 - 12.50 10*3/mm3    RBC 4.20 4.20 - 5.40 10*6/mm3    Hemoglobin 13.2 12.0 - 16.0 g/dL    Hematocrit 38.5 37.0 - 47.0 %    MCV 91.7 80.0 - 94.0 fL    MCH 31.4 27.0 - 33.0 pg    MCHC 34.3 33.0 - 37.0 g/dL    RDW 13.6 11.5 - 14.5 %    RDW-SD 44.5 37.0 - 54.0 fl    MPV 11.1 (H) 6.0 - 10.0 fL    Platelets 147 130 - 400 10*3/mm3    Neutrophil % 71.3 40.0 - 75.0 %    Lymphocyte % 21.0 16.0 - 46.0 %    Monocyte % 7.3 0.0 - 12.0 %    Eosinophil % 0.1 0.0 - 7.0 %    Basophil % 0.1 0.0 - 2.0 %    Immature Grans % 0.2 0.0 - 0.5 %    Neutrophils, Absolute 6.57 (H) 1.40 - 6.50 10*3/mm3    Lymphocytes, Absolute 1.94 1.00 - 3.00 10*3/mm3    Monocytes, Absolute 0.67 0.10 - 0.90 10*3/mm3    Eosinophils, Absolute 0.01 0.00 - 0.70 10*3/mm3    Basophils, Absolute 0.01 0.00 - 0.30 10*3/mm3    Immature Grans, Absolute 0.02 0.00 - 0.03 10*3/mm3   Urinalysis, Microscopic Only - Urine, Clean Catch   Result Value Ref Range    RBC, UA 3-5 (A) None Seen, 0-2 /HPF    WBC, UA 0-2 None Seen, 0-2 /HPF    Bacteria, UA None Seen None Seen /HPF    Squamous Epithelial Cells, UA 3-6 (A) None Seen, 0-2 /HPF    Hyaline Casts, UA 7-12 None Seen /LPF    Methodology Manual Light Microscopy    Osmolality, Calculated   Result Value Ref Range    Osmolality Calc 284.0 273.0 - 305.0 mOsm/kg   Lipid Panel   Result Value Ref Range    Total Cholesterol 158 0 - 200 mg/dL    Triglycerides 103 0 - 150 mg/dL    HDL  Cholesterol 36 (L) 60 - 100 mg/dL    LDL Cholesterol  101 (H) 0 - 100 mg/dL    VLDL Cholesterol 20.6 mg/dL    LDL/HDL Ratio 2.82      No results found.      ED Course  ED Course as of Aug 11 1621   Sat Aug 11, 2018   1500 Atrial fibrillation with RVR.  Rate 105.  Q waves in lead III and V1.  Nonspecific ST-T wave changes.  No acute ST elevation or depression ECG 12 Lead [BC]      ED Course User Index  [BC] Edilson Bravo MD                  [facility]  Number of Diagnoses or Management Options  Acute respiratory failure with hypoxia (CMS/HCC):   Altered mental status, unspecified altered mental status type:   NSTEMI (non-ST elevated myocardial infarction) (CMS/HCC):   Pneumonia of left lower lobe due to infectious organism (CMS/HCC):      Amount and/or Complexity of Data Reviewed  Clinical lab tests: reviewed  Tests in the radiology section of CPT®: reviewed  Decide to obtain previous medical records or to obtain history from someone other than the patient: yes    Risk of Complications, Morbidity, and/or Mortality  Presenting problems: high  Diagnostic procedures: high  Management options: high          Final diagnoses:   NSTEMI (non-ST elevated myocardial infarction) (CMS/HCC)   Pneumonia of left lower lobe due to infectious organism (CMS/HCC)   Acute respiratory failure with hypoxia (CMS/HCC)   Altered mental status, unspecified altered mental status type            Edilson Bravo MD  08/11/18 1621

## 2018-08-11 NOTE — PLAN OF CARE
Problem: Fall Risk (Adult)  Goal: Identify Related Risk Factors and Signs and Symptoms  Outcome: Ongoing (interventions implemented as appropriate)    Goal: Absence of Fall  Outcome: Ongoing (interventions implemented as appropriate)      Problem: Patient Care Overview  Goal: Plan of Care Review  Outcome: Ongoing (interventions implemented as appropriate)    Goal: Individualization and Mutuality  Outcome: Ongoing (interventions implemented as appropriate)    Goal: Discharge Needs Assessment  Outcome: Ongoing (interventions implemented as appropriate)    Goal: Interprofessional Rounds/Family Conf  Outcome: Ongoing (interventions implemented as appropriate)

## 2018-08-11 NOTE — H&P
History and physical    Chief complaint: Altered mental status    History of present illness:  The patient is a 73-year-old female who was brought to emergency room by family because of altered mental status.  It's reported that 2 days ago the patient had surgery on her back for a cyst and prior to the procedure she was noted to be in atrial fibrillation with controlled ventricular rate.  Patient's family noticed patient seemed a little confused yesterday but today significantly more so.  The patient was too weak to get out of bed this morning and so patient was brought to emergency room.  The family did not notice any facial drooping, slurring of speech, or focal weakness.    In seeing the patient now she is accompanied by HER-2 daughters and the patient is disoriented to place but is cooperative and she has no complaints.  She denies chest pain.     Workup in the emergency room finds that the patient is still in atrial fibrillation with ventricular rate  fairly well controlled in the low 100 range without therapy.  The patient's troponin was found to be significantly elevated suggestive of non-ST elevation myocardial infarction and patient has been started on a heparin drip.  In addition the patient was found to be hypoxemic on room air with a saturation of 75% which has increased to 92% on 2 L.  Chest x-ray shows left lower lobe infiltrate.  The patient is afebrile.  WBC is 9.2.  CT of the brain without contrast report is pending.  Chest x-ray shows left lower lobe infiltrate to my eye.        Past medical history:  COPD, hypertension, chronic pain syndrome, diabetes mellitus type 2, glaucoma, coronary artery disease    Past surgical history: Coronary artery stenting, surgery on cyst on back 2 days ago    Social history: Smokes an occasional cigarette.  No alcohol use.  Lives with a daughter.    Family history: Diabetes    Review of systems:  Obtained from patient's daughters:  As per history of present illness.   In addition the patient has oxygen at home but does not wear it and has been told by her pulmonologist she does not need to.  Otherwise a 14 system review is negative.    Physical exam:  T 98.2, P103, R 20, /96 room air pulse oximetry 75%, pulse oximetry on 2 L 92%  Gen.:  Elderly female wearing oxygen by nasal cannula.  In no distress  HENT: Normocephalic, atraumatic.  External ears are normal clear canals.  External nose is normal with clear nares.  Oral and throat mucosa normal.  Eyes: Pupils equal round and reactive to light, lids and sclerae normal  Neck: Supple, no adenopathy, no thyromegaly, no bruits  Pulmonary: Lungs with rales at left base, otherwise clear bilaterally, normal respiratory effort  Cardiac: Heart is irregular without murmur, no peripheral edema  Gastrointestinal: Abdomen is obese, soft, nontender, without organomegaly or mass  Lymphatics: No axillary or groin adenopathy  Skin: Warm and dry, no significant lesions other then on upper back approximately 1 cm recent surgical incision with no sign of infection  Neurologic: Alert, follows simple commands fairly well except when attempting finger to nose testing the patient will only touch her nose despite being asked to touch my finger multiple times, cranial nerves III through XII intact,  no focal motor deficit, no focal deficit to light touch, patient oriented only to self sating year as 1940 and she is in [employer] and [facility] is president  Psychiatric: Calm, cooperative  Musculoskeletal: No significant muscle atrophy for age, no joint inflammation    Studies:  As above    Assessment and plan:    Acute encephalopathy: Likely metabolic as no focal deficits on exam.  Await report of CT of brain.  Follow.    Non-ST elevation myocardial infarction: Continue heparin drip and dual antiplatelet therapy.  Consult cardiology.    Atrial fibrillation: New diagnosis.  Duration unknown.  And metoprolol.  Continue heparin drip.  Consult  cardiology.    Left lower lobe pneumonia: Low risk of multidrug resistant organisms.  Continue therapy using Rocephin and doxycycline.    COPD: Bronchodilators as needed.    Chronic hypercapnic respiratory failure    Hypertension: Follow    Diabetes mellitus type II: Controlled.  Hold oral therapy and cover sliding scale insulin for now.    Chronic pain syndrome: Patient on hydrocodone at home    Chronic anxiety: Patient on twice daily Valium at home

## 2018-08-12 LAB
ANION GAP SERPL CALCULATED.3IONS-SCNC: 4 MMOL/L (ref 3.6–11.2)
APTT PPP: 59.4 SECONDS (ref 23.8–36.1)
APTT PPP: 59.5 SECONDS (ref 23.8–36.1)
BASOPHILS # BLD AUTO: 0.02 10*3/MM3 (ref 0–0.3)
BASOPHILS NFR BLD AUTO: 0.2 % (ref 0–2)
BUN BLD-MCNC: 17 MG/DL (ref 7–21)
BUN/CREAT SERPL: 19.3 (ref 7–25)
CALCIUM SPEC-SCNC: 9.1 MG/DL (ref 7.7–10)
CHLORIDE SERPL-SCNC: 107 MMOL/L (ref 99–112)
CK MB SERPL-CCNC: 8.96 NG/ML (ref 0–5)
CK MB SERPL-RTO: 1.2 % (ref 0–3)
CK SERPL-CCNC: 733 U/L (ref 24–173)
CO2 SERPL-SCNC: 28 MMOL/L (ref 24.3–31.9)
CREAT BLD-MCNC: 0.88 MG/DL (ref 0.43–1.29)
DEPRECATED RDW RBC AUTO: 43.2 FL (ref 37–54)
EOSINOPHIL # BLD AUTO: 0.24 10*3/MM3 (ref 0–0.7)
EOSINOPHIL NFR BLD AUTO: 2.4 % (ref 0–7)
ERYTHROCYTE [DISTWIDTH] IN BLOOD BY AUTOMATED COUNT: 13.4 % (ref 11.5–14.5)
GFR SERPL CREATININE-BSD FRML MDRD: 63 ML/MIN/1.73
GLUCOSE BLD-MCNC: 126 MG/DL (ref 70–110)
GLUCOSE BLDC GLUCOMTR-MCNC: 121 MG/DL (ref 70–130)
GLUCOSE BLDC GLUCOMTR-MCNC: 128 MG/DL (ref 70–130)
GLUCOSE BLDC GLUCOMTR-MCNC: 129 MG/DL (ref 70–130)
GLUCOSE BLDC GLUCOMTR-MCNC: 217 MG/DL (ref 70–130)
HCT VFR BLD AUTO: 37.9 % (ref 37–47)
HGB BLD-MCNC: 12.9 G/DL (ref 12–16)
IMM GRANULOCYTES # BLD: 0.03 10*3/MM3 (ref 0–0.03)
IMM GRANULOCYTES NFR BLD: 0.3 % (ref 0–0.5)
LYMPHOCYTES # BLD AUTO: 3.29 10*3/MM3 (ref 1–3)
LYMPHOCYTES NFR BLD AUTO: 32.4 % (ref 16–46)
MCH RBC QN AUTO: 30.9 PG (ref 27–33)
MCHC RBC AUTO-ENTMCNC: 34 G/DL (ref 33–37)
MCV RBC AUTO: 90.7 FL (ref 80–94)
MONOCYTES # BLD AUTO: 0.75 10*3/MM3 (ref 0.1–0.9)
MONOCYTES NFR BLD AUTO: 7.4 % (ref 0–12)
NEUTROPHILS # BLD AUTO: 5.81 10*3/MM3 (ref 1.4–6.5)
NEUTROPHILS NFR BLD AUTO: 57.3 % (ref 40–75)
OSMOLALITY SERPL CALC.SUM OF ELEC: 280.6 MOSM/KG (ref 273–305)
PLATELET # BLD AUTO: 146 10*3/MM3 (ref 130–400)
PMV BLD AUTO: 11.2 FL (ref 6–10)
POTASSIUM BLD-SCNC: 3.4 MMOL/L (ref 3.5–5.3)
RBC # BLD AUTO: 4.18 10*6/MM3 (ref 4.2–5.4)
SODIUM BLD-SCNC: 139 MMOL/L (ref 135–153)
TROPONIN I SERPL-MCNC: 0.9 NG/ML
TROPONIN I SERPL-MCNC: 1.21 NG/ML
TROPONIN I SERPL-MCNC: 1.49 NG/ML
TROPONIN I SERPL-MCNC: 2.39 NG/ML
WBC NRBC COR # BLD: 10.14 10*3/MM3 (ref 4.5–12.5)

## 2018-08-12 PROCEDURE — 94799 UNLISTED PULMONARY SVC/PX: CPT

## 2018-08-12 PROCEDURE — 25010000002 AZITHROMYCIN: Performed by: INTERNAL MEDICINE

## 2018-08-12 PROCEDURE — 25010000002 HEPARIN (PORCINE) PER 1000 UNITS: Performed by: EMERGENCY MEDICINE

## 2018-08-12 PROCEDURE — 85730 THROMBOPLASTIN TIME PARTIAL: CPT | Performed by: INTERNAL MEDICINE

## 2018-08-12 PROCEDURE — 82550 ASSAY OF CK (CPK): CPT | Performed by: INTERNAL MEDICINE

## 2018-08-12 PROCEDURE — 99232 SBSQ HOSP IP/OBS MODERATE 35: CPT | Performed by: INTERNAL MEDICINE

## 2018-08-12 PROCEDURE — 80048 BASIC METABOLIC PNL TOTAL CA: CPT | Performed by: INTERNAL MEDICINE

## 2018-08-12 PROCEDURE — 82553 CREATINE MB FRACTION: CPT | Performed by: INTERNAL MEDICINE

## 2018-08-12 PROCEDURE — 93005 ELECTROCARDIOGRAM TRACING: CPT | Performed by: INTERNAL MEDICINE

## 2018-08-12 PROCEDURE — 99223 1ST HOSP IP/OBS HIGH 75: CPT | Performed by: INTERNAL MEDICINE

## 2018-08-12 PROCEDURE — 84484 ASSAY OF TROPONIN QUANT: CPT | Performed by: INTERNAL MEDICINE

## 2018-08-12 PROCEDURE — 82962 GLUCOSE BLOOD TEST: CPT

## 2018-08-12 PROCEDURE — 25010000002 CEFTRIAXONE: Performed by: INTERNAL MEDICINE

## 2018-08-12 PROCEDURE — 85025 COMPLETE CBC W/AUTO DIFF WBC: CPT | Performed by: INTERNAL MEDICINE

## 2018-08-12 PROCEDURE — 63710000001 INSULIN ASPART PER 5 UNITS: Performed by: INTERNAL MEDICINE

## 2018-08-12 RX ORDER — DIAZEPAM 5 MG/1
5 TABLET ORAL 2 TIMES DAILY PRN
Status: CANCELLED | OUTPATIENT
Start: 2018-08-12

## 2018-08-12 RX ORDER — L.ACID,PARA/B.BIFIDUM/S.THERM 8B CELL
1 CAPSULE ORAL ONCE
Status: COMPLETED | OUTPATIENT
Start: 2018-08-12 | End: 2018-08-12

## 2018-08-12 RX ORDER — L.ACID,PARA/B.BIFIDUM/S.THERM 8B CELL
1 CAPSULE ORAL DAILY
Status: DISCONTINUED | OUTPATIENT
Start: 2018-08-12 | End: 2018-08-12

## 2018-08-12 RX ORDER — L.ACID,PARA/B.BIFIDUM/S.THERM 8B CELL
1 CAPSULE ORAL DAILY
Status: DISCONTINUED | OUTPATIENT
Start: 2018-08-13 | End: 2018-08-15 | Stop reason: HOSPADM

## 2018-08-12 RX ORDER — SENNA AND DOCUSATE SODIUM 50; 8.6 MG/1; MG/1
2 TABLET, FILM COATED ORAL NIGHTLY
Status: DISCONTINUED | OUTPATIENT
Start: 2018-08-12 | End: 2018-08-15 | Stop reason: HOSPADM

## 2018-08-12 RX ORDER — ATORVASTATIN CALCIUM 40 MG/1
40 TABLET, FILM COATED ORAL NIGHTLY
Status: DISCONTINUED | OUTPATIENT
Start: 2018-08-12 | End: 2018-08-15 | Stop reason: HOSPADM

## 2018-08-12 RX ADMIN — DIAZEPAM 2.5 MG: 5 TABLET ORAL at 09:20

## 2018-08-12 RX ADMIN — CEFTRIAXONE 1 G: 1 INJECTION, POWDER, FOR SOLUTION INTRAMUSCULAR; INTRAVENOUS at 17:56

## 2018-08-12 RX ADMIN — AZITHROMYCIN MONOHYDRATE 500 MG: 500 INJECTION, POWDER, LYOPHILIZED, FOR SOLUTION INTRAVENOUS at 15:45

## 2018-08-12 RX ADMIN — METOPROLOL TARTRATE 50 MG: 50 TABLET, FILM COATED ORAL at 09:20

## 2018-08-12 RX ADMIN — HEPARIN SODIUM 11.24 UNITS/KG/HR: 10000 INJECTION, SOLUTION INTRAVENOUS at 13:43

## 2018-08-12 RX ADMIN — DIAZEPAM 2.5 MG: 5 TABLET ORAL at 21:06

## 2018-08-12 RX ADMIN — ATORVASTATIN CALCIUM 40 MG: 40 TABLET, FILM COATED ORAL at 23:14

## 2018-08-12 RX ADMIN — METOPROLOL TARTRATE 50 MG: 50 TABLET, FILM COATED ORAL at 21:05

## 2018-08-12 RX ADMIN — HYDROCODONE BITARTRATE AND ACETAMINOPHEN 1 TABLET: 5; 325 TABLET ORAL at 00:06

## 2018-08-12 RX ADMIN — SENNOSIDES AND DOCUSATE SODIUM 2 TABLET: 8.6; 5 TABLET ORAL at 21:05

## 2018-08-12 RX ADMIN — CLOPIDOGREL 75 MG: 75 TABLET, FILM COATED ORAL at 09:21

## 2018-08-12 RX ADMIN — INSULIN ASPART 4 UNITS: 100 INJECTION, SOLUTION INTRAVENOUS; SUBCUTANEOUS at 21:05

## 2018-08-12 RX ADMIN — Medication 1 CAPSULE: at 09:21

## 2018-08-12 RX ADMIN — ASPIRIN 81 MG: 81 TABLET, DELAYED RELEASE ORAL at 09:20

## 2018-08-12 NOTE — PLAN OF CARE
Problem: Patient Care Overview  Goal: Plan of Care Review   08/11/18 2156   Coping/Psychosocial   Plan of Care Reviewed With patient;family   Plan of Care Review   Progress no change       Problem: Cardiac: ACS (Acute Coronary Syndrome) (Adult)  Goal: Signs and Symptoms of Listed Potential Problems Will be Absent, Minimized or Managed (Cardiac: ACS)   08/11/18 2156   Goal/Outcome Evaluation   Problems Assessed (Acute Coronary Syndrome) all   Problems Present (Acute Coronary Syn) none       Problem: Skin Injury Risk (Adult)  Goal: Identify Related Risk Factors and Signs and Symptoms   08/11/18 2156   Skin Injury Risk (Adult)   Related Risk Factors (Skin Injury Risk) cognitive impairment;critical care admission;mobility impaired;moisture     Goal: Skin Health and Integrity   08/11/18 2156   Skin Injury Risk (Adult)   Skin Health and Integrity making progress toward outcome

## 2018-08-12 NOTE — PROGRESS NOTES
May consider replacing potassium with hypokalemia.      Antibiotics ordered x 2 days.  Please re-order if plan to continue.

## 2018-08-12 NOTE — PLAN OF CARE
Problem: Fall Risk (Adult)  Goal: Identify Related Risk Factors and Signs and Symptoms  Outcome: Ongoing (interventions implemented as appropriate)    Goal: Absence of Fall  Outcome: Ongoing (interventions implemented as appropriate)      Problem: Patient Care Overview  Goal: Individualization and Mutuality  Outcome: Ongoing (interventions implemented as appropriate)    Goal: Discharge Needs Assessment  Outcome: Ongoing (interventions implemented as appropriate)    Goal: Interprofessional Rounds/Family Conf  Outcome: Ongoing (interventions implemented as appropriate)      Problem: Cardiac: ACS (Acute Coronary Syndrome) (Adult)  Goal: Signs and Symptoms of Listed Potential Problems Will be Absent, Minimized or Managed (Cardiac: ACS)  Outcome: Ongoing (interventions implemented as appropriate)      Problem: Skin Injury Risk (Adult)  Goal: Identify Related Risk Factors and Signs and Symptoms  Outcome: Ongoing (interventions implemented as appropriate)    Goal: Skin Health and Integrity  Outcome: Ongoing (interventions implemented as appropriate)

## 2018-08-12 NOTE — PROGRESS NOTES
Chief complaint: Altered mental status    Subjective: Only complaint is being constipated.    Review of Systems   Gen.: No fever or chills  Pulmonary: No shortness of breath or productive cough  Cardiac: No chest pain or peripheral edema  Gastrointestinal: No abdominal pain, nausea, vomiting or diarrhea  Neurologic: No focal numbness or weakness    Temp:  [98.2 °F (36.8 °C)-98.6 °F (37 °C)] 98.6 °F (37 °C)  Heart Rate:  [] 80  Resp:  [14-20] 14  BP: (107-156)/() 151/96  I/O last 3 completed shifts:  In: 220 [P.O.:120; IV Piggyback:100]  Out: 450 [Urine:450]  No intake/output data recorded.    Physical Exam  Gen.: In no distress.  Pulmonary: Lungs with few crackles at left base, otherwise clear bilaterally, normal respiratory effort  Cardiac: Heart irregular, no murmur, no edema  Gastrointestinal: Abdomen soft, nontender, no organomegaly  Neurologic: Alert, no focal motor or sensory deficits, now oriented to year and president but still says that she is at      Assessment and plan:     Acute encephalopathy: Improved.  Likely metabolic.  Continue to follow.     Non-ST elevation myocardial infarction: Stable.  Continue heparin drip and dual antiplatelet therapy.  Cardiology consult pending.     Atrial fibrillation: New diagnosis.  Duration unknown.  Ventricular rate controlled.   Continue metoprolol.  Continue heparin drip.  Cardiology consult pending.     Left lower lobe pneumonia: Stable.  Day #2 of empiric Rocephin and doxycycline.     COPD:  continue albuterol as needed.     Chronic hypercapnic respiratory failure     Hypertension: Follow     Diabetes mellitus type II: Controlled.  Hold oral therapy and cover sliding scale insulin for now.     Chronic pain syndrome: Patient on hydrocodone as at home     Chronic anxiety: Patient on twice daily Valium as at home

## 2018-08-12 NOTE — CONSULTS
Consults  Date of Admit: 8/11/2018  Date of Consult: 08/12/18  No ref. provider found  Emily Roberto  1945  Consulting Physician: Dr. Rudy Madsen MD, [facility]    Cardiology consultation    Reason for consultation: Troponin elevation  Assessment:  1. Acute and NSTEMI initially trending up however now trending down  2. History of known ASCVD status post stenting of the LAD, RCA, and posterior descending artery.  3. Essential hypertension, controlled  4. Dyslipidemia  5. Diabetes mellitus type 2  6. Tobacco abuse  7. Acute delirium  8. Left lower lobe pneumonia      Recommendations:  1. Continue with aspirin, Plavix, metoprolol and IV heparin per protocol.  2. Increase atorvastatin to 40 mg daily since her LDL is still higher than desirable range.  3. Continue to follow the troponin trend and the crit continues to decrease then make consider further evaluation with a Lexiscan sestamibi study.  4. If she has recurrent anginal symptoms or increasing troponin ,then may have to consider cardiac catheterization.  5. We will keep her nothing by mouth after midnight and make the final decision in a.m.  I've discussed this with Mrs. Roberto and she is agreeable with this plan.  6. Will reevaluate her LV wall motion and systolic function with an echo Doppler study.      History of Present Illness    Subjective     Chief Complaint   Patient presents with   • Shortness of Breath       Emily Roberto is a 73 y.o. female with past medical history significant for known ASCVD status post stenting of the mid LAD with placement of overlapping 2.25 x 28 and 2.5 x 18 mm BENTLEY on 12/5/2016 and also stent in the ostium of the right coronary artery 30% residual stenosis after stenting and the posterior descending artery with 0 residual stenosis.  Patient also has a past medical history significant for diabetes mellitus type 2, essential hypertension, dyslipidemia, with occasional tobacco abuse, and COPD.    During my evaluation  most questions were answered by the daughter that was at bedside the patient was still somewhat confused although it seems that her confusion has improved.   The patient was able to state that she had chest pains earlier today those described as an ache and she rated the pain as 7 out of 10 on the pain scale and is unable to guess how long the pain lasted.  She does not believe she had any associated symptoms either.    The daughter states that she brought her to the ER yesterday due to confusion and weakness.  She does state that the patient recently had a procedure on her back where she had a cyst removed at which time she was noted to be in atrial fibrillation with a controlled rate of about a 100. The daughter also states that the patient has been symptom-free in terms of no chest pain, shortness of breath, palpitations or syncope.  She was stating she was so weak to the point where she couldn't get out of bed and walk to the bathroom.  The daughter also states that when her atrial fibrillation was diagnosed while having the procedure done that this was new to her and has never been told she has atrial fibrillation before.   Upon arrival in the emergency department her initial troponin came back as 1.786 trended up to a peak of 2.390 on 8/12/2018 at 12:47 AM with the most recent troponin being 1.491 on 8/12/2018 at 7:08 AM.  Chest x-ray showed cardiomegaly and left lower lobe airspace disease and small left effusion per radiology.  Patient also had a CT of the head performed that showed senescent changes. No CT evidence of acute intracranial  abnormality.  Per radiology.      Cardiac risk factors:arteriosclerotic heart disease, diabetes mellitus, hypercholesterolemia, hypertension, smoking, Sedentary life style and Obesity    [facility]   Echocardiogram   Order# [id number]   Reading physician: Kun Saxena MD Ordering physician: Shelli Jaramillo APRN Study date: 3/27/18   Patient Information      Patient Name  Emily Roberto MRN  [id number] Sex  Female  (Age)  1945 (73 y.o.)   Sedation Narrator Report     Interpretation Summary     · Left ventricular systolic function is normal. Estimated EF appears to be in the range of 61 - 65%. Normal left ventricular cavity size and wall thickness noted. Septal wall motion is abnormal.       Cardiac catheterization and coronary intervention on 2017:  Indications     Coronary artery disease of native artery of native heart with stable angina pectoris (CMS/[facility]) [I25.118 (ICD-10-CM)]       Conclusion        · Right dominant coronary artery system with patent stents in the LAD.  · Successful angioplasty and stenting of the right coronary artery.     Final impression:  Right dominant coronary artery system with single vessel disease involving the right coronary artery  Prior sites of stenting in the LAD are widely patent without in-stent restenosis or thrombosis  Site of angioplasty in the inferior limb of the branching diagonal vessel is patent without significant restenosis  Successful angioplasty and stenting of the ostium of the right coronary artery and the posterior descending artery.     Procedures performed:  Coronary angiography  Angioplasty and stenting of the right coronary artery  Anteroposterior stenting of the posterior descending artery.     History of present illness:  Mrs. Roberto is a pleasant woman who recently presented to the hospital with unstable angina.  She had undergone cardiac catheterization which demonstrated multivessel disease.  She had been referred to [facility] for bypass surgery however she was not felt to be an ideal surgical candidate.  As such she underwent percutaneous revascularization of the LAD which was complex.  The patient was scheduled for a staged intervention to the right coronary artery.  Prior to the procedure she was given informed consent apprising her of the risk of heart attack, stroke and  death.  She understood these risks and agreed to proceed.      Findings in detail:  Left Main coronary artery:  The left main coronary artery is a large vessel which trifurcates into the LAD and ramus intermedius branch and circumflex artery.  Left main coronary artery is free of atherosclerotic disease.     Left anterior descending artery:  The left anterior descending artery is a large vessel which reaches the apex the ventricle and gives rise to one large bifurcating diagonal branch.  There is a 40% lesion noted in the very proximal portion of the LAD.  The previously implanted stents in the mid segment of the LAD are widely patent without evidence of in-stent restenosis and VICKY-3 flow in the distal vascular bed.  The inferior limb of the branching diagonal vessel is widely patent at the site of prior angioplasty.     Ramus intermedius branch:  The ramus intermediate branch is a relatively small vessel which is free of atherosclerotic disease.     Circumflex artery:  The circumflex artery is a moderate-sized vessel which gives rise to one small obtuse marginal vessel.     Right coronary artery:  The right coronary artery is a large vessel which gives rise to the posterior descending artery is a very large posterior lateral branch.  Prior to intervention there is a severe calcified eccentric 90% ostial RCA lesion.  There is VICKY-3 flow in the distal vascular bed.  This would be considered to be the culprit lesion.  The vessel diameter is 4.5 mm.  The lesion length is 15 mm.  This is a type B lesion due to its ostial location and evidence of calcification.  Postintervention there is a 30% residual stenosis.  The lesion length is a proximally 15 mm.  The posterior descending artery is noted to have an 80% lesion in its proximal segment.  The vessel diameter is 2.5 mm.  The lesion length is approximately 20 mm.  Postintervention there is a 0% residual stenosis.  There is VICKY-3 flow pre-and postintervention through  this lesion.  This is a type A lesion.     Final impression and plan:  Overall it is my impression that Mrs. Roberto is undergone successful percutaneous revascularization of the right coronary artery.  She will undergo risk factor modification as appropriate.            Past Medical History:   Diagnosis Date   • CAD (coronary artery disease), native coronary artery 12/3/2016   • Centrilobular emphysema (CMS/HCC) 12/8/2016   • Diabetes (CMS/[facility])    • Hypercholesteremia    • Hypertension    • Osteoporosis      Past Surgical History:   Procedure Laterality Date   • CARDIAC CATHETERIZATION N/A 12/1/2016    Procedure: Left Heart Cath;  Surgeon: Nate Butler MD;  Location:  [facility] CATH INVASIVE LOCATION;  Service:    • CARDIAC CATHETERIZATION N/A 12/5/2016    Procedure: Left Heart Cath;  Surgeon: Alyssa Montalvo MD;  Location:  [facility];  Service:    • CARDIAC CATHETERIZATION N/A 1/11/2017    Procedure: Left Heart Cath/STENT RCA;  Surgeon: Nate Butler MD;  Location:  [facility] CATH INV[facility]IVE LOC[facility];  Service:    • CATARACT EXTRACTION     • CHOLECYSTECTOMY     • CYST REMOVAL       Family History   Problem Relation Age of Onset   • Hypertension Mother    • Heart disease Mother    • Diabetes Mother    • Hypertension Father    • Heart disease Father    • Diabetes Father    • Cancer Sister    • Heart disease Sister    • Hypertension Sister      Social History   Substance Use Topics   • Smoking status: Current Some Day Smoker     Packs/day: 1.00     Years: 10.00     Types: Cigarettes     Last attempt to quit: 12/12/2016   • Smokeless tobacco: Never Used   • Alcohol use No     Prescriptions Prior to Admission   Medication Sig Dispense Refill Last Dose   • albuterol (PROAIR HFA) 108 (90 BASE) MCG/ACT inhaler Inhale 2 puffs Every 4 (Four) Hours As Needed for Wheezing. 1 inhaler 6 Past Week at Unknown time   • amLODIPine (NORVASC) 5 MG tablet Take 5 mg by mouth Daily.   8/10/2018 at am   • aspirin 81 MG EC  tablet Take 1 tablet by mouth Daily. 30 tablet 11 8/10/2018 at am   • atorvastatin (LIPITOR) 20 MG tablet Take 1 tablet by mouth Every Night. 30 tablet 11 8/10/2018 at pm   • clopidogrel (PLAVIX) 75 MG tablet Take 1 tablet by mouth Daily. 30 tablet 11 8/10/2018 at am   • diazePAM (VALIUM) 5 MG tablet Take 5 mg by mouth 2 (Two) Times a Day As Needed for Anxiety.   8/10/2018 at pm   • gabapentin (NEURONTIN) 800 MG tablet Take 800 mg by mouth 3 (Three) Times a Day.   8/10/2018 at am   • HYDROcodone-acetaminophen (NORCO)  MG per tablet Take 1 tablet by mouth 3 (Three) Times a Day As Needed for Moderate Pain .   8/10/2018 at pm   • isosorbide mononitrate (IMDUR) 30 MG 24 hr tablet Take 1 tablet by mouth Every Morning. (Patient taking differently: Take 20 mg by mouth Every Morning.) 30 tablet 11 8/10/2018 at am   • metFORMIN (GLUCOPHAGE) 500 MG tablet Take 500 mg by mouth 2 (Two) Times a Day With Meals.   8/10/2018 at pm   • metoprolol tartrate (LOPRESSOR) 25 MG tablet Take 1 tablet by mouth 2 (Two) Times a Day. 60 tablet 11 8/10/2018 at pm   • vitamin D (ERGOCALCIFEROL) 93187 UNITS capsule capsule Take 50,000 Units by mouth Every 7 (Seven) Days. Patient takes on friday   Past Week at Unknown time     Allergies:  Patient has no known allergies.      Current Facility-Administered Medications:   •  aspirin EC tablet 81 mg, 81 mg, Oral, Daily, Luis Horton MD, 81 mg at 08/12/18 0920  •  atorvastatin (LIPITOR) tablet 20 mg, 20 mg, Oral, Nightly, Luis Horton MD, 20 mg at 08/11/18 2129  •  AZITHROMYCIN 500 MG/250 ML 0.9% NS IVPB (MBP), 500 mg, Intravenous, Once, Luis Horton MD  •  cefTRIAXone (ROCEPHIN) 1 g/100 mL 0.9% NS (MBP), 1 g, Intravenous, Once, Luis Horton MD  •  clopidogrel (PLAVIX) tablet 75 mg, 75 mg, Oral, Daily, Luis Horton MD, 75 mg at 08/12/18 0921  •  dextrose (D50W) solution 25 g, 25 g, Intravenous, Q15 Min PRN, Luis Horton MD  •  dextrose (GLUTOSE) oral gel 15 g,  15 g, Oral, Q15 Min PRN, Luis Horton MD  •  diazePAM (VALIUM) tablet 2.5 mg, 2.5 mg, Oral, Q12H, Luis Horton MD, 2.5 mg at 08/12/18 0920  •  diltiaZEM (CARDIZEM) injection 20 mg, 20 mg, Intravenous, Once, Edilson Bravo MD  •  glucagon (human recombinant) (GLUCAGEN DIAGNOSTIC) injection 1 mg, 1 mg, Subcutaneous, PRN, Luis Hortno MD  •  heparin (porcine) 5000 UNIT/ML injection 5,000 Units, 5,000 Units, Intravenous, PRN, Edilson Bravo MD  •  heparin 66081 units/250 mL (100 units/mL) in 0.45 % NaCl infusion, 11.3 Units/kg/hr, Intravenous, Titrated, Edilson Bravo MD, Last Rate: 10 mL/hr at 08/11/18 1655, 11.3 Units/kg/hr at 08/11/18 1655  •  HYDROcodone-acetaminophen (NORCO) 5-325 MG per tablet 1 tablet, 1 tablet, Oral, Q6H PRN, Luis Horton MD, 1 tablet at 08/12/18 0006  •  insulin aspart (novoLOG) injection 0-9 Units, 0-9 Units, Subcutaneous, 4x Daily AC & at Bedtime, Luis Horton MD, 2 Units at 08/11/18 2129  •  magnesium hydroxide (MILK OF MAGNESIA) suspension 2400 mg/10mL 10 mL, 10 mL, Oral, Daily PRN, Luis Horton MD  •  metoprolol tartrate (LOPRESSOR) tablet 50 mg, 50 mg, Oral, Q12H, Luis Horton MD, 50 mg at 08/12/18 0920  •  polyethylene glycol 3350 powder (packet), 17 g, Oral, BID PRN, Luis Horton MD  •  sennosides-docusate sodium (SENOKOT-S) 8.6-50 MG tablet 2 tablet, 2 tablet, Oral, Nightly, Luis Horton MD  •  Insert peripheral IV, , , Once **AND** sodium chloride 0.9 % flush 10 mL, 10 mL, Intravenous, PRN, Edilson Bravo MD    Review of Systems   Constitutional: Positive for fatigue. Negative for diaphoresis and fever.   HENT: Negative for congestion and nosebleeds.    Respiratory: Negative for chest tightness and shortness of breath.    Cardiovascular: Negative for chest pain and palpitations.   Gastrointestinal: Negative for abdominal pain and vomiting.   Musculoskeletal: Negative for arthralgias and gait problem.   Skin: Negative for color  change and pallor.   Neurological: Negative for seizures and syncope.   Hematological: Does not bruise/bleed easily.   Psychiatric/Behavioral: Positive for confusion. Negative for agitation.          Objective      Vital Signs  Temp:  [98.2 °F (36.8 °C)-98.6 °F (37 °C)] 98.6 °F (37 °C)  Heart Rate:  [] 73  Resp:  [14-20] 14  BP: (107-156)/() 132/71  Body mass index is 37.29 kg/m².    Intake/Output Summary (Last 24 hours) at 08/12/18 1111  Last data filed at 08/11/18 2303   Gross per 24 hour   Intake              220 ml   Output              450 ml   Net             -230 ml       Physical Exam   Constitutional: She appears well-developed and well-nourished. No distress.   Cardiovascular: Normal rate and normal heart sounds.  An irregular rhythm present.   Pulmonary/Chest: Effort normal. No respiratory distress. She has no wheezes. She has rales (left lower lobe).   Abdominal: Soft. Bowel sounds are normal. There is no guarding.   Musculoskeletal: She exhibits no edema.   Neurological: She is alert.   Patient alert to person but is not alert to time and place   Skin: Skin is warm and dry. She is not diaphoretic.   Psychiatric: She has a normal mood and affect.         Results Review:   I reviewed the patient's new clinical results.    Results from last 7 days  Lab Units 08/12/18  0708 08/12/18  0047 08/11/18  1841 08/11/18  1416   CK TOTAL U/L  --  733*  --   --    TROPONIN I ng/mL 1.491* 2.390* 2.127* 1.786*   CKMB ng/mL  --  8.96*  --   --        Results from last 7 days  Lab Units 08/12/18  0047 08/11/18  1416   WBC 10*3/mm3 10.14 9.22   HEMOGLOBIN g/dL 12.9 13.2   PLATELETS 10*3/mm3 146 147       Results from last 7 days  Lab Units 08/12/18  0047 08/11/18  1416   SODIUM mmol/L 139 140   POTASSIUM mmol/L 3.4* 3.7   CHLORIDE mmol/L 107 104   CO2 mmol/L 28.0 26.9   BUN mg/dL 17 20   CREATININE mg/dL 0.88 0.97   CALCIUM mg/dL 9.1 9.5   GLUCOSE mg/dL 126* 134*   ALT (SGPT) U/L  --  48*   AST (SGOT) U/L  --   72*     Lab Results   Component Value Date    INR 1.29 (H) 08/11/2018    INR 1.06 01/12/2017    INR 0.99 01/11/2017    INR 1.04 12/04/2016    INR 1.04 12/02/2016    INR 1.00 11/30/2016     No results found for: MG  Lab Results   Component Value Date    TSH 0.133 (L) 01/12/2017    TRIG 103 08/11/2018    HDL 36 (L) 08/11/2018     (H) 08/11/2018      Lab Results   Component Value Date    .0 (H) 05/23/2017    .0 (H) 01/12/2017    .0 (H) 12/03/2016       EKG on 8/12/2018      Cardiac catheterization on 12/5/2016  Conclusion   FINAL IMPRESSION:  · Successful PTCA/stenting of the mid LAD with placement of overlapping 2.25 x 28 and 2.5 x 18 mm drug-eluting stents reducing 90% stenosis to 0%.  · Successful balloon and depressed he of the diagonal branch of the LAD reducing 80% stenosis to less than 30%.  · No acute procedure related complications.  RECOMMENDATIONS:  · Continue dual antiplatelet therapy, optimal medical management and risk factor management.  · The patient will be scheduled for staged intervention of the RCA and the PDA in 2-3 weeks.     Cardiac catheterization on 1/11/2017          Imaging Results (last 72 hours)     Procedure Component Value Units Date/Time    CT Head Without Contrast Stroke Protocol [[id number]] Collected:  08/12/18 0958     Updated:  08/12/18 1001    Narrative:       EXAMINATION: CT HEAD WO CONTRAST STROKE PROTOCOL-      CLINICAL INDICATION:     Confusion/delirium, altered LOC, unexplained     COMPARISON:    None     Technique: Multiple CT axial images were obtained through the level of  the brain without IV contrast administration. Reformatted images in the  coronal and/or sagittal plane(s) were generated from the axial data set  to facilitate diagnostic accuracy and/or surgical planning.     Radiation dose reduction techniques were utilized per ALARA protocol.  Automated exposure control was initiated through either or grabHalo or  DoseRight software packages  by  protocol.       DOSE (DLP mGy-cm): 1147.5 mGy.cm     FINDINGS:     Generalized cerebral atrophy and chronic small vessel ischemic disease  noted.  No acute intracranial abnormality.  No midline shift or mass effect.  No hydrocephalus or intracranial hemorrhage.  There is no CT evidence of acute vascular territory infarct.  Bone windows show no acute osseous abnormality.       Impression:       Senescent changes. No CT evidence of acute intracranial  abnormality.     This report was finalized on 8/12/2018 9:58 AM by Dr. Colten Hauser MD.       XR Chest 1 View [[id number]] Collected:  08/12/18 0957     Updated:  08/12/18 1000    Narrative:       EXAMINATION: XR CHEST 1 VW-      CLINICAL INDICATION:     ams     TECHNIQUE:  XR CHEST 1 VW-      COMPARISON: 05/23/2017      FINDINGS:   Decreased lung volumes.  Left basilar airspace disease.   Cardiomegaly noted.   No pneumothorax.   Small left effusion.   Stable appearance of the bony structures.            Impression:       Cardiomegaly and left lower lobe airspace disease with small  left effusion.     This report was finalized on 8/12/2018 9:58 AM by Dr. Colten Hauser MD.              Thank you very much for asking us to be involved in this patient's care.  We will follow along with you.    Galen Turcios PA-C acting as scribe for Dr. Rudy Madsen MD, [facility]  08/12/18  11:11 AM      Dr. Rudy Madsen MD, [facility]   08/12/18  11:11 AM

## 2018-08-13 ENCOUNTER — APPOINTMENT (OUTPATIENT)
Dept: CARDIOLOGY | Facility: HOSPITAL | Age: 73
End: 2018-08-13
Attending: INTERNAL MEDICINE

## 2018-08-13 LAB
ALBUMIN SERPL-MCNC: 3.2 G/DL (ref 3.4–4.8)
ALBUMIN/GLOB SERPL: 1.2 G/DL (ref 1.5–2.5)
ALP SERPL-CCNC: 59 U/L (ref 35–104)
ALT SERPL W P-5'-P-CCNC: 26 U/L (ref 10–36)
ANION GAP SERPL CALCULATED.3IONS-SCNC: 6.3 MMOL/L (ref 3.6–11.2)
APTT PPP: 63.8 SECONDS (ref 23.8–36.1)
AST SERPL-CCNC: 34 U/L (ref 10–30)
BILIRUB SERPL-MCNC: 1 MG/DL (ref 0.2–1.8)
BUN BLD-MCNC: 12 MG/DL (ref 7–21)
BUN/CREAT SERPL: 18.5 (ref 7–25)
CALCIUM SPEC-SCNC: 8.5 MG/DL (ref 7.7–10)
CHLORIDE SERPL-SCNC: 107 MMOL/L (ref 99–112)
CO2 SERPL-SCNC: 26.7 MMOL/L (ref 24.3–31.9)
CREAT BLD-MCNC: 0.65 MG/DL (ref 0.43–1.29)
GFR SERPL CREATININE-BSD FRML MDRD: 90 ML/MIN/1.73
GLOBULIN UR ELPH-MCNC: 2.6 GM/DL
GLUCOSE BLD-MCNC: 100 MG/DL (ref 70–110)
GLUCOSE BLDC GLUCOMTR-MCNC: 107 MG/DL (ref 70–130)
GLUCOSE BLDC GLUCOMTR-MCNC: 140 MG/DL (ref 70–130)
GLUCOSE BLDC GLUCOMTR-MCNC: 95 MG/DL (ref 70–130)
GLUCOSE BLDC GLUCOMTR-MCNC: 97 MG/DL (ref 70–130)
MAGNESIUM SERPL-MCNC: 1.6 MG/DL (ref 1.7–2.6)
OSMOLALITY SERPL CALC.SUM OF ELEC: 279.2 MOSM/KG (ref 273–305)
POTASSIUM BLD-SCNC: 3.1 MMOL/L (ref 3.5–5.3)
PROT SERPL-MCNC: 5.8 G/DL (ref 6–8)
SODIUM BLD-SCNC: 140 MMOL/L (ref 135–153)
TROPONIN I SERPL-MCNC: 0.67 NG/ML

## 2018-08-13 PROCEDURE — 99152 MOD SED SAME PHYS/QHP 5/>YRS: CPT | Performed by: INTERNAL MEDICINE

## 2018-08-13 PROCEDURE — 25010000002 HEPARIN (PORCINE) PER 1000 UNITS: Performed by: INTERNAL MEDICINE

## 2018-08-13 PROCEDURE — C1760 CLOSURE DEV, VASC: HCPCS | Performed by: INTERNAL MEDICINE

## 2018-08-13 PROCEDURE — C1894 INTRO/SHEATH, NON-LASER: HCPCS | Performed by: INTERNAL MEDICINE

## 2018-08-13 PROCEDURE — 25010000002 MIDAZOLAM PER 1 MG: Performed by: INTERNAL MEDICINE

## 2018-08-13 PROCEDURE — C1894 INTRO/SHEATH, NON-LASER: HCPCS

## 2018-08-13 PROCEDURE — 85730 THROMBOPLASTIN TIME PARTIAL: CPT | Performed by: INTERNAL MEDICINE

## 2018-08-13 PROCEDURE — 93454 CORONARY ARTERY ANGIO S&I: CPT | Performed by: INTERNAL MEDICINE

## 2018-08-13 PROCEDURE — 82962 GLUCOSE BLOOD TEST: CPT

## 2018-08-13 PROCEDURE — 84484 ASSAY OF TROPONIN QUANT: CPT | Performed by: INTERNAL MEDICINE

## 2018-08-13 PROCEDURE — 25010000002 MAGNESIUM SULFATE 2 GM/50ML SOLUTION: Performed by: INTERNAL MEDICINE

## 2018-08-13 PROCEDURE — 83735 ASSAY OF MAGNESIUM: CPT | Performed by: INTERNAL MEDICINE

## 2018-08-13 PROCEDURE — 93306 TTE W/DOPPLER COMPLETE: CPT | Performed by: INTERNAL MEDICINE

## 2018-08-13 PROCEDURE — C1769 GUIDE WIRE: HCPCS | Performed by: INTERNAL MEDICINE

## 2018-08-13 PROCEDURE — B2111ZZ FLUOROSCOPY OF MULTIPLE CORONARY ARTERIES USING LOW OSMOLAR CONTRAST: ICD-10-PCS | Performed by: INTERNAL MEDICINE

## 2018-08-13 PROCEDURE — 0 IOPAMIDOL PER 1 ML: Performed by: INTERNAL MEDICINE

## 2018-08-13 PROCEDURE — 93306 TTE W/DOPPLER COMPLETE: CPT

## 2018-08-13 PROCEDURE — 25010000002 FENTANYL CITRATE (PF) 100 MCG/2ML SOLUTION: Performed by: INTERNAL MEDICINE

## 2018-08-13 PROCEDURE — 94799 UNLISTED PULMONARY SVC/PX: CPT

## 2018-08-13 PROCEDURE — 99153 MOD SED SAME PHYS/QHP EA: CPT | Performed by: INTERNAL MEDICINE

## 2018-08-13 PROCEDURE — 99232 SBSQ HOSP IP/OBS MODERATE 35: CPT | Performed by: INTERNAL MEDICINE

## 2018-08-13 PROCEDURE — 80053 COMPREHEN METABOLIC PANEL: CPT | Performed by: INTERNAL MEDICINE

## 2018-08-13 PROCEDURE — 04QK3ZZ REPAIR RIGHT FEMORAL ARTERY, PERCUTANEOUS APPROACH: ICD-10-PCS | Performed by: INTERNAL MEDICINE

## 2018-08-13 PROCEDURE — 4A023N7 MEASUREMENT OF CARDIAC SAMPLING AND PRESSURE, LEFT HEART, PERCUTANEOUS APPROACH: ICD-10-PCS | Performed by: INTERNAL MEDICINE

## 2018-08-13 RX ORDER — MAGNESIUM SULFATE HEPTAHYDRATE 40 MG/ML
2 INJECTION, SOLUTION INTRAVENOUS ONCE
Status: COMPLETED | OUTPATIENT
Start: 2018-08-13 | End: 2018-08-13

## 2018-08-13 RX ORDER — ONDANSETRON 4 MG/1
4 TABLET, FILM COATED ORAL EVERY 6 HOURS PRN
Status: DISCONTINUED | OUTPATIENT
Start: 2018-08-13 | End: 2018-08-15 | Stop reason: HOSPADM

## 2018-08-13 RX ORDER — HEPARIN SODIUM 1000 [USP'U]/ML
INJECTION, SOLUTION INTRAVENOUS; SUBCUTANEOUS AS NEEDED
Status: DISCONTINUED | OUTPATIENT
Start: 2018-08-13 | End: 2018-08-13 | Stop reason: HOSPADM

## 2018-08-13 RX ORDER — ONDANSETRON 2 MG/ML
4 INJECTION INTRAMUSCULAR; INTRAVENOUS EVERY 6 HOURS PRN
Status: DISCONTINUED | OUTPATIENT
Start: 2018-08-13 | End: 2018-08-15 | Stop reason: HOSPADM

## 2018-08-13 RX ORDER — FENTANYL CITRATE 50 UG/ML
INJECTION, SOLUTION INTRAMUSCULAR; INTRAVENOUS AS NEEDED
Status: DISCONTINUED | OUTPATIENT
Start: 2018-08-13 | End: 2018-08-13 | Stop reason: HOSPADM

## 2018-08-13 RX ORDER — MIDAZOLAM HYDROCHLORIDE 1 MG/ML
INJECTION INTRAMUSCULAR; INTRAVENOUS AS NEEDED
Status: DISCONTINUED | OUTPATIENT
Start: 2018-08-13 | End: 2018-08-13 | Stop reason: HOSPADM

## 2018-08-13 RX ORDER — SODIUM CHLORIDE 9 MG/ML
INJECTION, SOLUTION INTRAVENOUS CONTINUOUS PRN
Status: COMPLETED | OUTPATIENT
Start: 2018-08-13 | End: 2018-08-13

## 2018-08-13 RX ORDER — POTASSIUM CHLORIDE 20 MEQ/1
40 TABLET, EXTENDED RELEASE ORAL ONCE
Status: DISCONTINUED | OUTPATIENT
Start: 2018-08-13 | End: 2018-08-13

## 2018-08-13 RX ORDER — SODIUM CHLORIDE 9 MG/ML
100 INJECTION, SOLUTION INTRAVENOUS ONCE
Status: COMPLETED | OUTPATIENT
Start: 2018-08-13 | End: 2018-08-13

## 2018-08-13 RX ORDER — VERAPAMIL HYDROCHLORIDE 2.5 MG/ML
INJECTION, SOLUTION INTRAVENOUS AS NEEDED
Status: DISCONTINUED | OUTPATIENT
Start: 2018-08-13 | End: 2018-08-13 | Stop reason: HOSPADM

## 2018-08-13 RX ORDER — ONDANSETRON 4 MG/1
4 TABLET, ORALLY DISINTEGRATING ORAL EVERY 6 HOURS PRN
Status: DISCONTINUED | OUTPATIENT
Start: 2018-08-13 | End: 2018-08-15 | Stop reason: HOSPADM

## 2018-08-13 RX ORDER — LIDOCAINE HYDROCHLORIDE 20 MG/ML
INJECTION, SOLUTION INFILTRATION; PERINEURAL AS NEEDED
Status: DISCONTINUED | OUTPATIENT
Start: 2018-08-13 | End: 2018-08-13 | Stop reason: HOSPADM

## 2018-08-13 RX ORDER — POTASSIUM CHLORIDE 20 MEQ/1
40 TABLET, EXTENDED RELEASE ORAL 2 TIMES DAILY WITH MEALS
Status: COMPLETED | OUTPATIENT
Start: 2018-08-13 | End: 2018-08-14

## 2018-08-13 RX ADMIN — ASPIRIN 81 MG: 81 TABLET, DELAYED RELEASE ORAL at 09:21

## 2018-08-13 RX ADMIN — HYDROCODONE BITARTRATE AND ACETAMINOPHEN 1 TABLET: 5; 325 TABLET ORAL at 09:25

## 2018-08-13 RX ADMIN — MAGNESIUM SULFATE IN WATER 2 G: 40 INJECTION, SOLUTION INTRAVENOUS at 18:52

## 2018-08-13 RX ADMIN — ATORVASTATIN CALCIUM 40 MG: 40 TABLET, FILM COATED ORAL at 21:05

## 2018-08-13 RX ADMIN — SODIUM CHLORIDE 100 ML/HR: 9 INJECTION, SOLUTION INTRAVENOUS at 20:32

## 2018-08-13 RX ADMIN — DIAZEPAM 2.5 MG: 5 TABLET ORAL at 09:20

## 2018-08-13 RX ADMIN — MAGNESIUM GLUCONATE 500 MG ORAL TABLET 400 MG: 500 TABLET ORAL at 18:52

## 2018-08-13 RX ADMIN — POTASSIUM CHLORIDE 40 MEQ: 1500 TABLET, EXTENDED RELEASE ORAL at 09:22

## 2018-08-13 RX ADMIN — DIAZEPAM 2.5 MG: 5 TABLET ORAL at 21:05

## 2018-08-13 RX ADMIN — POTASSIUM CHLORIDE 40 MEQ: 1500 TABLET, EXTENDED RELEASE ORAL at 17:49

## 2018-08-13 RX ADMIN — SENNOSIDES AND DOCUSATE SODIUM 2 TABLET: 8.6; 5 TABLET ORAL at 21:05

## 2018-08-13 RX ADMIN — METOPROLOL TARTRATE 50 MG: 50 TABLET, FILM COATED ORAL at 21:06

## 2018-08-13 RX ADMIN — CLOPIDOGREL 75 MG: 75 TABLET, FILM COATED ORAL at 09:21

## 2018-08-13 RX ADMIN — Medication 1 CAPSULE: at 09:19

## 2018-08-13 NOTE — NURSING NOTE
Time In 1708 Time Out 1715      Order received for Cardiac Rehab Consultation.     CR staff will follow up with patient      Information discussed with: Patient        Educated on: Benefits of Exercise,  Educated on Cardiac Rehab and Program Protocol, Brochure and/or educational material provided, Contact information given and Teach Back Verified      Comments:Patient said to call her back after she is discharged and can talk to her family. They will have to figure out if they can work a schedule out to were she will have a ride to and from Cardiac Rehab three days a week.       Thank you for the referral. Please contact the Cardiac Rehab Dept. (ext. [phone]) with any further questions or concerns.

## 2018-08-13 NOTE — PROGRESS NOTES
Discharge Planning Assessment   [place]     Patient Name: Emily Roberto  MRN: [id number]  Today's Date: 8/13/2018    Admit Date: 8/11/2018          Discharge Needs Assessment     Row Name 08/13/18 1609       Living Environment    Lives With sibling(s)   SS spoke with pt on this date. Pt lives at home with sister Elizabeth. Pt to return home at discharge.     Current Living Arrangements home/apartment/condo    Primary Care Provided by self    Family Caregiver if Needed sibling(s)    Quality of Family Relationships helpful;involved;supportive    Able to Return to Prior Arrangements yes       Resource/Environmental Concerns    Resource/Environmental Concerns none    Transportation Concerns car, none       Transition Planning    Patient/Family Anticipates Transition to home with family    Patient/Family Anticipated Services at Transition none    Transportation Anticipated family or friend will provide       Discharge Needs Assessment    Equipment Currently Used at Home oxygen;nebulizer;bipap/cpap;wheelchair   DME provider [facility].     Equipment Needed After Discharge none            Discharge Plan     Row Name 08/13/18 3020       Plan    Plan Pt lives at home with sister and plans to return home at discharge. Pt does not have home health services at this time. Pt utilizes [facility] with no issues. Pt has good family support. Pt does not have a POA or living will. SS will follow and assist as needed.         Demographic Summary     Row Name 08/13/18 1606       General Information    Referral Source nursing    Reason for Consult discharge planning    Preferred Language English             Mesha Newton

## 2018-08-13 NOTE — PROGRESS NOTES
Chief complaint: Altered mental status    Subjective: Patient says she is feeling better today and has no complaints.  Patient's daughters are here and they feel her mental status continues to improve but is not quite back to baseline yet.    Review of Systems   Gen.: No fever or chills  Pulmonary: No shortness of breath or productive cough  Cardiac: No chest pain or peripheral edema  Gastrointestinal: No abdominal pain, nausea, vomiting or diarrhea  Neurologic: No focal numbness or weakness    Temp:  [97.3 °F (36.3 °C)-98.1 °F (36.7 °C)] 98.1 °F (36.7 °C)  Heart Rate:  [50-87] 50  Resp:  [14-16] 14  BP: (114-175)/() 138/74  I/O last 3 completed shifts:  In: 1140 [P.O.:790; IV Piggyback:350]  Out: 825 [Urine:825]  No intake/output data recorded.    Physical Exam   Gen.: In no distress.  Pulmonary: Lungs with slight crackles at left base, otherwise clear bilaterally, normal respiratory effort  Cardiac: Heart irregular, no murmur, no edema  Gastrointestinal: Abdomen soft, nontender, no organomegaly  Neurologic: Alert, no focal motor or sensory deficits, oriented ×3 today    Assessment and plan:     Acute encephalopathy: Improved.  Likely metabolic.  Continue to follow.     Non-ST elevation myocardial infarction: Stable.  As per cardiology.     Atrial fibrillation: New diagnosis.  Duration unknown. As per cardiology.     Left lower lobe pneumonia: Improving  Day #3 of empiric Rocephin and Zithromax    Hypokalemia: Replace.  Check magnesium level.  Patient has history of hypokalemia in the past     COPD: Patient has BiPAP machine she uses at home only at night.  She also has oxygen which she only uses as needed.   Continue albuterol as needed.     Chronic hypercapnic respiratory failure: Stable     Hypertension: Controlled this morning.     Diabetes mellitus type II: Controlled.  Continue to hold oral medications and cover sliding scale insulin for now.     Chronic pain syndrome: Patient currently on as needed  hydrocodone as at home     Chronic anxiety: Patient on twice daily Valium at one half the dose she takes at home

## 2018-08-13 NOTE — PLAN OF CARE
Problem: Fall Risk (Adult)  Goal: Identify Related Risk Factors and Signs and Symptoms  Outcome: Outcome(s) achieved Date Met: 08/13/18    Goal: Absence of Fall  Outcome: Ongoing (interventions implemented as appropriate)      Problem: Patient Care Overview  Goal: Plan of Care Review  Outcome: Ongoing (interventions implemented as appropriate)      Problem: Cardiac: ACS (Acute Coronary Syndrome) (Adult)  Goal: Signs and Symptoms of Listed Potential Problems Will be Absent, Minimized or Managed (Cardiac: ACS)  Outcome: Ongoing (interventions implemented as appropriate)      Problem: Skin Injury Risk (Adult)  Goal: Identify Related Risk Factors and Signs and Symptoms  Outcome: Outcome(s) achieved Date Met: 08/13/18    Goal: Skin Health and Integrity  Outcome: Ongoing (interventions implemented as appropriate)

## 2018-08-13 NOTE — PROGRESS NOTES
LOS: 2 days   Patient Care Team:  Bakari Glez MD as PCP - General (Family Medicine)      Subjective         Admission information:    Emily oRberto is a 73 y.o. female with past medical history significant for known ASCVD status post stenting of the mid LAD with placement of overlapping 2.25 x 28 and 2.5 x 18 mm BENTLEY on 12/5/2016 and also stent in the ostium of the right coronary artery 30% residual stenosis after stenting and the posterior descending artery with 0 residual stenosis.  Patient also has a past medical history significant for diabetes mellitus type 2, essential hypertension, dyslipidemia, with occasional tobacco abuse, and COPD.               During my evaluation most questions were answered by the daughter that was at bedside the patient was still somewhat confused although it seems that her confusion has improved.              The patient was able to state that she had chest pains earlier today those described as an ache and she rated the pain as 7 out of 10 on the pain scale and is unable to guess how long the pain lasted.  She does not believe she had any associated symptoms either.               The daughter states that she brought her to the ER yesterday due to confusion and weakness.  She does state that the patient recently had a procedure on her back where she had a cyst removed at which time she was noted to be in atrial fibrillation with a controlled rate of about a 100. The daughter also states that the patient has been symptom-free in terms of no chest pain, shortness of breath, palpitations or syncope.  She was stating she was so weak to the point where she couldn't get out of bed and walk to the bathroom.  The daughter also states that when her atrial fibrillation was diagnosed while having the procedure done that this was new to her and has never been told she has atrial fibrillation before.              Upon arrival in the emergency department her initial troponin came  back as 1.786 trended up to a peak of 2.390 on 8/12/2018 at 12:47 AM with the most recent troponin being 1.491 on 8/12/2018 at 7:08 AM.  Chest x-ray showed cardiomegaly and left lower lobe airspace disease and small left effusion per radiology.  Patient also had a CT of the head performed that showed senescent changes. No CT evidence of acute intracranial  abnormality.  Per radiology.    Reason for follow- up: Non-ST elevation myocardial infarction.    Interval History:     The patient states she is doing well today.  She denies any chest pain, shortness of breath or palpitations.    History taken from: patient chart family    Vital Signs  Temp:  [97.3 °F (36.3 °C)-98.1 °F (36.7 °C)] 98 °F (36.7 °C)  Heart Rate:  [50-87] 58  Resp:  [14-16] 14  BP: (105-175)/() 124/77    Physical Exam:     Physical Exam   Constitutional: She is oriented to person, place, and time. She appears well-developed and well-nourished.   Obese white female lying comfortably on bed.   HENT:   Mouth/Throat: Oropharynx is clear and moist.   Eyes: Pupils are equal, round, and reactive to light. EOM are normal.   Neck: Neck supple. No JVD present. No tracheal deviation present. No thyromegaly present.   Cardiovascular: Normal rate, regular rhythm, S1 normal and S2 normal.  Exam reveals no gallop and no friction rub.    No murmur heard.  Pulmonary/Chest: Effort normal and breath sounds normal. No respiratory distress. She has no wheezes. She has no rales.   Abdominal: Soft. Bowel sounds are normal. She exhibits no mass. There is no tenderness.   Musculoskeletal: Normal range of motion. She exhibits no edema.   Lymphadenopathy:     She has no cervical adenopathy.   Neurological: She is alert and oriented to person, place, and time.   Skin: Skin is warm and dry. No rash noted.   Psychiatric: She has a normal mood and affect.       Lab Results (last 24 hours)     Procedure Component Value Units Date/Time    POC Glucose Once [[id number]]  (Normal)  Collected:  08/13/18 1041    Specimen:  Blood Updated:  08/13/18 1047     Glucose 107 mg/dL     Narrative:       Meter: [id number] : [id number] MAYUR SHEFFIELD    POC Glucose Once [[id number]]  (Normal) Collected:  08/13/18 0659    Specimen:  Blood Updated:  08/13/18 0705     Glucose 95 mg/dL     Narrative:       Meter: [id number] : [id number] zenon qiu    aPTT [[id number]]  (Abnormal) Collected:  08/13/18 0546    Specimen:  Blood Updated:  08/13/18 0621     PTT 63.8 (H) seconds      Comment: Note new Reference Range       Narrative:       PTT Heparin Therapeutic Range:  59 - 95 seconds    Osmolality, Calculated [[id number]]  (Normal) Collected:  08/13/18 0546    Specimen:  Blood Updated:  08/13/18 0618     Osmolality Calc 279.2 mOsm/kg     Comprehensive Metabolic Panel [[id number]]  (Abnormal) Collected:  08/13/18 0546    Specimen:  Blood Updated:  08/13/18 0618     Glucose 100 mg/dL      BUN 12 mg/dL      Creatinine 0.65 mg/dL      Sodium 140 mmol/L      Potassium 3.1 (L) mmol/L      Chloride 107 mmol/L      CO2 26.7 mmol/L      Calcium 8.5 mg/dL      Total Protein 5.8 (L) g/dL      Albumin 3.20 (L) g/dL      ALT (SGPT) 26 U/L      AST (SGOT) 34 (H) U/L      Alkaline Phosphatase 59 U/L      Comment: Note New Reference Ranges        Total Bilirubin 1.0 mg/dL      eGFR Non African Amer 90 mL/min/1.73      Globulin 2.6 gm/dL      A/G Ratio 1.2 (L) g/dL      BUN/Creatinine Ratio 18.5     Anion Gap 6.3 mmol/L     Narrative:       The MDRD GFR formula is only valid for adults with stable renal function between ages 18 and 70.    Troponin [[id number]]  (Abnormal) Collected:  08/13/18 0044    Specimen:  Blood Updated:  08/13/18 0238     Troponin I 0.673 (H) ng/mL     Narrative:       Ultra Troponin I Reference Range:         <=0.039 ng/mL: Negative    0.04-0.779 ng/mL: Indeterminate Range. Suspicious of MI.  Clinical correlation required.       >=0.78  ng/mL: Consistent with myocardial injury.  Clinical correlation  required.    Troponin [[id number]]  (Abnormal) Collected:  08/12/18 1914    Specimen:  Blood Updated:  08/12/18 2010     Troponin I 0.904 (C) ng/mL     Narrative:       Ultra Troponin I Reference Range:         <=0.039 ng/mL: Negative    0.04-0.779 ng/mL: Indeterminate Range. Suspicious of MI.  Clinical correlation required.       >=0.78  ng/mL: Consistent with myocardial injury.  Clinical correlation required.    POC Glucose Once [[id number]]  (Abnormal) Collected:  08/12/18 1858    Specimen:  Blood Updated:  08/12/18 1908     Glucose 217 (H) mg/dL     Narrative:       Meter: [id number] : [id number] Vandana Marino    POC Glucose Once [[id number]]  (Normal) Collected:  08/12/18 1630    Specimen:  Blood Updated:  08/12/18 1636     Glucose 128 mg/dL     Narrative:       Meter: [id number] : [id number] CHELSEA TYLER    Blood Culture - Blood, [[id number]]  (Normal) Collected:  08/11/18 1420    Specimen:  Blood from Arm, Left Updated:  08/12/18 1545     Blood Culture No growth at 24 hours    Blood Culture - Blood, [[id number]]  (Normal) Collected:  08/11/18 1428    Specimen:  Blood from Arm, Left Updated:  08/12/18 1545     Blood Culture No growth at 24 hours    Troponin [[id number]]  (Abnormal) Collected:  08/12/18 1254    Specimen:  Blood Updated:  08/12/18 1346     Troponin I 1.213 (C) ng/mL     Narrative:       Ultra Troponin I Reference Range:         <=0.039 ng/mL: Negative    0.04-0.779 ng/mL: Indeterminate Range. Suspicious of MI.  Clinical correlation required.       >=0.78  ng/mL: Consistent with myocardial injury.  Clinical correlation required.    aPTT [[id number]]  (Abnormal) Collected:  08/12/18 1254    Specimen:  Blood Updated:  08/12/18 1322     PTT 59.5 (H) seconds      Comment: Note new Reference Range       Narrative:       PTT Heparin Therapeutic Range:  59 - 95 seconds    POC Glucose Once [[id number]]  (Normal) Collected:  08/12/18 1138    Specimen:  Blood Updated:  08/12/18 1145     Glucose 129 mg/dL      Narrative:       Meter: [id number] : [id number] zenon qiu          Medication:  Scheduled Meds:  aspirin 81 mg Oral Daily   atorvastatin 40 mg Oral Nightly   azithromycin 500 mg Intravenous Q24H   clopidogrel 75 mg Oral Daily   diazePAM 2.5 mg Oral Q12H   diltiaZEM 20 mg Intravenous Once   insulin aspart 0-9 Units Subcutaneous 4x Daily AC & at Bedtime   lactobacillus acidophilus 1 capsule Oral Daily   metoprolol tartrate 50 mg Oral Q12H   potassium chloride 40 mEq Oral BID With Meals   sennosides-docusate sodium 2 tablet Oral Nightly     Continuous Infusions:  heparin (porcine) 11.3 Units/kg/hr Last Rate: 11.24 Units/kg/hr (08/12/18 1343)     PRN Meds:.dextrose  •  dextrose  •  glucagon (human recombinant)  •  heparin (porcine)  •  HYDROcodone-acetaminophen  •  magnesium hydroxide  •  polyethylene glycol  •  Insert peripheral IV **AND** sodium chloride    Telemetry: Atrial fibrillation in the 60s to 70s.      Assessment/Plan     1. Acute and NSTEMI initially trending up however now trending down  2. History of known ASCVD status post stenting of the LAD, RCA, and posterior descending artery.  3. Essential hypertension, controlled  4. Dyslipidemia  5. Diabetes mellitus type 2  6. Tobacco abuse  7. Acute delirium  8. Left lower lobe pneumonia  9. Atrial fibrillation:    Plan:    1.  Non-ST elevation myocardial infarction: Patient with non-ST elevation myocardial infarctions with troponins and subsequently trended down.  I discussed the option of medical management versus cardiac catheterization with the patient and she states she wants to undergo cardiac catheterization.  I discussed this with Dr. Ortega and he is willing to perform this today.  May consider adding an ACE inhibitor after the cardiac catheterization is completed.    2.  Atrial fibrillation: Patient but appears to be new onset atrial fibrillation.  She is currently on a heparin infusion for stroke prevention.  We'll continue these as she is to  undergo cardiac catheterization later today.  She is adequately rate controlled on metoprolol.  Would continue.    3.  Dyslipidemia: Patient with history of dyslipidemia who is currently on statin therapy.  Since she recently underwent increasing her atorvastatin dose will continue to monitor.    Disposition: The patient is noted to be a patient of Dr. Fournier.  I have discussed this with Dr. Ortega and Shelli Benedict.  We'll transfer the care of this patient tomorrow.    Blair Thomas MD  08/13/18  11:39 AM    Dragon disclaimer:  Much of this encounter note is an electronic transcription/translation of spoken language to printed text. The electronic translation of spoken language may permit erroneous, or at times, nonsensical words or phrases to be inadvertently transcribed; Although I have reviewed the note for such errors, some may still exist.

## 2018-08-13 NOTE — PLAN OF CARE
Problem: Fall Risk (Adult)  Goal: Absence of Fall  Outcome: Ongoing (interventions implemented as appropriate)      Problem: Cardiac: ACS (Acute Coronary Syndrome) (Adult)  Goal: Signs and Symptoms of Listed Potential Problems Will be Absent, Minimized or Managed (Cardiac: ACS)  Outcome: Ongoing (interventions implemented as appropriate)      Problem: Skin Injury Risk (Adult)  Goal: Skin Health and Integrity  Outcome: Ongoing (interventions implemented as appropriate)

## 2018-08-14 LAB
ANION GAP SERPL CALCULATED.3IONS-SCNC: 3.9 MMOL/L (ref 3.6–11.2)
BH CV ECHO MEAS - % IVS THICK: -10 %
BH CV ECHO MEAS - % LVPW THICK: 40.3 %
BH CV ECHO MEAS - ACS: 2 CM
BH CV ECHO MEAS - AO MAX PG: 10.1 MMHG
BH CV ECHO MEAS - AO MEAN PG: 3.9 MMHG
BH CV ECHO MEAS - AO ROOT AREA (BSA CORRECTED): 1.6
BH CV ECHO MEAS - AO ROOT AREA: 7.5 CM^2
BH CV ECHO MEAS - AO ROOT DIAM: 3.1 CM
BH CV ECHO MEAS - AO V2 MAX: 158.8 CM/SEC
BH CV ECHO MEAS - AO V2 MEAN: 91.1 CM/SEC
BH CV ECHO MEAS - AO V2 VTI: 36.9 CM
BH CV ECHO MEAS - BSA(HAYCOCK): 2 M^2
BH CV ECHO MEAS - BSA: 1.9 M^2
BH CV ECHO MEAS - BZI_BMI: 36.6 KILOGRAMS/M^2
BH CV ECHO MEAS - BZI_METRIC_HEIGHT: 157.5 CM
BH CV ECHO MEAS - BZI_METRIC_WEIGHT: 90.7 KG
BH CV ECHO MEAS - CONTRAST EF 4CH: 63.9 ML/M^2
BH CV ECHO MEAS - EDV(CUBED): 68.8 ML
BH CV ECHO MEAS - EDV(MOD-SP4): 36 ML
BH CV ECHO MEAS - EDV(TEICH): 74.2 ML
BH CV ECHO MEAS - EF(CUBED): 74.3 %
BH CV ECHO MEAS - EF(MOD-SP4): 63.9 %
BH CV ECHO MEAS - EF(TEICH): 66.6 %
BH CV ECHO MEAS - ESV(CUBED): 17.7 ML
BH CV ECHO MEAS - ESV(MOD-SP4): 13 ML
BH CV ECHO MEAS - ESV(TEICH): 24.8 ML
BH CV ECHO MEAS - FS: 36.4 %
BH CV ECHO MEAS - IVS/LVPW: 1.3
BH CV ECHO MEAS - IVSD: 1.4 CM
BH CV ECHO MEAS - IVSS: 1.3 CM
BH CV ECHO MEAS - LA DIMENSION: 4 CM
BH CV ECHO MEAS - LA/AO: 1.3
BH CV ECHO MEAS - LV DIASTOLIC VOL/BSA (35-75): 18.8 ML/M^2
BH CV ECHO MEAS - LV MASS(C)D: 183.7 GRAMS
BH CV ECHO MEAS - LV MASS(C)DI: 96.1 GRAMS/M^2
BH CV ECHO MEAS - LV MASS(C)S: 119 GRAMS
BH CV ECHO MEAS - LV MASS(C)SI: 62.2 GRAMS/M^2
BH CV ECHO MEAS - LV SYSTOLIC VOL/BSA (12-30): 6.8 ML/M^2
BH CV ECHO MEAS - LVIDD: 4.1 CM
BH CV ECHO MEAS - LVIDS: 2.6 CM
BH CV ECHO MEAS - LVLD AP4: 6.3 CM
BH CV ECHO MEAS - LVLS AP4: 4.9 CM
BH CV ECHO MEAS - LVOT AREA (M): 3.8 CM^2
BH CV ECHO MEAS - LVOT AREA: 3.7 CM^2
BH CV ECHO MEAS - LVOT DIAM: 2.2 CM
BH CV ECHO MEAS - LVPWD: 1.1 CM
BH CV ECHO MEAS - LVPWS: 1.6 CM
BH CV ECHO MEAS - MV A MAX VEL: 55.5 CM/SEC
BH CV ECHO MEAS - MV E MAX VEL: 131.6 CM/SEC
BH CV ECHO MEAS - MV E/A: 2.4
BH CV ECHO MEAS - PA ACC SLOPE: 989.5 CM/SEC^2
BH CV ECHO MEAS - PA ACC TIME: 0.11 SEC
BH CV ECHO MEAS - PA PR(ACCEL): 31.5 MMHG
BH CV ECHO MEAS - RAP SYSTOLE: 10 MMHG
BH CV ECHO MEAS - RVDD: 1.3 CM
BH CV ECHO MEAS - RVSP: 47.6 MMHG
BH CV ECHO MEAS - SI(AO): 145.7 ML/M^2
BH CV ECHO MEAS - SI(CUBED): 26.7 ML/M^2
BH CV ECHO MEAS - SI(MOD-SP4): 12 ML/M^2
BH CV ECHO MEAS - SI(TEICH): 25.8 ML/M^2
BH CV ECHO MEAS - SV(AO): 278.4 ML
BH CV ECHO MEAS - SV(CUBED): 51.1 ML
BH CV ECHO MEAS - SV(MOD-SP4): 23 ML
BH CV ECHO MEAS - SV(TEICH): 49.4 ML
BH CV ECHO MEAS - TR MAX VEL: 306.8 CM/SEC
BUN BLD-MCNC: 11 MG/DL (ref 7–21)
BUN/CREAT SERPL: 17.5 (ref 7–25)
CALCIUM SPEC-SCNC: 8.4 MG/DL (ref 7.7–10)
CHLORIDE SERPL-SCNC: 109 MMOL/L (ref 99–112)
CO2 SERPL-SCNC: 26.1 MMOL/L (ref 24.3–31.9)
CREAT BLD-MCNC: 0.63 MG/DL (ref 0.43–1.29)
DEPRECATED RDW RBC AUTO: 42.6 FL (ref 37–54)
ERYTHROCYTE [DISTWIDTH] IN BLOOD BY AUTOMATED COUNT: 13.3 % (ref 11.5–14.5)
GFR SERPL CREATININE-BSD FRML MDRD: 93 ML/MIN/1.73
GLUCOSE BLD-MCNC: 104 MG/DL (ref 70–110)
GLUCOSE BLDC GLUCOMTR-MCNC: 115 MG/DL (ref 70–130)
GLUCOSE BLDC GLUCOMTR-MCNC: 115 MG/DL (ref 70–130)
GLUCOSE BLDC GLUCOMTR-MCNC: 120 MG/DL (ref 70–130)
GLUCOSE BLDC GLUCOMTR-MCNC: 123 MG/DL (ref 70–130)
HCT VFR BLD AUTO: 32.7 % (ref 37–47)
HGB BLD-MCNC: 11.2 G/DL (ref 12–16)
MAXIMAL PREDICTED HEART RATE: 148 BPM
MCH RBC QN AUTO: 31.7 PG (ref 27–33)
MCHC RBC AUTO-ENTMCNC: 34.3 G/DL (ref 33–37)
MCV RBC AUTO: 92.6 FL (ref 80–94)
OSMOLALITY SERPL CALC.SUM OF ELEC: 277.2 MOSM/KG (ref 273–305)
PLATELET # BLD AUTO: 146 10*3/MM3 (ref 130–400)
PMV BLD AUTO: 10.6 FL (ref 6–10)
POTASSIUM BLD-SCNC: 3.6 MMOL/L (ref 3.5–5.3)
PROCALCITONIN SERPL-MCNC: 0.09 NG/ML (ref 0–0.08)
RBC # BLD AUTO: 3.53 10*6/MM3 (ref 4.2–5.4)
SODIUM BLD-SCNC: 139 MMOL/L (ref 135–153)
STRESS TARGET HR: 126 BPM
WBC NRBC COR # BLD: 6.58 10*3/MM3 (ref 4.5–12.5)

## 2018-08-14 PROCEDURE — 82962 GLUCOSE BLOOD TEST: CPT

## 2018-08-14 PROCEDURE — 80048 BASIC METABOLIC PNL TOTAL CA: CPT | Performed by: INTERNAL MEDICINE

## 2018-08-14 PROCEDURE — 99232 SBSQ HOSP IP/OBS MODERATE 35: CPT | Performed by: NURSE PRACTITIONER

## 2018-08-14 PROCEDURE — 94799 UNLISTED PULMONARY SVC/PX: CPT

## 2018-08-14 PROCEDURE — 25010000002 AZITHROMYCIN: Performed by: HOSPITALIST

## 2018-08-14 PROCEDURE — 85027 COMPLETE CBC AUTOMATED: CPT | Performed by: INTERNAL MEDICINE

## 2018-08-14 PROCEDURE — 25010000002 HEPARIN (PORCINE) PER 1000 UNITS: Performed by: HOSPITALIST

## 2018-08-14 PROCEDURE — 99232 SBSQ HOSP IP/OBS MODERATE 35: CPT | Performed by: HOSPITALIST

## 2018-08-14 PROCEDURE — 93005 ELECTROCARDIOGRAM TRACING: CPT | Performed by: INTERNAL MEDICINE

## 2018-08-14 RX ORDER — HEPARIN SODIUM 5000 [USP'U]/ML
5000 INJECTION, SOLUTION INTRAVENOUS; SUBCUTANEOUS EVERY 12 HOURS SCHEDULED
Status: DISCONTINUED | OUTPATIENT
Start: 2018-08-14 | End: 2018-08-14

## 2018-08-14 RX ADMIN — MAGNESIUM GLUCONATE 500 MG ORAL TABLET 400 MG: 500 TABLET ORAL at 08:30

## 2018-08-14 RX ADMIN — CLOPIDOGREL 75 MG: 75 TABLET, FILM COATED ORAL at 08:30

## 2018-08-14 RX ADMIN — HYDROCODONE BITARTRATE AND ACETAMINOPHEN 1 TABLET: 5; 325 TABLET ORAL at 11:54

## 2018-08-14 RX ADMIN — DIAZEPAM 2.5 MG: 5 TABLET ORAL at 20:03

## 2018-08-14 RX ADMIN — AZITHROMYCIN MONOHYDRATE 500 MG: 500 INJECTION, POWDER, LYOPHILIZED, FOR SOLUTION INTRAVENOUS at 16:51

## 2018-08-14 RX ADMIN — POTASSIUM CHLORIDE 40 MEQ: 1500 TABLET, EXTENDED RELEASE ORAL at 08:29

## 2018-08-14 RX ADMIN — Medication 1 CAPSULE: at 08:30

## 2018-08-14 RX ADMIN — METOPROLOL TARTRATE 50 MG: 50 TABLET, FILM COATED ORAL at 08:29

## 2018-08-14 RX ADMIN — HYDROCODONE BITARTRATE AND ACETAMINOPHEN 1 TABLET: 5; 325 TABLET ORAL at 20:02

## 2018-08-14 RX ADMIN — ASPIRIN 81 MG: 81 TABLET, DELAYED RELEASE ORAL at 08:30

## 2018-08-14 RX ADMIN — ATORVASTATIN CALCIUM 40 MG: 40 TABLET, FILM COATED ORAL at 20:02

## 2018-08-14 RX ADMIN — DIAZEPAM 2.5 MG: 5 TABLET ORAL at 08:30

## 2018-08-14 RX ADMIN — HEPARIN SODIUM 5000 UNITS: 5000 INJECTION, SOLUTION INTRAVENOUS; SUBCUTANEOUS at 11:48

## 2018-08-14 RX ADMIN — APIXABAN 5 MG: 5 TABLET, FILM COATED ORAL at 20:02

## 2018-08-14 NOTE — PROGRESS NOTES
Discharge Planning Assessment   [facility]     Patient Name: Emily Roberto  MRN: [id number]  Today's Date: 8/14/2018    Admit Date: 8/11/2018      Discharge Plan     Row Name 08/14/18 1337       Plan    Plan SS contacted Karina in pharmacy regarding the pt's family stating the pt has a difficult time paying for prescription coverage.  Karina is agreeable to evaluate. SS will follow.     Patient/Family in Agreement with Plan yes             Anuradha Espinoza

## 2018-08-14 NOTE — PLAN OF CARE
Problem: Fall Risk (Adult)  Goal: Absence of Fall  Outcome: Ongoing (interventions implemented as appropriate)      Problem: Patient Care Overview  Goal: Plan of Care Review  Outcome: Ongoing (interventions implemented as appropriate)      Problem: Cardiac: ACS (Acute Coronary Syndrome) (Adult)  Goal: Signs and Symptoms of Listed Potential Problems Will be Absent, Minimized or Managed (Cardiac: ACS)  Outcome: Ongoing (interventions implemented as appropriate)      Problem: Skin Injury Risk (Adult)  Goal: Skin Health and Integrity  Outcome: Ongoing (interventions implemented as appropriate)      Problem: Cardiac Catheterization (Diagnostic/Interventional) (Adult)  Goal: Signs and Symptoms of Listed Potential Problems Will be Absent, Minimized or Managed (Cardiac Catheterization)  Outcome: Ongoing (interventions implemented as appropriate)

## 2018-08-14 NOTE — NURSING NOTE
"Transitional Care Note    Enrolled in [facility] Transitional Care Note    Enrolled in [facility] Transitional Care Services under theTCM Model to be followed for 6 weeks post discharge.  [facility] will assist with support and education at time of transition home from hospital.  Hospital  will follow throughout the stay at [facility].  Home  will visit within 48 hours of discharge and follow with home vitals and telephone contact for 6 weeks.      Patient admitted to [facility] via Emergency Department, complaints of altered mental status.  Admitted for treatment of acute encephalopathy.    Patient lying in bed flat she just underwent heart cath today.  Pleasant and talkative.  Explained transition to home program and patient is agreeable to home visits.  Home  will be Sally Daly RN    Clinical Assessment Instrument Scores:  >Short Portable Mental Status 10, normal mental function  >Geriatric Depression Scale 3  >IADL 4, dependent with some ADL's  >FAROOQ-ADL 6, independent with self-care  >Overall Quality of Life \"FAIR\"  >Subjective Health Rating \"FAIR\"  >Symptom Bother Scale 26  >General Anxiety Scale 4  >REAL SF 0 indicates reads on 3rd grade level or less due to poor vision per patient  "

## 2018-08-14 NOTE — PLAN OF CARE
Problem: Fall Risk (Adult)  Goal: Absence of Fall  Outcome: Ongoing (interventions implemented as appropriate)   08/13/18 2315   Fall Risk (Adult)   Absence of Fall making progress toward outcome       Problem: Patient Care Overview  Goal: Plan of Care Review  Outcome: Ongoing (interventions implemented as appropriate)   08/13/18 2315   Coping/Psychosocial   Plan of Care Reviewed With patient   Plan of Care Review   Progress improving     Goal: Discharge Needs Assessment  Outcome: Ongoing (interventions implemented as appropriate)   08/13/18 1609 08/13/18 2315   Discharge Needs Assessment   Patient/Family Anticipates Transition to --  home with family   Patient/Family Anticipated Services at Transition --  none   Transportation Concerns --  car, none   Transportation Anticipated --  family or friend will provide   Equipment Needed After Discharge --  none   Disability   Equipment Currently Used at Home oxygen;nebulizer;bipap/cpap;wheelchair  ([facility] provider [facility]. ) --        Problem: Skin Injury Risk (Adult)  Goal: Skin Health and Integrity  Outcome: Ongoing (interventions implemented as appropriate)   08/13/18 2315   Skin Injury Risk (Adult)   Skin Health and Integrity making progress toward outcome

## 2018-08-14 NOTE — PLAN OF CARE
Problem: Cardiac Catheterization (Diagnostic/Interventional) (Adult)  Goal: Signs and Symptoms of Listed Potential Problems Will be Absent, Minimized or Managed (Cardiac Catheterization)  Outcome: Ongoing (interventions implemented as appropriate)   08/13/18 5737   Goal/Outcome Evaluation   Problems Assessed (Cardiac Catheterization) all   Problems Present (Cardiac Cath) none

## 2018-08-14 NOTE — NURSING NOTE
RECEIVED CALL FROM TELEMETRY TECH, PATIENT HAVING 2.6 SECOND PAUSE WITH DECREASE IN HEART RATE. PATIENT DENIES ANY CHEST PAIN OR SOB, RESP EVEN NONLABORED, VITAL SIGNS STABLE, EKG REQUESTED.   3333 EKG RESULTS RECEIVED, DR MAY BERGERON NOTIFIED OF SITUATION AND RESULTS, WILL BE IN TO SEE PATIENT.

## 2018-08-14 NOTE — PROGRESS NOTES
LOS: 3 days     Name: Emily Roberto  Age/Sex: 73 y.o. female  :  1945        PCP: Bakari Glez MD    Active Problems:    NSTEMI (non-ST elevated myocardial infarction) (CMS/[facility])      Admission Information: Emily Roberto is a 73 y.o. female with a past medical history significant for coronary artery disease, status post PCI BENTLEY to the LAD and diagonal branch in 2016 and staged intervention PCI BENTLEY to the RCA in 2017, hyperlipidemia, and hypertension.  On 2018 Ms. Miller was brought to the emergency room by her family because of altered mental status.  She was noted to be in atrial fibrillation with controlled ventricular rate and her troponin was noted to be elevated at 1.786.  Heparin drip was initiated and patient was admitted for further evaluation and treatment    Chief Complaint: follow up cardiac catheterization, recent onset atrial fibrillation    Interval history: On 2018 Ms. Roberto underwent left heart  Catheterization which revealed mild obstructive coronary artery disease with patent stent of the LAD and mild in-stent restenosis of the proximal right coronary artery stent and patent are PDA stent.      VELIA Cerda, called to inform me that Ms. Roberto had had a 2.6 second pause.  Ms. Roberto was asleep at the time of the 2.6 second pause.  She was asleep when I arrived in the room.  Initially she was difficult to wake up, however she did eventually become alert and oriented.  She denies current chest pain.  She does report occasional palpitations.  She says that overall she feels much better than she did when she arrived at the hospital.  She expresses desire to be discharged.    Subjective     Review of Systems   Constitution: Negative for weakness.   Cardiovascular: Positive for irregular heartbeat and palpitations. Negative for chest pain, near-syncope and syncope.   Respiratory: Negative for shortness of breath.        Vital Signs  Vital Signs  (last 72 hrs)       08/11 0700  -  08/12 0659 08/12 0700  -  08/13 0659 08/13 0700  -  08/14 0659 08/14 0700  -  08/14 1615   Most Recent    Temp (°F) 98.2 -  98.6    97.3 -  98.1    97.7 -  98.2    97.9 -  99.3     99.3 (37.4)    Heart Rate 70 -  109    50 -  87    (!)49 -  67    (!)49 -  73     57    Resp 14 -  20    14 -  16    10 -  20      18     18    /84 -  156/88    114/67 -  (!) 175/109    105/62 -  163/91    124/62 -  164/81     124/62    SpO2 (%) (!)75 -  99    (!)85 -  100    96 -  100    95 -  100     97        Temp:  [97.7 °F (36.5 °C)-99.3 °F (37.4 °C)] 99.3 °F (37.4 °C)  Heart Rate:  [49-73] 57  Resp:  [10-20] 18  BP: (124-164)/() 124/62  Body mass index is 37.04 kg/m².      Intake/Output Summary (Last 24 hours) at 08/14/18 1615  Last data filed at 08/14/18 1530   Gross per 24 hour   Intake              600 ml   Output              626 ml   Net              -26 ml       Physical Exam   Constitutional: Vital signs are normal. She appears well-developed and well-nourished.   HENT:   Head: Normocephalic and atraumatic.   Eyes: Pupils are equal, round, and reactive to light. Conjunctivae and lids are normal.   Neck: No JVD present. Carotid bruit is not present.   Cardiovascular: Normal rate, regular rhythm, normal heart sounds and intact distal pulses.  PMI is not displaced.    Right radial access site: No hematoma, no ecchymosis, no signs of infection.  Pulses intact.   Pulmonary/Chest: Effort normal and breath sounds normal. No respiratory distress.   Abdominal: Soft. Normal appearance. There is no tenderness.   Neurological: She is alert.   Skin: Skin is warm and dry.   Psychiatric: She has a normal mood and affect. Cognition and memory are normal.       Telemetry:  Atrial fibrillation 40s to 60s, occasional pauses, maximum length of 2.6 seconds       Results Review:       Results from last 7 days  Lab Units 08/14/18  0140 08/12/18  0047 08/11/18  1416   WBC 10*3/mm3 6.58 10.14 9.22    HEMOGLOBIN g/dL 11.2* 12.9 13.2   PLATELETS 10*3/mm3 146 146 147       Results from last 7 days  Lab Units 08/14/18  0140 08/13/18  0546 08/12/18  0047 08/11/18  1416   SODIUM mmol/L 139 140 139 140   POTASSIUM mmol/L 3.6 3.1* 3.4* 3.7   CHLORIDE mmol/L 109 107 107 104   CO2 mmol/L 26.1 26.7 28.0 26.9   BUN mg/dL 11 12 17 20   CREATININE mg/dL 0.63 0.65 0.88 0.97   CALCIUM mg/dL 8.4 8.5 9.1 9.5   GLUCOSE mg/dL 104 100 126* 134*       Results from last 7 days  Lab Units 08/13/18  0044 08/12/18  1914 08/12/18  1254 08/12/18  0708 08/12/18  0047   CK TOTAL U/L  --   --   --   --  733*   TROPONIN I ng/mL 0.673* 0.904* 1.213* 1.491* 2.390*   CKMB ng/mL  --   --   --   --  8.96*         Results from last 7 days  Lab Units 08/11/18  1622   INR  1.29*       I reviewed the patient's new clinical results.  I reviewed the patient's new imaging results and agree with the interpretation.  I personally viewed and interpreted the patient's EKG/Telemetry data      Medication Review:     aspirin 81 mg Oral Daily   atorvastatin 40 mg Oral Nightly   azithromycin 500 mg Intravenous Q24H   clopidogrel 75 mg Oral Daily   diazePAM 2.5 mg Oral Q12H   diltiaZEM 20 mg Intravenous Once   heparin (porcine) 5,000 Units Subcutaneous Q12H   insulin aspart 0-9 Units Subcutaneous 4x Daily AC & at Bedtime   lactobacillus acidophilus 1 capsule Oral Daily   magnesium oxide 400 mg Oral Daily   metoprolol tartrate 25 mg Oral Q12H   sennosides-docusate sodium 2 tablet Oral Nightly          Assessment:  1. Atrial fibrillation with controlled ventricular rate, pauses noted to be as high as 2.6 seconds during sleep  2. Coronary artery disease, status post status post PCI BENTLEY to the LAD and diagonal branch in December 2016 and staged intervention PCI BENTLEY to the RCA in January 2017.  Left heart catheter on 8/13/2018 revealing  mild obstructive coronary artery disease with patent stent of the LAD and mild in-stent restenosis of the proximal right coronary  artery stent and patent are PDA stent.   3. Hyperlipidemia      Recommendations:  1. CHADsVASc is at least 5 for HTN, DM CAD, age and sex.  Risks and benefits of anticoagulation therapy discussed with the patient.  In the past she has complained of falls, however she informs me today that she has not fallen for more than six months.  She remains concerned regarding her bleeding risk with triple therapy.  Given that she had stents > 1 year, I will discontinue the aspirin and add Eliquis for stroke prevention.  Discontinue heparin.    2. Decreased Metoprolol to 25 mg BID due to pauses.  Will monitor both BP and HR response to decreased dosage.  3. Atorvastatin has been increased.  Will follow LDL as out patient, goal < 70.     I discussed the patients findings and my recommendations with patient and family    I have discussed the patient's presenting symptoms, past medical history, physical exam with Dr. Jason Ortega.  Together we have developed the plan of care.      Shelli Jaramillo, ASHVIN  08/14/18  4:15 PM    Addendum:

## 2018-08-14 NOTE — PROGRESS NOTES
[facility] HOSPITALIST PROGRESS NOTE     Patient Identification:  Name:  Emily Roberto  Age:  73 y.o.  Sex:  female  :  1945  MRN:  [id number]  Visit Number:  [id number]  Primary Care Provider:  Bakari Glez MD    Length of stay:  3    Subjective:  Patient denies any chest pain, she did report easy fatigability and weakness with activity, she's eating well good bowel movement vital signs stable heart rate is well controlled and on A. Fib.  Limited left heart catheterization noted, 2-D echo noted final report still pending.  Plan of care was discussed today with cardiology nurse practitioner regarding anticoagulation and it will be started today if there is no obvious contraindication.    Chief Complaint: Weakness  ----------------------------------------------------------------------------------------------------------------------  Current Hospital Meds:    aspirin 81 mg Oral Daily   atorvastatin 40 mg Oral Nightly   azithromycin 500 mg Intravenous Q24H   clopidogrel 75 mg Oral Daily   diazePAM 2.5 mg Oral Q12H   diltiaZEM 20 mg Intravenous Once   insulin aspart 0-9 Units Subcutaneous 4x Daily AC & at Bedtime   lactobacillus acidophilus 1 capsule Oral Daily   magnesium oxide 400 mg Oral Daily   metoprolol tartrate 50 mg Oral Q12H   sennosides-docusate sodium 2 tablet Oral Nightly        ----------------------------------------------------------------------------------------------------------------------  Vital Signs:  Temp:  [97.7 °F (36.5 °C)-98.5 °F (36.9 °C)] 98.5 °F (36.9 °C)  Heart Rate:  [49-67] 64  Resp:  [10-20] 20  BP: (105-163)/() 134/76       Tele: Atrial fibrillation rate of 62 bpm  1    18  0400 18  0402 18  0400   Weight: 92.5 kg (203 lb 14.4 oz) 91 kg (200 lb 9.6 oz) 91.9 kg (202 lb 8 oz)     Body mass index is 37.04 kg/m².    Intake/Output Summary (Last 24 hours) at 18 0909  Last data filed at 18 0829   Gross per 24 hour    Intake           489.64 ml   Output              625 ml   Net          -135.36 ml     Diet Regular; Cardiac  ----------------------------------------------------------------------------------------------------------------------  Physical exam:  General: Comfortable,awake, alert, oriented to self, place, and time, well-developed and well-nourished.  No respiratory distress.    Skin:  Skin is warm and dry. No rash noted. No pallor.    HENT:  Head:  Normocephalic and atraumatic.  Mouth:  Moist mucous membranes.    Eyes:  Conjunctivae and EOM are normal.  Pupils are equal, round, and reactive to light.  No scleral icterus.    Neck:  Neck supple.  No JVD present.    Pulmonary/Chest:  No respiratory distress, no wheezes, no crackles, with normal breath sounds and good air movement.  She has a dressing on her midline along the thoracic area in between the scapular area from an apparent cyst surgery.  Cardiovascular:  Normal rate, irregular irregular rhythm, no obvious murmur noted, no rub.  Abdominal:  Soft.  Bowel sounds are normal.  No distension and no tenderness.   Extremities:  No edema, no tenderness, and no deformity.  No red or swollen joints anywhere.  Strong pulses in all 4 extremities with no clubbing, no cyanosis, no edema.  Neurological:  Motor strength equal no obvious deficit, sensory grossly intact.   No cranial nerve deficit.  No tongue deviation.  No facial droop.  No slurred speech.      ----------------------------------------------------------------------------------------------------------------------  ----------------------------------------------------------------------------------------------------------------------    Results from last 7 days  Lab Units 08/13/18  0044 08/12/18  1914 08/12/18  1254  08/12/18  0047   CK TOTAL U/L  --   --   --   --  733*   CKMB ng/mL  --   --   --   --  8.96*   CK MB INDEX %  --   --   --   --  1.2   TROPONIN I ng/mL 0.673* 0.904* 1.213*  < > 2.390*   < > = values  in this interval not displayed.    Results from last 7 days  Lab Units 08/14/18  0140 08/12/18  0047 08/11/18  1747 08/11/18  1622 08/11/18  1416   CRP mg/dL  --   --  1.10*  --   --    WBC 10*3/mm3 6.58 10.14  --   --  9.22   HEMOGLOBIN g/dL 11.2* 12.9  --   --  13.2   HEMATOCRIT % 32.7* 37.9  --   --  38.5   MCV fL 92.6 90.7  --   --  91.7   MCHC g/dL 34.3 34.0  --   --  34.3   PLATELETS 10*3/mm3 146 146  --   --  147   INR   --   --   --  1.29*  --        Results from last 7 days  Lab Units 08/11/18  1404   PH, ARTERIAL pH units 7.356   PO2 ART mm Hg 57.6*   PCO2, ARTERIAL mm Hg 46.6*   HCO3 ART mmol/L 25.5       Results from last 7 days  Lab Units 08/14/18  0140 08/13/18  0546 08/12/18  0047 08/11/18  1416   SODIUM mmol/L 139 140 139 140   POTASSIUM mmol/L 3.6 3.1* 3.4* 3.7   MAGNESIUM mg/dL  --  1.6*  --   --    CHLORIDE mmol/L 109 107 107 104   CO2 mmol/L 26.1 26.7 28.0 26.9   BUN mg/dL 11 12 17 20   CREATININE mg/dL 0.63 0.65 0.88 0.97   EGFR IF NONAFRICN AM mL/min/1.73 93 90 63 56*   CALCIUM mg/dL 8.4 8.5 9.1 9.5   GLUCOSE mg/dL 104 100 126* 134*   ALBUMIN g/dL  --  3.20*  --  4.20   BILIRUBIN mg/dL  --  1.0  --  1.0   ALK PHOS U/L  --  59  --  60   AST (SGOT) U/L  --  34*  --  72*   ALT (SGPT) U/L  --  26  --  48*   Estimated Creatinine Clearance: 67 mL/min (by C-G formula based on SCr of 0.63 mg/dL).    No results found for: AMMONIA    Results from last 7 days  Lab Units 08/11/18  1416   CHOLESTEROL mg/dL 158   TRIGLYCERIDES mg/dL 103   HDL CHOL mg/dL 36*   LDL CHOL mg/dL 101*     Blood Culture   Date Value Ref Range Status   08/11/2018 No growth at 2 days  Preliminary   08/11/2018 No growth at 2 days  Preliminary                I have personally looked at the labs and they are summarized above.  ----------------------------------------------------------------------------------------------------------------------  Imaging Results (last 24 hours)     ** No results found for the last 24 hours. **         ----------------------------------------------------------------------------------------------------------------------  Assessment and Plan:  - Acute non-ST elevation MI status post left heart catheterization, no critical stenosis treated medically.  - Atrial fibrillation rate controlled, preliminary [id number] score of 4   - History of coronary disease status post stent placement on LAD and RCA as well as posterior descending artery.  - Essential hypertension  - Left basal pneumonia  - Acute metabolic encephalopathy, now resolved    Cardiology service to assess chronic anticoagulation if no contraindication, patient reports she has frequent falls at home which is off concern if she is on anticoagulation, cardiology will decide today as discussed.  PT OT will be requested, repeat x-ray the chest will be ordered, continue Zithromax.  The meantime she'll be placed on DVT prophylaxis until cardiology decide if the patient requires full dose anticoagulation for her atrial fibrillation.    Marycarmen Coronel MD  08/14/18  9:09 AM

## 2018-08-15 ENCOUNTER — APPOINTMENT (OUTPATIENT)
Dept: GENERAL RADIOLOGY | Facility: HOSPITAL | Age: 73
End: 2018-08-15

## 2018-08-15 VITALS
DIASTOLIC BLOOD PRESSURE: 79 MMHG | HEART RATE: 58 BPM | WEIGHT: 202.5 LBS | TEMPERATURE: 99 F | OXYGEN SATURATION: 98 % | HEIGHT: 62 IN | BODY MASS INDEX: 37.26 KG/M2 | SYSTOLIC BLOOD PRESSURE: 164 MMHG | RESPIRATION RATE: 20 BRPM

## 2018-08-15 LAB
GLUCOSE BLDC GLUCOMTR-MCNC: 125 MG/DL (ref 70–130)
GLUCOSE BLDC GLUCOMTR-MCNC: 91 MG/DL (ref 70–130)

## 2018-08-15 PROCEDURE — 99239 HOSP IP/OBS DSCHRG MGMT >30: CPT | Performed by: HOSPITALIST

## 2018-08-15 PROCEDURE — 71046 X-RAY EXAM CHEST 2 VIEWS: CPT

## 2018-08-15 PROCEDURE — 71046 X-RAY EXAM CHEST 2 VIEWS: CPT | Performed by: RADIOLOGY

## 2018-08-15 PROCEDURE — 82962 GLUCOSE BLOOD TEST: CPT

## 2018-08-15 RX ORDER — ATORVASTATIN CALCIUM 20 MG/1
40 TABLET, FILM COATED ORAL NIGHTLY
Qty: 30 TABLET | Refills: 11 | Status: SHIPPED | OUTPATIENT
Start: 2018-08-15

## 2018-08-15 RX ADMIN — DIAZEPAM 2.5 MG: 5 TABLET ORAL at 10:00

## 2018-08-15 RX ADMIN — APIXABAN 5 MG: 5 TABLET, FILM COATED ORAL at 09:00

## 2018-08-15 RX ADMIN — Medication 1 CAPSULE: at 09:00

## 2018-08-15 RX ADMIN — MAGNESIUM GLUCONATE 500 MG ORAL TABLET 400 MG: 500 TABLET ORAL at 09:00

## 2018-08-15 RX ADMIN — CLOPIDOGREL 75 MG: 75 TABLET, FILM COATED ORAL at 09:00

## 2018-08-15 NOTE — PLAN OF CARE
Problem: Fall Risk (Adult)  Goal: Absence of Fall  Outcome: Outcome(s) achieved Date Met: 08/15/18      Problem: Patient Care Overview  Goal: Plan of Care Review  Outcome: Ongoing (interventions implemented as appropriate)    Goal: Individualization and Mutuality  Outcome: Ongoing (interventions implemented as appropriate)    Goal: Discharge Needs Assessment  Outcome: Ongoing (interventions implemented as appropriate)    Goal: Interprofessional Rounds/Family Conf  Outcome: Ongoing (interventions implemented as appropriate)      Problem: Cardiac: ACS (Acute Coronary Syndrome) (Adult)  Goal: Signs and Symptoms of Listed Potential Problems Will be Absent, Minimized or Managed (Cardiac: ACS)  Outcome: Ongoing (interventions implemented as appropriate)      Problem: Skin Injury Risk (Adult)  Goal: Skin Health and Integrity  Outcome: Ongoing (interventions implemented as appropriate)      Problem: Cardiac Catheterization (Diagnostic/Interventional) (Adult)  Goal: Signs and Symptoms of Listed Potential Problems Will be Absent, Minimized or Managed (Cardiac Catheterization)  Outcome: Outcome(s) achieved Date Met: 08/15/18    Goal: Anesthesia/Sedation Recovery  Outcome: Outcome(s) achieved Date Met: 08/15/18

## 2018-08-15 NOTE — DISCHARGE SUMMARY
[facility] HOSPITALIST MEDICINE DISCHARGE SUMMARY    Patient Identification:  Name:  Emily Roberto  Age:  73 y.o.  Sex:  female  :  1945  MRN:  [id number]  Visit Number:  [id number]    Date of Admission: 2018  Date of Discharge:  8/15/2018   DISCHARGE DISPOSITION   Stable  PCP: Bakari Glez MD    DISCHARGE DIAGNOSIS : Non-ST elevation MI treated medically, mild obstructive coronary disease with patent stent of LAD and mild in-stent restenosis of proximal RCA, patent PDA stent on this admission's left heart catheterization, new onset atrial fibrillation rate controlled, hyperlipidemia, left basal infiltrate versus atelectasis, essential hypertension, status post PCI BENTLEY on LAD and a diagonal branch on  and PCI BENTLEY to the RCA on 2017.  Acute metabolic encephalopathy most likely from non-ST elevation MI and medication, diabetes type 2 non-insulin-requiring, chronic pain syndrome on chronic hydrocortisone use, chronic anxiety on Valium, history of COPD.  Obesity with a BMI of 37.      HOSPITAL COURSE  Patient is a 73 y.o. female presented to [facility] complaining of confusion and not feeling well for the past 2 days.  Please see the admitting history and physical for further details.  At the emergency room she was noted to have atrial fibrillation which is new onset but rate controlled, her troponin was elevated with no ST elevation.  X-ray shows atelectasis versus left basal infiltrate.  She was afebrile her white count was not elevated, nonetheless because of her confusion she was treated empirically with antibiotic.  She was placed on heparin drip and non-ST elevation protocol.  Patient had diagnostic left heart catheterization on 2018 and findings as noted above.  2-D echo shows moderate pulmonary hypertension moderate tricuspid valve stenosis ejection fraction is 51-55% and also hypokinetic wall segments were noted including apical  anterior lateral inferior septal and apex.  After the left heart catheterization, cardiology recommended optimization of her medication, increase Lipitor as well as her Lopressor to 50 mg twice a day.  Unfortunately she developed bradycardia and possible 2.6 seconds while she was sleeping hence her Lopressor was dropped to 25 mg twice a day.  Because of her [id number] Vasc score of at least 4, she was placed on stroke prevention which the patient is agreeable.  She was counseled regarding potential side effects of anticoagulation including bleeding and she is to stop Eliquis immediately if she has any rectal bleed hematemesis coffee-ground stools and report her doctor immediately.  Her entire stay was uneventful she is ambulating back to her baseline activity no chest pain heart rate is well controlled, afebrile cultures wouldn't nonyielding.  She has been cleared by cardiology service for discharge and recommended to follow up with cardiology in one week's time.   VITAL SIGNS:  1    08/12/18  0400 08/13/18  0402 08/14/18  0400   Weight: 92.5 kg (203 lb 14.4 oz) 91 kg (200 lb 9.6 oz) 91.9 kg (202 lb 8 oz)     Body mass index is 37.04 kg/m².  Vitals:    08/15/18 0700   BP: 164/79   Pulse: 58   Resp: 20   Temp: 99 °F (37.2 °C)   SpO2: 98%     PHYSICAL EXAM:  General: Comfortable,awake, alert, oriented to self, place, and time, well-developed and well-nourished.  No respiratory distress.    Skin:  Skin is warm and dry. No rash noted. No pallor.    HENT:  Head:  Normocephalic and atraumatic.  Mouth:  Moist mucous membranes.    Eyes:  Conjunctivae and EOM are normal.  Pupils are equal, round, and reactive to light.  No scleral icterus.    Neck:  Neck supple.  No JVD present.    Pulmonary/Chest:  No respiratory distress, no wheezes, no crackles, with normal breath sounds and good air movement.  Cardiovascular:  Normal rate, irregular irregular rhythm, normal heart sounds with no murmur.  Abdominal:  Soft.  Bowel sounds are  normal.  No distension and no tenderness.  Obese.    Extremities:  No edema, no tenderness, and no deformity.  No red or swollen joints anywhere.  Strong pulses in all 4 extremities with no clubbing, no cyanosis, no edema.  Neurological:  Motor strength equal no obvious deficit, sensory grossly intact.   No cranial nerve deficit.  No tongue deviation.  No facial droop.  No slurred speech.    ----------    DISCHARGE MEDICATIONS:     Discharge Medications      New Medications      Instructions Start Date   ELIQUIS 5 MG tablet tablet  Generic drug:  apixaban   5 mg, Oral, Every 12 Hours Scheduled         Changes to Medications      Instructions Start Date   atorvastatin 20 MG tablet  Commonly known as:  LIPITOR  What changed:  how much to take   40 mg, Oral, Nightly         Continue These Medications      Instructions Start Date   albuterol 108 (90 Base) MCG/ACT inhaler  Commonly known as:  PROAIR HFA   2 puffs, Inhalation, Every 4 Hours PRN      amLODIPine 5 MG tablet  Commonly known as:  NORVASC   5 mg, Oral, Daily      clopidogrel 75 MG tablet  Commonly known as:  PLAVIX   75 mg, Oral, Daily      diazePAM 5 MG tablet  Commonly known as:  VALIUM   5 mg, Oral, 2 Times Daily PRN      gabapentin 800 MG tablet  Commonly known as:  NEURONTIN   800 mg, Oral, 3 Times Daily      HYDROcodone-acetaminophen  MG per tablet  Commonly known as:  NORCO   1 tablet, Oral, 3 Times Daily PRN      metFORMIN 500 MG tablet  Commonly known as:  GLUCOPHAGE   500 mg, Oral, 2 Times Daily With Meals      metoprolol tartrate 25 MG tablet  Commonly known as:  LOPRESSOR   25 mg, Oral, 2 Times Daily      vitamin D 87500 units capsule capsule  Commonly known as:  ERGOCALCIFEROL   50,000 Units, Oral, Every 7 Days, Patient takes on friday         Stop These Medications    aspirin 81 MG EC tablet     isosorbide mononitrate 30 MG 24 hr tablet  Commonly known as:  IMDUR            Diet Instructions     Cardiac Diet               Your Scheduled  Appointments    Sep 05, 2018 10:20 AM EDT  Follow Up with JESSE Potter MD  [facility] (--) [address]  [facility] [id number] [phone]   Arrive 15 minutes prior to appointment.   Oct 16, 2018 10:00 AM EDT  Follow Up with ASHVIN House  [facility] CARDIOLOGY (--) [address]  [facility] [address] [phone]   Arrive 15 minutes prior to appointment.    Additional instructions:      Pt  Has  An  Apt  With  Bakari Glez  For  Aug  22  At  3:30                  Activity Instructions     Activity as Tolerated               Additional Instructions for the Follow-ups that You Need to Schedule     Discharge Follow-up with PCP    As directed      Currently Documented PCP:  Bakari Glez MD  PCP Phone Number:  [phone]    Follow Up Details:  Dr. Bakari Glez         Discharge Follow-up with Specified Provider: Dr. Peralta (Cardiology)    As directed      To:  Dr. Peralta (Cardiology)    Follow Up Details:  8/22/18           Follow-up Information     Bakari Glez MD .    Specialty:  Family Medicine  Why:  Dr. Bakari Glez  Contact information:  [address]  [facility] [address]  [facility] [address] [phone]                   Marycarmen Coronel MD  08/15/18  11:42 AM    Please note that this discharge summary required more than 30 minutes to complete.

## 2018-08-15 NOTE — PROGRESS NOTES
Pharmacy was asked to look at the cost of patient's Eliquis.  The monthly co-pay will be $8.35.  A free trial card will be given to the patient at discharge for the first month.     Nanci Mishra [facility]  08/15/18  7:40 AM

## 2018-08-15 NOTE — PROGRESS NOTES
Discharge Planning Assessment  SHANNAN Osman     Patient Name: Emily Roberto  MRN: [id number]  Today's Date: 8/15/2018    Admit Date: 8/11/2018      Discharge Plan     Row Name 08/15/18 1034       Plan    Final Discharge Disposition Code 01 - home or self-care    Final Note Pt to be discharged home on this date,             Josie Hernandez

## 2018-08-15 NOTE — PLAN OF CARE
Problem: Patient Care Overview  Goal: Plan of Care Review  Outcome: Ongoing (interventions implemented as appropriate)    Goal: Individualization and Mutuality  Outcome: Ongoing (interventions implemented as appropriate)      Problem: Cardiac: ACS (Acute Coronary Syndrome) (Adult)  Goal: Signs and Symptoms of Listed Potential Problems Will be Absent, Minimized or Managed (Cardiac: ACS)  Outcome: Ongoing (interventions implemented as appropriate)      Problem: Skin Injury Risk (Adult)  Goal: Skin Health and Integrity  Outcome: Ongoing (interventions implemented as appropriate)

## 2018-08-16 LAB
BACTERIA SPEC AEROBE CULT: NORMAL
BACTERIA SPEC AEROBE CULT: NORMAL

## 2018-08-22 ENCOUNTER — OFFICE VISIT (OUTPATIENT)
Dept: CARDIOLOGY | Facility: CLINIC | Age: 73
End: 2018-08-22

## 2018-08-22 VITALS
BODY MASS INDEX: 33.57 KG/M2 | HEIGHT: 62 IN | HEART RATE: 97 BPM | DIASTOLIC BLOOD PRESSURE: 92 MMHG | WEIGHT: 182.4 LBS | OXYGEN SATURATION: 98 % | SYSTOLIC BLOOD PRESSURE: 149 MMHG

## 2018-08-22 DIAGNOSIS — E11.59 TYPE 2 DIABETES MELLITUS WITH OTHER CIRCULATORY COMPLICATION, WITHOUT LONG-TERM CURRENT USE OF INSULIN (HCC): ICD-10-CM

## 2018-08-22 DIAGNOSIS — E66.9 OBESITY, CLASS II, BMI 35-39.9: ICD-10-CM

## 2018-08-22 DIAGNOSIS — I21.4 NSTEMI (NON-ST ELEVATED MYOCARDIAL INFARCTION) (HCC): ICD-10-CM

## 2018-08-22 DIAGNOSIS — I25.118 CORONARY ARTERY DISEASE OF NATIVE ARTERY OF NATIVE HEART WITH STABLE ANGINA PECTORIS (HCC): Primary | ICD-10-CM

## 2018-08-22 DIAGNOSIS — E78.2 MIXED HYPERLIPIDEMIA: ICD-10-CM

## 2018-08-22 DIAGNOSIS — J96.21 ACUTE ON CHRONIC RESPIRATORY FAILURE WITH HYPOXIA AND HYPERCAPNIA (HCC): ICD-10-CM

## 2018-08-22 DIAGNOSIS — I49.8 BRADYARRHYTHMIA: ICD-10-CM

## 2018-08-22 DIAGNOSIS — J96.22 ACUTE ON CHRONIC RESPIRATORY FAILURE WITH HYPOXIA AND HYPERCAPNIA (HCC): ICD-10-CM

## 2018-08-22 DIAGNOSIS — I10 ESSENTIAL HYPERTENSION: ICD-10-CM

## 2018-08-22 PROBLEM — I48.19 PERSISTENT ATRIAL FIBRILLATION (HCC): Status: ACTIVE | Noted: 2018-08-22

## 2018-08-22 PROBLEM — I20.9 ANGINA, CLASS II (HCC): Status: ACTIVE | Noted: 2018-08-22

## 2018-08-22 PROCEDURE — 93005 ELECTROCARDIOGRAM TRACING: CPT | Performed by: INTERNAL MEDICINE

## 2018-08-22 PROCEDURE — 99214 OFFICE O/P EST MOD 30 MIN: CPT | Performed by: INTERNAL MEDICINE

## 2018-08-22 RX ORDER — ISOSORBIDE MONONITRATE 30 MG/1
30 TABLET, EXTENDED RELEASE ORAL DAILY
Qty: 30 TABLET | Refills: 11 | Status: SHIPPED | OUTPATIENT
Start: 2018-08-22

## 2018-08-22 NOTE — PROGRESS NOTES
[facility] CARDIOLOGY  [address] [facility] [address]  [place] [address]  Phone: [phone]  Fax: [phone]    08/22/2018    Chief Complaint   Patient presents with   • NSTEMI   • Coronary Artery Disease   • Hypertension   • Hyperlipidemia   • Fatigue        History:   Emily Roberto is a 73 y.o. female seen in followup, She was recently admitted to the hospital for hypoxic respiratory failure, A. Fib with RVR, she also had a troponin elevation and had cardiac catheterization procedure done which showed mild to 30% in-stent restenosis in the proximal RCA, mild Obstructive Coronary Artery Disease Elsewhere, Her LAD and Right PDA Stents Were Patent.  She Subsequently Got Treated for Pneumonia, She Recovered from Her Hypoxic Respiratory Failure, Her A. Fib Was New Onset, She Had a Diagnosed Just before Hospitalization While She Had Surgery on Her Back.  Since Her CHADS-VASc Core Was High She Was Initiated on Eliquis along with the Plavix for Her History of CAD and PCI.  Today in the Office That She Was Clinically Stable, She Is Not Using Anymore Home Oxygen, I Did Evaluate Her Ambulatory Pulse Ox on Heart Rate, Her Pulse Ox Saturation Remained Greater Than 95% on Walking for More Than 6 Minutes, Her Heart Rate Stayed within 80 Bpm.  She Did Not Have Any Significant Dyspnea on Ambulation.  She Was on Imdur 30 Mg Daily before She Got Hospitalized and That Medication Was Discontinued on Her Discharge, She Does Have a Stable Ischemic Heart Disease and Stable CCS Class II Angina    Past Medical History:   Diagnosis Date   • CAD (coronary artery disease), native coronary artery 12/3/2016   • Centrilobular emphysema (CMS/HCC) 12/8/2016   • Diabetes (CMS/[facility])    • Hypercholesteremia    • Hypertension    • Osteoporosis        Past Surgical History:   Procedure Laterality Date   • CARDIAC CATHETERIZATION N/A 12/1/2016    Procedure: Left Heart Cath;  Surgeon: Ntae Butler MD;  Location: [facility]  INVASIVE LOCATION;  Service:    • CARDIAC CATHETERIZATION N/A 12/5/2016    Procedure: Left Heart Cath;  Surgeon: Alyssa Montalvo MD;  Location:  [facility] CATH INVASIVE LOCATION;  Service:    • CARDIAC CATHETERIZATION N/A 1/11/2017    Procedure: Left Heart Cath/STENT RCA;  Surgeon: Nate Butler MD;  Location:  [facility] CATH INVASIVE LOCATION;  Service:    • CARDIAC CATHETERIZATION N/A 8/13/2018    Procedure: Left Heart Cath;  Surgeon: Shaheen Purcell MD;  Location:  [facility] CATH INVASIVE LOCATION;  Service: Cardiology   • CATARACT EXTRACTION     • CHOLECYSTECTOMY     • CYST REMOVAL          Past Social History:  Social History     Social History   • Marital status:      Social History Main Topics   • Smoking status: Current Some Day Smoker     Packs/day: 1.00     Years: 10.00     Types: Cigarettes     Last attempt to quit: 12/12/2016   • Smokeless tobacco: Never Used   • Alcohol use No   • Drug use: No   • Sexual activity: Defer     Other Topics Concern   • Not on file       Past Family History:  Family History   Problem Relation Age of Onset   • Hypertension Mother    • Heart disease Mother    • Diabetes Mother    • Hypertension Father    • Heart disease Father    • Diabetes Father    • Cancer Sister    • Heart disease Sister    • Hypertension Sister        Review of Systems:   Review of Systems   Constitution: Positive for weakness.   Cardiovascular: Positive for dyspnea on exertion.   Neurological: Positive for dizziness and light-headedness.         Current Outpatient Prescriptions   Medication Sig Dispense Refill   • albuterol (PROAIR HFA) 108 (90 BASE) MCG/ACT inhaler Inhale 2 puffs Every 4 (Four) Hours As Needed for Wheezing. 1 inhaler 6   • amLODIPine (NORVASC) 5 MG tablet Take 5 mg by mouth Daily.     • apixaban (ELIQUIS) 5 MG tablet tablet Take 1 tablet by mouth Every 12 (Twelve) Hours. 60 tablet 1   • atorvastatin (LIPITOR) 20 MG tablet Take 2 tablets by mouth Every Night. 30 tablet 11   •  clopidogrel (PLAVIX) 75 MG tablet Take 1 tablet by mouth Daily. 30 tablet 11   • diazePAM (VALIUM) 5 MG tablet Take 5 mg by mouth 2 (Two) Times a Day As Needed for Anxiety.     • gabapentin (NEURONTIN) 800 MG tablet Take 800 mg by mouth 3 (Three) Times a Day.     • HYDROcodone-acetaminophen (NORCO)  MG per tablet Take 1 tablet by mouth 3 (Three) Times a Day As Needed for Moderate Pain .     • metFORMIN (GLUCOPHAGE) 500 MG tablet Take 500 mg by mouth 2 (Two) Times a Day With Meals.     • metoprolol tartrate (LOPRESSOR) 25 MG tablet Take 1 tablet by mouth 2 (Two) Times a Day. 60 tablet 11   • vitamin D (ERGOCALCIFEROL) 49873 UNITS capsule capsule Take 50,000 Units by mouth Every 7 (Seven) Days. Patient takes on friday       No current facility-administered medications for this visit.         No Known Allergies    Objective     There were no vitals taken for this visit.    Physical Exam   Constitutional: She is oriented to person, place, and time. She appears well-developed.   HENT:   Head: Normocephalic.   Eyes: Pupils are equal, round, and reactive to light.   Neck: Normal range of motion.   Cardiovascular: Normal rate.  An irregular rhythm present.   Murmur heard.  Pulmonary/Chest: Effort normal and breath sounds normal.   Musculoskeletal: Normal range of motion.   Neurological: She is alert and oriented to person, place, and time.   Skin: Skin is warm.       DATA:      Results for orders placed during the hospital encounter of 08/11/18   Adult Transthoracic Echo Complete W/ Cont if Necessary Per Protocol    Narrative · Normal left ventricular cavity size and wall thickness noted. All left   ventricular wall segments contract normally.  · The following left ventricular wall segments are hypokinetic: apical   anterior, apical lateral, apical inferior, apical septal and apex   hypokinetic.  · Estimated EF appears to be in the range of 51 - 55%.  · The aortic valve is not well visualized. The aortic valve is  abnormal in   structure. The valve exhibits sclerosis. No aortic valve regurgitation is   present. No hemodynamically significant aortic valve stenosis is present.  · The mitral valve is normal in structure. Mild mitral valve regurgitation   is present. No significant mitral valve stenosis is present.  · The tricuspid valve is normal. Moderate tricuspid valve stenosis is   present. Mild tricuspid valve regurgitation is present. Estimated right   ventricular systolic pressure from tricuspid regurgitation is moderately   elevated (45-55 mmHg).  · Moderate pulmonary hypertension is present.  · There is no evidence of pericardial effusion.             Results for orders placed during the hospital encounter of 08/11/18   Cardiac Catheterization/Vascular Study    Addendum CARDIAC CATHETERIZATION / INTERVENTION REPORT   DATE OF PROCEDURE: 8/13/2018   INDICATION FOR PROCEDURE: NSTEMI    Preprocedure diagnosis: Non-ST elevation myocardial infarction. Atrial fibrillation. Diabetes mellitus type II. Hypertension.   Postprocedure diagnosis: Mild nonobstructive coronary artery disease.  Indications: Patient is 73-year-old female with known history of CAD, prior LAD and RCA  stent, presenting with dyspnea, troponin evaluation at 2, trending down,.   This procedure was done for evaluation of her troponin elevation and  non-ST elevation myocardial infarction.   PROCEDURE PERFORMED:   1. Selective right and left Coronary Angiogram 2. R femoral arteriotomy closure with Mynx  device.   PROCEDURE COMMENTS:  Emily Roberto was brought to the cath lab and placed on the cardiac  catherization table in the postabsortive state. The patient was prepped  and draped according to the cath lab protocol under strict aseptic and  sterile condition.  The patient's right radial arterial site posterior  than the usual sterile fashion.  IV Versed and fentanyl was used for  conscious sedation.  We used a micropuncture needle to access the right   radial artery operation chest sheath was introduced in place.  5 Marshallese  JL4 catheter was used for left coronary angiogram.  Due to a patient's  ostial stent to the right coronary artery we had challenge and in engaging  the right coronary artery to radial approach, we tried multiple catheters  including a 5 Marshallese JR 4, multipurpose, AR mod 1, and multipurpose  catheter.  We then decided to go through the right femoral groin access to  engage the right coronary artery.    Patient's right femoral artery sight was prepped and draped. Under  fluoroscopic guidance the right femoral artery was punctured using a  micropuncture needle utilizing the modified Seldinger technique. 6 Beninese  sheath was introduced over a wire. After aspirating for blood it was  flushed with heparinized saline.  We then used a 6 Marshallese JR4 diagnostic  catheter to engage the right coronary angiogram and adequate pictures were  taken in orthogonal projections.  After completion of the procedure the femoral sheath was removed and a 6  Marshallese Mynx  closure device was used to achieve immediate and complete  hemostasis. The patient tolerated the procedure without complications.      FINDINGS:    CORONARY ANGIOGRAPHY:     Left main: The left main coronary artery is a moderate caliber vessel, no  significant stenosis noted.   LAD: The left anterior listening artery is a moderate to vessel  proximally, there was a stent to the proximal portion which was widely  patent, the mid and distal LAD had mild diffuse disease.  The diagonal 1  and 2 arteries were small to moderate caliber vessel with mild diffuse  disease.  Ramus branch: Moderate calibre vessel with mild diffuse disease  Left circumflex:  The left circumflex artery is a moderate caliber vessel  with mild diffuse disease, 2 small caliber OM arteries, the AV groove  branch is a moderate calibre vessel with mild diffuse disease.   Right coronary artery: The right coronary artery is a large  dominant artery, there was a stent to  the proximal RCA extending all the way to the ostium.  There was mild to  30% in-stent restenosis, the mid RCA had 40-50% stenosis, the distal RCA  had mild diffuse disease,  PDA proximal stent was widely patent, mild to  moderate diffuse disease in a small caliber vessel distally, the right PLB  branch is a small to moderate vessel with mild diffuse disease.   Final impression:  Mild obstructive coronary artery disease, with patent stent of the LAD and  mild in-stent restenosis proximal right coronary artery stent and patent R  PDA stent.      RECOMMENDATIONS:  Medical management for CAD.   Continue with the current dual antiplatelet medication with aspirin and  Plavix.   Shaheen Purcell MD 08/13/18 3:33 PM      Shaheen Purcell MD 8/14/2018  2:56 PM          Narrative CARDIAC CATHETERIZATION / INTERVENTION REPORT     DATE OF PROCEDURE: 8/13/2018     INDICATION FOR PROCEDURE: NSTEMI       Preprocedure diagnosis:  Non-ST elevation myocardial infarction.  Atrial fibrillation.  Diabetes mellitus type II.  Hypertension.      Postprocedure diagnosis:  Mild nonobstructive coronary artery disease.    Indications:  Patient is 73-year-old female with known history of CAD, prior LAD and RCA   stent, presenting with dyspnea, troponin evaluation at 2, trending down,.    This procedure was done for evaluation of her troponin elevation and   non-ST elevation myocardial infarction.      PROCEDURE PERFORMED:     1. Selective right and left Coronary Angiogram  2. R femoral arteriotomy closure with Mynx  device.     PROCEDURE COMMENTS:    Emily Roberto was brought to the cath lab and placed on the cardiac   catherization table in the postabsortive state. The patient was prepped   and draped according to the cath lab protocol under strict aseptic and   sterile condition.  The patient's right radial arterial site posterior   than the usual sterile fashion.  IV  Versed and fentanyl was used for   conscious sedation.  We used a micropuncture needle to access the right   radial artery operation chest sheath was introduced in place.  5 Uzbek   JL4 catheter was used for left coronary angiogram.  Due to a patient's   ostial stent to the right coronary artery we have challenge and in   engaging the right coronary artery to radial approach, we tried multiple   catheters including a 5 Uzbek JR 4, multipurpose, AR mod 1, and check a   catheter.  We then decided to go through the right femoral groin access to   engage the right coronary artery.        Patient's right femoral artery sight was prepped and draped. Under   fluoroscopic guidance the right femoral artery was punctured using a   micropuncture needle utilizing the modified Seldinger technique. 6 Mohawk   sheath was introduced over a wire. After aspirating for blood it was   flushed with heparinized saline.  We then used a 6 Uzbek JR4 diagnostic   catheter to engage the right coronary angiogram and adequate pictures were   taken in orthogonal projections.    After completion of the procedure the femoral sheath was removed and a 6   Uzbek EVAN prescription device was used to achieve immediate and complete   hemostasis. The patient tolerated the procedure without complications.         FINDINGS:       CORONARY ANGIOGRAPHY:        Left main: The left main coronary artery is a moderate caliber vessel, no   significant stenosis noted.     LAD: The left anterior listening artery is a moderate to vessel   proximally, there was a stent to the proximal portion which was widely   patent, the mid and distal LAD had mild diffuse disease.  The diagonal 1   and 2 arteries were small to moderate caliber vessel with mild diffuse   disease.    Left circumflex:  The left circumflex artery is a moderate caliber vessel   with mild diffuse disease, 2 small caliber OM arteries, the AV groove   branch is kind of small to moderate tenderness with  mild diffuse disease.      Right coronary artery:  The right coronary artery is a large dominant artery, there was a stent to   the proximal RCA extending all the way to the ostium.  There was mild to   30% in-stent restenosis, the mid RCA had 40-50% stenosis, the distal RCA   had mild diffuse disease, moderate PDA stent was widely patent, mild to   moderate diffuse disease and a small caliber vessel distally, the right   LDL branch is a small to moderate vessel with mild diffuse disease.     Final impression:   Mild are not artery to coronary artery disease, with patent stent of the   LAD and mild in-stent restenosis proximal right coronary artery stent.         RECOMMENDATIONS:   Medical management for CAD.    Continue with the current dual antiplatelet medication with aspirin and   Plavix.      Shaheen Nando Purcell MD  08/13/18  3:33 PM     Procedures             Assessment:  CAD status post LAD, proximal RCA, right PDA PCI in the past, recent heart catheterization showed a mild in-stent restenosis in the proximal RCA, patent proximal LAD and right PDA stents, no other significant obstructive stenosis noted.  Stable ischemic heart disease.  Persistent atrial fibrillation with CHADS-VASc score of 5 on eliquis for oral anticoagulation, currently heart rate well controlled on medication  COPD  Dyslipidemia  Obesity      Plan:  48-hour Holter monitor, she did had a brief 2.6 second pause during her hospital stay on telemetry, her metoprolol dose was decreased to 25 mg twice a day, this needs reevaluation.  Resume Imdur 30 mg by mouth daily.  Continue with her other current medications.  Follow-up in 3 months      Recommended increase activity to 30 minutes of walking daily, most days of the week.  Discussed diet and weight loss with patient.      Patient's Body mass index is 33.36 kg/m². BMI is above normal parameters. Recommendations include: educational material, exercise counseling and nutrition  counseling.        Thank you for allowing me to participate in the care of Emily Roberto. Feel free to contact me directly with any further questions or concerns.          Shaheen Purcell MD, FACC  Interventional Cardiology

## 2018-09-05 ENCOUNTER — OFFICE VISIT (OUTPATIENT)
Dept: PULMONOLOGY | Facility: CLINIC | Age: 73
End: 2018-09-05

## 2018-09-05 VITALS
HEART RATE: 72 BPM | WEIGHT: 190.2 LBS | SYSTOLIC BLOOD PRESSURE: 145 MMHG | HEIGHT: 62 IN | TEMPERATURE: 97.4 F | DIASTOLIC BLOOD PRESSURE: 85 MMHG | OXYGEN SATURATION: 92 % | BODY MASS INDEX: 35 KG/M2

## 2018-09-05 DIAGNOSIS — G47.33 OSA (OBSTRUCTIVE SLEEP APNEA): Primary | ICD-10-CM

## 2018-09-05 DIAGNOSIS — E66.9 OBESITY (BMI 30-39.9): ICD-10-CM

## 2018-09-05 DIAGNOSIS — G47.33 OSA (OBSTRUCTIVE SLEEP APNEA): ICD-10-CM

## 2018-09-05 DIAGNOSIS — J43.2 CENTRILOBULAR EMPHYSEMA (HCC): ICD-10-CM

## 2018-09-05 PROCEDURE — 99214 OFFICE O/P EST MOD 30 MIN: CPT | Performed by: INTERNAL MEDICINE

## 2018-09-05 NOTE — PROGRESS NOTES
Subjective   Emily Roberto is a 73 y.o. female who is being seen for Sleep Apnea    History of Present Illness   Patient returns after CPAP titration study.  This patient had an established diagnosis of obstructive sleep apnea, we prescribed AutoPap with a pressure range of 5-15 cm water.  She continued to have daytime fatigue and sleepiness.  Within decided to do a pressure titration study to determine the optimal pressure requirement and also to see whether she would need BiPAP support.  Patient is here today to discuss about the result.  Subjectively she still continues to have the same problem.  Past Medical History:   Diagnosis Date   • CAD (coronary artery disease), native coronary artery 12/3/2016   • Centrilobular emphysema (CMS/HCC) 12/8/2016   • Diabetes (CMS/[facility])    • Hypercholesteremia    • Hypertension    • Osteoporosis      Past Surgical History:   Procedure Laterality Date   • CARDIAC CATHETERIZATION N/A 12/1/2016    Procedure: Left Heart Cath;  Surgeon: Nate Butler MD;  Location:  [facility] CATH INVASIVE LOCATION;  Service:    • CARDIAC CATHETERIZATION N/A 12/5/2016    Procedure: Left Heart Cath;  Surgeon: Alyssa Montalvo MD;  Location:  [facility] CATH INVASIVE LOCATION;  Service:    • CARDIAC CATHETERIZATION N/A 1/11/2017    Procedure: Left Heart Cath/STENT RCA;  Surgeon: Nate Butler MD;  Location:  [facility] CATH INVASIVE LOCATION;  Service:    • CARDIAC CATHETERIZATION N/A 8/13/2018    Procedure: Left Heart Cath;  Surgeon: Shaheen Purcell MD;  Location:  [facility] CATH INVASIVE LOCATION;  Service: Cardiology   • CATARACT EXTRACTION     • CHOLECYSTECTOMY     • CYST REMOVAL       Family History   Problem Relation Age of Onset   • Hypertension Mother    • Heart disease Mother    • Diabetes Mother    • Hypertension Father    • Heart disease Father    • Diabetes Father    • Cancer Sister    • Heart disease Sister    • Hypertension Sister       reports that she has been smoking Cigarettes.   "She has a 10.00 pack-year smoking history. She has never used smokeless tobacco. She reports that she does not drink alcohol or use drugs.  No Known Allergies          Patient has not received the flu and pneumonia vaccinations this year.     The following portions of the patient's history were reviewed and updated as appropriate: allergies, current medications, past family history, past medical history, past social history, past surgical history and problem list.    Review of Systems   Constitutional: Positive for fatigue.   Respiratory: Positive for apnea and shortness of breath.    Cardiovascular: Negative for leg swelling.   Neurological: Positive for headaches.       Objective   /85   Pulse 72   Temp 97.4 °F (36.3 °C)   Ht 157.5 cm (62\")   Wt 86.3 kg (190 lb 3.2 oz)   SpO2 92%   BMI 34.79 kg/m²   Physical Exam   Constitutional: She is oriented to person, place, and time.   HENT:   Head: Normocephalic and atraumatic.   Nose: Mucosal edema present.   Eyes: Pupils are equal, round, and reactive to light. EOM are normal.   Neck: Neck supple.   Cardiovascular: Normal rate, regular rhythm and normal heart sounds.    Pulmonary/Chest: She has rhonchi.   Vesicular breath sound bilaterally with prolonged expiratory phase   Abdominal: Soft. Bowel sounds are normal.   Musculoskeletal: Normal range of motion. She exhibits no deformity.   Neurological: She is alert and oriented to person, place, and time.   Skin: Skin is warm and dry.   Psychiatric: She has a normal mood and affect. Her behavior is normal.   Nursing note and vitals reviewed.        Radiology:  Xr Chest 1 View    Result Date: 8/12/2018  Cardiomegaly and left lower lobe airspace disease with small left effusion.  This report was finalized on 8/12/2018 9:58 AM by Dr. Colten Hauser MD.      Ct Head Without Contrast Stroke Protocol    Result Date: 8/12/2018  Senescent changes. No CT evidence of acute intracranial abnormality.  This report was " finalized on 8/12/2018 9:58 AM by Dr. Colten Hauser MD.      Xr Chest Pa & Lateral    Result Date: 8/15/2018  Stable radiographic appearance of the chest.  This report was finalized on 8/15/2018 9:34 AM by Dr. Zach Alexander MD.        Lab Results:  Admission on 08/11/2018, Discharged on 08/15/2018   No results displayed because visit has over 200 results.          Assessment      ICD-10-CM ICD-9-CM   1. JOSSUE (obstructive sleep apnea) G47.33 327.23   2. Centrilobular emphysema (CMS/HCC) J43.2 492.8                DISCUSSION:  Reviewed the pressure titration study.  Patient needed a 13 cm water delivered through a full facemask. On questioning patient tells me that she slept good and felt better in the morning time.  Reviewing the sonogram it seems her sleep efficiency was 93%.  Based on this I feel that her current rash and should be adequate, currently she is on AutoPap with a pressure range of 5-15 cm water.  I had a good talk with the patient along with the family member.  During this talk she confesses that she was not using her mask properly, she did not like the mask at home and hence was taking it off.  He had another good discussion about the whole issues.  I requested her to keep the mask on as much as possible.  Since she did not have any trouble in the sleep lab she should not have a trouble at home also.  She understood that and problem list to me that she would try her best.  We would reevaluate her again in approximately 3 months from now.    Plan    No orders of the defined types were placed in this encounter.    No orders of the defined types were placed in this encounter.                 Diego Potter MD, FCCP, [facility]  Pulmonary, Critical Care, and Sleep Medicine

## 2018-09-12 ENCOUNTER — TELEPHONE (OUTPATIENT)
Dept: CARDIOLOGY | Facility: CLINIC | Age: 73
End: 2018-09-12

## 2018-09-12 NOTE — TELEPHONE ENCOUNTER
Called patient to let her know that she has an appointment in our office next week however, I had noticed she had not had her Holter Monitor done. I was calling to remind her to just come to the hospital to the  to get registered and then they will send her upstairs to respiratory. She will wear it for 48 hours and then return it.     She is aware and understands.

## 2018-09-13 ENCOUNTER — DOCUMENTATION (OUTPATIENT)
Dept: CARDIAC REHAB | Facility: HOSPITAL | Age: 73
End: 2018-09-13

## 2018-09-25 ENCOUNTER — HOSPITAL ENCOUNTER (OUTPATIENT)
Dept: RESPIRATORY THERAPY | Facility: HOSPITAL | Age: 73
Discharge: HOME | End: 2018-09-25
Attending: INTERNAL MEDICINE | Admitting: INTERNAL MEDICINE

## 2018-09-25 DIAGNOSIS — I49.8 BRADYARRHYTHMIA: ICD-10-CM

## 2018-09-25 PROCEDURE — 93226 XTRNL ECG REC<48 HR SCAN A/R: CPT

## 2018-09-25 PROCEDURE — 93225 XTRNL ECG REC<48 HRS REC: CPT

## 2018-09-26 ENCOUNTER — HOSPITAL ENCOUNTER (EMERGENCY)
Facility: HOSPITAL | Age: 73
Discharge: HOME | End: 2018-09-26
Attending: EMERGENCY MEDICINE | Admitting: EMERGENCY MEDICINE

## 2018-09-26 ENCOUNTER — APPOINTMENT (OUTPATIENT)
Dept: CT IMAGING | Facility: HOSPITAL | Age: 73
End: 2018-09-26

## 2018-09-26 ENCOUNTER — APPOINTMENT (OUTPATIENT)
Dept: GENERAL RADIOLOGY | Facility: HOSPITAL | Age: 73
End: 2018-09-26

## 2018-09-26 VITALS
HEIGHT: 62 IN | OXYGEN SATURATION: 96 % | DIASTOLIC BLOOD PRESSURE: 65 MMHG | RESPIRATION RATE: 18 BRPM | HEART RATE: 66 BPM | SYSTOLIC BLOOD PRESSURE: 103 MMHG | BODY MASS INDEX: 34.96 KG/M2 | TEMPERATURE: 98.4 F | WEIGHT: 190 LBS

## 2018-09-26 DIAGNOSIS — N39.0 URINARY TRACT INFECTION WITHOUT HEMATURIA, SITE UNSPECIFIED: Primary | ICD-10-CM

## 2018-09-26 DIAGNOSIS — R41.0 CONFUSION: ICD-10-CM

## 2018-09-26 LAB
A-A DO2: 42.3 MMHG (ref 0–300)
ALBUMIN SERPL-MCNC: 4.3 G/DL (ref 3.4–4.8)
ALBUMIN/GLOB SERPL: 1.6 G/DL (ref 1.5–2.5)
ALP SERPL-CCNC: 52 U/L (ref 35–104)
ALT SERPL W P-5'-P-CCNC: 9 U/L (ref 10–36)
AMMONIA BLD-SCNC: 29 UMOL/L (ref 11–51)
ANION GAP SERPL CALCULATED.3IONS-SCNC: 8.1 MMOL/L (ref 3.6–11.2)
ARTERIAL PATENCY WRIST A: ABNORMAL
AST SERPL-CCNC: 16 U/L (ref 10–30)
ATMOSPHERIC PRESS: 728 MMHG
BACTERIA UR QL AUTO: ABNORMAL /HPF
BASE EXCESS BLDA CALC-SCNC: 4.5 MMOL/L
BASOPHILS # BLD AUTO: 0.02 10*3/MM3 (ref 0–0.3)
BASOPHILS NFR BLD AUTO: 0.2 % (ref 0–2)
BDY SITE: ABNORMAL
BILIRUB SERPL-MCNC: 0.8 MG/DL (ref 0.2–1.8)
BILIRUB UR QL STRIP: NEGATIVE
BNP SERPL-MCNC: 326 PG/ML (ref 0–100)
BODY TEMPERATURE: 98.6 C
BUN BLD-MCNC: 17 MG/DL (ref 7–21)
BUN/CREAT SERPL: 17.5 (ref 7–25)
CALCIUM SPEC-SCNC: 9.3 MG/DL (ref 7.7–10)
CHLORIDE SERPL-SCNC: 102 MMOL/L (ref 99–112)
CLARITY UR: CLEAR
CO2 SERPL-SCNC: 32.9 MMOL/L (ref 24.3–31.9)
COHGB MFR BLD: 2.2 % (ref 0–5)
COLOR UR: YELLOW
CREAT BLD-MCNC: 0.97 MG/DL (ref 0.43–1.29)
D DIMER PPP FEU-MCNC: <0.27 MCGFEU/ML (ref 0–0.5)
D-LACTATE SERPL-SCNC: 0.7 MMOL/L (ref 0.5–2)
DEPRECATED RDW RBC AUTO: 41.8 FL (ref 37–54)
EOSINOPHIL # BLD AUTO: 0.07 10*3/MM3 (ref 0–0.7)
EOSINOPHIL NFR BLD AUTO: 0.8 % (ref 0–7)
ERYTHROCYTE [DISTWIDTH] IN BLOOD BY AUTOMATED COUNT: 12.7 % (ref 11.5–14.5)
FLUAV AG NPH QL: NEGATIVE
FLUBV AG NPH QL IA: NEGATIVE
GFR SERPL CREATININE-BSD FRML MDRD: 56 ML/MIN/1.73
GLOBULIN UR ELPH-MCNC: 2.7 GM/DL
GLUCOSE BLD-MCNC: 106 MG/DL (ref 70–110)
GLUCOSE UR STRIP-MCNC: NEGATIVE MG/DL
HCO3 BLDA-SCNC: 29.5 MMOL/L (ref 22–26)
HCT VFR BLD AUTO: 39.1 % (ref 37–47)
HCT VFR BLD CALC: 40 % (ref 37–47)
HGB BLD-MCNC: 13.3 G/DL (ref 12–16)
HGB BLDA-MCNC: 13.7 G/DL (ref 12–16)
HGB UR QL STRIP.AUTO: NEGATIVE
HOLD SPECIMEN: NORMAL
HOLD SPECIMEN: NORMAL
HOROWITZ INDEX BLD+IHG-RTO: 21 %
HYALINE CASTS UR QL AUTO: ABNORMAL /LPF
IMM GRANULOCYTES # BLD: 0.02 10*3/MM3 (ref 0–0.03)
IMM GRANULOCYTES NFR BLD: 0.2 % (ref 0–0.5)
KETONES UR QL STRIP: NEGATIVE
LEUKOCYTE ESTERASE UR QL STRIP.AUTO: ABNORMAL
LIPASE SERPL-CCNC: 18 U/L (ref 13–60)
LYMPHOCYTES # BLD AUTO: 2.06 10*3/MM3 (ref 1–3)
LYMPHOCYTES NFR BLD AUTO: 23.5 % (ref 16–46)
MAGNESIUM SERPL-MCNC: 1.5 MG/DL (ref 1.7–2.6)
MCH RBC QN AUTO: 31.7 PG (ref 27–33)
MCHC RBC AUTO-ENTMCNC: 34 G/DL (ref 33–37)
MCV RBC AUTO: 93.3 FL (ref 80–94)
METHGB BLD QL: 0.4 % (ref 0–3)
MODALITY: ABNORMAL
MONOCYTES # BLD AUTO: 0.41 10*3/MM3 (ref 0.1–0.9)
MONOCYTES NFR BLD AUTO: 4.7 % (ref 0–12)
NEUTROPHILS # BLD AUTO: 6.17 10*3/MM3 (ref 1.4–6.5)
NEUTROPHILS NFR BLD AUTO: 70.6 % (ref 40–75)
NITRITE UR QL STRIP: NEGATIVE
OSMOLALITY SERPL CALC.SUM OF ELEC: 286.9 MOSM/KG (ref 273–305)
OXYHGB MFR BLDV: 82.7 % (ref 85–100)
PCO2 BLDA: 45.3 MM HG (ref 35–45)
PH BLDA: 7.43 PH UNITS (ref 7.35–7.45)
PH UR STRIP.AUTO: 7.5 [PH] (ref 5–8)
PLATELET # BLD AUTO: 165 10*3/MM3 (ref 130–400)
PMV BLD AUTO: 10.3 FL (ref 6–10)
PO2 BLDA: 46.5 MM HG (ref 80–100)
POTASSIUM BLD-SCNC: 3.3 MMOL/L (ref 3.5–5.3)
PROT SERPL-MCNC: 7 G/DL (ref 6–8)
PROT UR QL STRIP: NEGATIVE
RBC # BLD AUTO: 4.19 10*6/MM3 (ref 4.2–5.4)
RBC # UR: ABNORMAL /HPF
REF LAB TEST METHOD: ABNORMAL
SAO2 % BLDCOA: 84.9 % (ref 90–100)
SODIUM BLD-SCNC: 143 MMOL/L (ref 135–153)
SP GR UR STRIP: 1.02 (ref 1–1.03)
SQUAMOUS #/AREA URNS HPF: ABNORMAL /HPF
T4 FREE SERPL-MCNC: 1.19 NG/DL (ref 0.89–1.76)
TROPONIN I SERPL-MCNC: 0.13 NG/ML
TROPONIN I SERPL-MCNC: 0.14 NG/ML
TSH SERPL DL<=0.05 MIU/L-ACNC: 0.31 MIU/ML (ref 0.55–4.78)
UROBILINOGEN UR QL STRIP: ABNORMAL
WBC NRBC COR # BLD: 8.75 10*3/MM3 (ref 4.5–12.5)
WBC UR QL AUTO: ABNORMAL /HPF
WHOLE BLOOD HOLD SPECIMEN: NORMAL
WHOLE BLOOD HOLD SPECIMEN: NORMAL

## 2018-09-26 PROCEDURE — 85379 FIBRIN DEGRADATION QUANT: CPT | Performed by: EMERGENCY MEDICINE

## 2018-09-26 PROCEDURE — 82140 ASSAY OF AMMONIA: CPT | Performed by: EMERGENCY MEDICINE

## 2018-09-26 PROCEDURE — 84484 ASSAY OF TROPONIN QUANT: CPT | Performed by: EMERGENCY MEDICINE

## 2018-09-26 PROCEDURE — 83880 ASSAY OF NATRIURETIC PEPTIDE: CPT | Performed by: EMERGENCY MEDICINE

## 2018-09-26 PROCEDURE — 82375 ASSAY CARBOXYHB QUANT: CPT | Performed by: EMERGENCY MEDICINE

## 2018-09-26 PROCEDURE — 93005 ELECTROCARDIOGRAM TRACING: CPT | Performed by: EMERGENCY MEDICINE

## 2018-09-26 PROCEDURE — 70450 CT HEAD/BRAIN W/O DYE: CPT

## 2018-09-26 PROCEDURE — 36415 COLL VENOUS BLD VENIPUNCTURE: CPT

## 2018-09-26 PROCEDURE — 83690 ASSAY OF LIPASE: CPT | Performed by: EMERGENCY MEDICINE

## 2018-09-26 PROCEDURE — 85025 COMPLETE CBC W/AUTO DIFF WBC: CPT | Performed by: EMERGENCY MEDICINE

## 2018-09-26 PROCEDURE — 36600 WITHDRAWAL OF ARTERIAL BLOOD: CPT | Performed by: EMERGENCY MEDICINE

## 2018-09-26 PROCEDURE — 83735 ASSAY OF MAGNESIUM: CPT | Performed by: EMERGENCY MEDICINE

## 2018-09-26 PROCEDURE — 70450 CT HEAD/BRAIN W/O DYE: CPT | Performed by: RADIOLOGY

## 2018-09-26 PROCEDURE — 94799 UNLISTED PULMONARY SVC/PX: CPT

## 2018-09-26 PROCEDURE — 71045 X-RAY EXAM CHEST 1 VIEW: CPT

## 2018-09-26 PROCEDURE — 87040 BLOOD CULTURE FOR BACTERIA: CPT | Performed by: EMERGENCY MEDICINE

## 2018-09-26 PROCEDURE — 87804 INFLUENZA ASSAY W/OPTIC: CPT | Performed by: EMERGENCY MEDICINE

## 2018-09-26 PROCEDURE — 93010 ELECTROCARDIOGRAM REPORT: CPT | Performed by: INTERNAL MEDICINE

## 2018-09-26 PROCEDURE — 82805 BLOOD GASES W/O2 SATURATION: CPT | Performed by: EMERGENCY MEDICINE

## 2018-09-26 PROCEDURE — 81001 URINALYSIS AUTO W/SCOPE: CPT | Performed by: EMERGENCY MEDICINE

## 2018-09-26 PROCEDURE — 96365 THER/PROPH/DIAG IV INF INIT: CPT

## 2018-09-26 PROCEDURE — 94640 AIRWAY INHALATION TREATMENT: CPT

## 2018-09-26 PROCEDURE — 96361 HYDRATE IV INFUSION ADD-ON: CPT

## 2018-09-26 PROCEDURE — 71045 X-RAY EXAM CHEST 1 VIEW: CPT | Performed by: RADIOLOGY

## 2018-09-26 PROCEDURE — 80053 COMPREHEN METABOLIC PANEL: CPT | Performed by: EMERGENCY MEDICINE

## 2018-09-26 PROCEDURE — 99285 EMERGENCY DEPT VISIT HI MDM: CPT

## 2018-09-26 PROCEDURE — 84439 ASSAY OF FREE THYROXINE: CPT | Performed by: EMERGENCY MEDICINE

## 2018-09-26 PROCEDURE — 25010000002 CEFTRIAXONE: Performed by: EMERGENCY MEDICINE

## 2018-09-26 PROCEDURE — 84443 ASSAY THYROID STIM HORMONE: CPT | Performed by: EMERGENCY MEDICINE

## 2018-09-26 PROCEDURE — 83050 HGB METHEMOGLOBIN QUAN: CPT | Performed by: EMERGENCY MEDICINE

## 2018-09-26 PROCEDURE — 83605 ASSAY OF LACTIC ACID: CPT | Performed by: EMERGENCY MEDICINE

## 2018-09-26 RX ORDER — ONDANSETRON 4 MG/1
4 TABLET, ORALLY DISINTEGRATING ORAL 4 TIMES DAILY
Qty: 15 TABLET | Refills: 0 | Status: SHIPPED | OUTPATIENT
Start: 2018-09-26

## 2018-09-26 RX ORDER — IPRATROPIUM BROMIDE AND ALBUTEROL SULFATE 2.5; .5 MG/3ML; MG/3ML
3 SOLUTION RESPIRATORY (INHALATION) ONCE
Status: COMPLETED | OUTPATIENT
Start: 2018-09-26 | End: 2018-09-26

## 2018-09-26 RX ORDER — SULFAMETHOXAZOLE AND TRIMETHOPRIM 800; 160 MG/1; MG/1
1 TABLET ORAL 2 TIMES DAILY
Qty: 20 TABLET | Refills: 0 | Status: SHIPPED | OUTPATIENT
Start: 2018-09-26 | End: 2018-10-03

## 2018-09-26 RX ADMIN — SODIUM CHLORIDE 500 ML: 9 INJECTION, SOLUTION INTRAVENOUS at 18:42

## 2018-09-26 RX ADMIN — METOPROLOL TARTRATE 25 MG: 25 TABLET, FILM COATED ORAL at 22:34

## 2018-09-26 RX ADMIN — CEFTRIAXONE 1 G: 1 INJECTION, POWDER, FOR SOLUTION INTRAMUSCULAR; INTRAVENOUS at 22:34

## 2018-09-26 RX ADMIN — IPRATROPIUM BROMIDE AND ALBUTEROL SULFATE 3 ML: .5; 3 SOLUTION RESPIRATORY (INHALATION) at 18:15

## 2018-10-01 LAB
BACTERIA SPEC AEROBE CULT: NORMAL
BACTERIA SPEC AEROBE CULT: NORMAL

## 2018-10-04 ENCOUNTER — OFFICE VISIT (OUTPATIENT)
Dept: CARDIOLOGY | Facility: CLINIC | Age: 73
End: 2018-10-04

## 2018-10-04 ENCOUNTER — TRANSCRIBE ORDERS (OUTPATIENT)
Dept: ADMINISTRATIVE | Facility: HOSPITAL | Age: 73
End: 2018-10-04

## 2018-10-04 VITALS
DIASTOLIC BLOOD PRESSURE: 71 MMHG | WEIGHT: 188.5 LBS | HEART RATE: 62 BPM | HEIGHT: 62 IN | OXYGEN SATURATION: 95 % | SYSTOLIC BLOOD PRESSURE: 117 MMHG | BODY MASS INDEX: 34.69 KG/M2

## 2018-10-04 DIAGNOSIS — I48.19 PERSISTENT ATRIAL FIBRILLATION (HCC): ICD-10-CM

## 2018-10-04 DIAGNOSIS — I10 ESSENTIAL HYPERTENSION: Primary | ICD-10-CM

## 2018-10-04 DIAGNOSIS — I20.9 ANGINA, CLASS II (HCC): ICD-10-CM

## 2018-10-04 DIAGNOSIS — E11.59 TYPE 2 DIABETES MELLITUS WITH OTHER CIRCULATORY COMPLICATION, WITHOUT LONG-TERM CURRENT USE OF INSULIN (HCC): ICD-10-CM

## 2018-10-04 DIAGNOSIS — I25.118 CORONARY ARTERY DISEASE INVOLVING NATIVE CORONARY ARTERY OF NATIVE HEART WITH OTHER FORM OF ANGINA PECTORIS (HCC): ICD-10-CM

## 2018-10-04 DIAGNOSIS — E78.2 MIXED HYPERLIPIDEMIA: ICD-10-CM

## 2018-10-04 DIAGNOSIS — I25.118 CORONARY ARTERY DISEASE OF NATIVE ARTERY OF NATIVE HEART WITH STABLE ANGINA PECTORIS (HCC): ICD-10-CM

## 2018-10-04 DIAGNOSIS — R41.82 ALTERED MENTAL STATUS, UNSPECIFIED ALTERED MENTAL STATUS TYPE: Primary | ICD-10-CM

## 2018-10-04 DIAGNOSIS — R41.0 CONFUSION: ICD-10-CM

## 2018-10-04 PROCEDURE — 99213 OFFICE O/P EST LOW 20 MIN: CPT | Performed by: NURSE PRACTITIONER

## 2018-10-04 RX ORDER — MONTELUKAST SODIUM 4 MG/1
TABLET, CHEWABLE ORAL
COMMUNITY
Start: 2018-09-27

## 2018-10-04 RX ORDER — BUDESONIDE AND FORMOTEROL FUMARATE DIHYDRATE 160; 4.5 UG/1; UG/1
AEROSOL RESPIRATORY (INHALATION)
COMMUNITY
Start: 2018-09-27

## 2018-10-04 NOTE — PROGRESS NOTES
Subjective     Chief Complaint: NSTEMI; Hypertension; Hyperlipidemia; Atrial Fibrillation; and Coronary Artery Disease    History of Present Illness   Emily Roberto is a 73 y.o. female who presents with a past medical history significant for coronary artery disease, status post PCI BENTLEY to the LAD and diagonal branch in December 2016 and staged intervention PCI BENTLEY to the RCA in January 2017, hyperlipidemia, and hypertension.  In August 2018 she was hospitalized with altered mental status and noted to have elevated troponin.  Cardiac catheterization revealed a 30% in-stent restenosis in the proximal RCA and mild obstructive coronary artery disease elsewhere.  She was seen in follow-up, a 48 hour Holter monitor was ordered due to brief 2.6 second part during her hospital stay and Imdur for was resumed.  She presented to the ED last week after a fall at home.  She is here today for follow-up of that ED visit.    Ms. Roberto is accompanied by her daughter for today's exam.  Ms Roberto states that she fell while trying to get out of bed last week.  She does not know if she was dizzy, light headed or lost consciousness during the episode.  She states that bed is too high and she has difficulty getting out of it.  She states she did not hit her head.      She tells me that her PCP is currently working her up for a possible stroke or TIA.      She does report that she had some chest pain last week which she describes as being sharp and lasting only for a few minutes.  Chest pain resolved spontaneously.      Current Outpatient Prescriptions:   •  albuterol (PROAIR HFA) 108 (90 BASE) MCG/ACT inhaler, Inhale 2 puffs Every 4 (Four) Hours As Needed for Wheezing., Disp: 1 inhaler, Rfl: 6  •  amLODIPine (NORVASC) 5 MG tablet, Take 5 mg by mouth Daily., Disp: , Rfl:   •  apixaban (ELIQUIS) 5 MG tablet tablet, Take 1 tablet by mouth Every 12 (Twelve) Hours., Disp: 60 tablet, Rfl: 1  •  atorvastatin (LIPITOR) 20 MG tablet, Take 2  "tablets by mouth Every Night., Disp: 30 tablet, Rfl: 11  •  clopidogrel (PLAVIX) 75 MG tablet, Take 1 tablet by mouth Daily., Disp: 30 tablet, Rfl: 11  •  diazePAM (VALIUM) 5 MG tablet, Take 5 mg by mouth 2 (Two) Times a Day As Needed for Anxiety., Disp: , Rfl:   •  gabapentin (NEURONTIN) 800 MG tablet, Take 800 mg by mouth 3 (Three) Times a Day., Disp: , Rfl:   •  HYDROcodone-acetaminophen (NORCO)  MG per tablet, Take 1 tablet by mouth 3 (Three) Times a Day As Needed for Moderate Pain ., Disp: , Rfl:   •  isosorbide mononitrate (IMDUR) 30 MG 24 hr tablet, Take 1 tablet by mouth Daily., Disp: 30 tablet, Rfl: 11  •  metFORMIN (GLUCOPHAGE) 500 MG tablet, Take 500 mg by mouth 2 (Two) Times a Day With Meals., Disp: , Rfl:   •  metoprolol tartrate (LOPRESSOR) 25 MG tablet, Take 1 tablet by mouth 2 (Two) Times a Day., Disp: 60 tablet, Rfl: 11  •  vitamin D (ERGOCALCIFEROL) 05446 UNITS capsule capsule, Take 50,000 Units by mouth Every 7 (Seven) Days. Patient takes on friday, Disp: , Rfl:   •  colestipol (COLESTID) 1 g tablet, , Disp: , Rfl:   •  ondansetron ODT (ZOFRAN-ODT) 4 MG disintegrating tablet, Take 1 tablet by mouth 4 (Four) Times a Day., Disp: 15 tablet, Rfl: 0  •  SYMBICORT 160-4.5 MCG/ACT inhaler, , Disp: , Rfl:      The following portions of the patient's history were reviewed and updated as appropriate: allergies, current medications, past family history, past medical history, past social history, past surgical history and problem list.    Review of Systems   Cardiovascular: Positive for chest pain (last week), dyspnea on exertion and irregular heartbeat.   Respiratory: Positive for cough.    Hematologic/Lymphatic: Bruises/bleeds easily.   Neurological: Positive for dizziness and light-headedness.   All other systems reviewed and are negative.        Objective     /71 (BP Location: Right arm, Patient Position: Sitting)   Pulse 62   Ht 157.5 cm (62\")   Wt 85.5 kg (188 lb 8 oz)   SpO2 95%   BMI " 34.48 kg/m²     Physical Exam   Constitutional: She appears well-developed and well-nourished.   HENT:   Head: Normocephalic and atraumatic.   Eyes: Pupils are equal, round, and reactive to light.   Neck: No JVD present.   Cardiovascular: Normal rate, regular rhythm and intact distal pulses.  Exam reveals no gallop and no friction rub.    No murmur heard.  Pulmonary/Chest: Effort normal and breath sounds normal. No respiratory distress. She has no wheezes. She has no rales.   Abdominal: Soft. She exhibits no mass. There is no tenderness. No hernia.   Skin: Skin is warm and dry.   Psychiatric: She has a normal mood and affect.   Vitals reviewed.      Procedures      Assessment/Plan       Emily was seen today for nstemi, hypertension, hyperlipidemia, atrial fibrillation and coronary artery disease.    Diagnoses and all orders for this visit:    Assessment:  1. Chest pain  2. Recent fall  3. CAD, PTCA BENTLEY to the LAD and diagonal branch on 12/6/2016 and easy BENTLEY RCA on 1/11/2017.  Cath on 8/13/18 with 30% in stent restenosis of RCA stent.  4. Essential hypertension  5. Persistent atrial fibrillation, CHADsVASc is at least 5 for hypertension, diabetes, CAD, age and sex.  Her nuclear anticoagulated on Eliquis.  6. Hypertension  7. Hyperlipidemia      Plan:  1. Chest pain is atypical and given her recent catheterization with mild obstructive disease, this complaint should be managed medically.    2. Blood pressure and heart rate appear to be stable.  Holter monitor results are not available.  In light of the fact that Mrs. Roberto has had a recent fall with unknown LOC an event monitor was ordered.  Will probably need to consider discontinuing or lowering her Eliquis dose.  3. Continue current medications as is.  4. Return to clinic in 2-3 weeks, sooner for any worsening or concerning symptoms.      Return in about 3 weeks (around 10/25/2018).    ASHVIN Manzo

## 2018-10-07 PROCEDURE — 93227 XTRNL ECG REC<48 HR R&I: CPT | Performed by: INTERNAL MEDICINE

## 2018-10-10 ENCOUNTER — TELEPHONE (OUTPATIENT)
Dept: CARDIOLOGY | Facility: CLINIC | Age: 73
End: 2018-10-10

## 2018-10-10 NOTE — TELEPHONE ENCOUNTER
Ms Roberto' daughter called me back and stated that her mom was fine.  She denies syncopal or near syncopal event.  She did not know she had pressed any button on her event monitor.

## 2018-10-10 NOTE — TELEPHONE ENCOUNTER
Jb at [facility] old to say that Mrs. Roberto pushed the button indicating that she was having syncope.  They were unable to reach her.  Her heart rhythm was noted to be in atrial fibrillation, which is permanent for the patient.    I called the patient's phone number and there was no answer.  I then called her daughter Josette Hernandez and she stated that she would go and check on her mom.  She will call me back.

## 2018-10-16 ENCOUNTER — TRANSCRIBE ORDERS (OUTPATIENT)
Dept: ADMINISTRATIVE | Facility: HOSPITAL | Age: 73
End: 2018-10-16

## 2018-10-16 DIAGNOSIS — R41.82 ALTERED MENTAL STATUS, UNSPECIFIED ALTERED MENTAL STATUS TYPE: Primary | ICD-10-CM

## 2018-10-18 ENCOUNTER — HOSPITAL ENCOUNTER (OUTPATIENT)
Dept: MRI IMAGING | Facility: HOSPITAL | Age: 73
Discharge: HOME | End: 2018-10-18
Admitting: FAMILY MEDICINE

## 2018-10-18 DIAGNOSIS — R41.82 ALTERED MENTAL STATUS, UNSPECIFIED ALTERED MENTAL STATUS TYPE: ICD-10-CM

## 2018-10-18 PROCEDURE — 70551 MRI BRAIN STEM W/O DYE: CPT

## 2018-10-18 PROCEDURE — 70551 MRI BRAIN STEM W/O DYE: CPT | Performed by: RADIOLOGY

## 2018-10-19 ENCOUNTER — OFFICE VISIT (OUTPATIENT)
Dept: CARDIOLOGY | Facility: CLINIC | Age: 73
End: 2018-10-19

## 2018-10-19 VITALS
HEART RATE: 73 BPM | WEIGHT: 185.4 LBS | DIASTOLIC BLOOD PRESSURE: 75 MMHG | SYSTOLIC BLOOD PRESSURE: 129 MMHG | OXYGEN SATURATION: 95 % | BODY MASS INDEX: 34.12 KG/M2 | HEIGHT: 62 IN

## 2018-10-19 DIAGNOSIS — E87.6 HYPOKALEMIA: ICD-10-CM

## 2018-10-19 DIAGNOSIS — I48.19 PERSISTENT ATRIAL FIBRILLATION (HCC): ICD-10-CM

## 2018-10-19 DIAGNOSIS — E78.2 MIXED HYPERLIPIDEMIA: ICD-10-CM

## 2018-10-19 DIAGNOSIS — I25.118 CORONARY ARTERY DISEASE OF NATIVE ARTERY OF NATIVE HEART WITH STABLE ANGINA PECTORIS (HCC): Primary | ICD-10-CM

## 2018-10-19 DIAGNOSIS — K59.00 CONSTIPATION, UNSPECIFIED CONSTIPATION TYPE: ICD-10-CM

## 2018-10-19 DIAGNOSIS — I10 ESSENTIAL HYPERTENSION: ICD-10-CM

## 2018-10-19 PROCEDURE — 99214 OFFICE O/P EST MOD 30 MIN: CPT | Performed by: INTERNAL MEDICINE

## 2018-10-19 RX ORDER — POLYETHYLENE GLYCOL 3350 17 G/17G
17 POWDER, FOR SOLUTION ORAL DAILY PRN
Qty: 30 PACKET | Refills: 3 | Status: SHIPPED | OUTPATIENT
Start: 2018-10-19 | End: 2018-11-15

## 2018-10-19 RX ORDER — DOCUSATE SODIUM 50 MG/5ML
100 LIQUID ORAL 2 TIMES DAILY
Status: SHIPPED | OUTPATIENT
Start: 2018-10-19

## 2018-10-19 NOTE — PROGRESS NOTES
[facility] CARDIOLOGY  [address]  [place] [address]  Phone: [phone]  Fax: [phone]    10/19/2018    Chief Complaint   Patient presents with   • Coronary Artery Disease   • Atrial Fibrillation   • COPD   • dyslipidemia   • Ischemic Heart Disease   • Diabetes        History:   Emily Roberto is a 73 y.o. female seen in followup, we initially saw her in the hospital for A. fib with tachybradycardia syndrome, she had a 2-2.5 second pauses, subsequently she was discharged and saw her in the office back and put her on event recorder for 14 days, we don't have the final report, but strips showed pauses upto 2 sec. No syncope, on and off dizziness.   She also has hx chronic constipation and moves bowel once every 2 weeks..!!  She has also noted dark tarry stools on and off.  Also has hemorrhoids from constipation and occasional bright red blood.    Past Medical History:   Diagnosis Date   • Atrial fibrillation (CMS/[facility])    • CAD (coronary artery disease), native coronary artery 12/3/2016   • Centrilobular emphysema (CMS/[facility]) 12/8/2016   • Diabetes (CMS/[facility])    • Hypercholesteremia    • Hypertension    • Osteoporosis        Past Surgical History:   Procedure Laterality Date   • CARDIAC CATHETERIZATION N/A 12/1/2016    Procedure: Left Heart Cath;  Surgeon: Nate Butler MD;  Location:  [facility] CATH INVASIVE LOCATION;  Service:    • CARDIAC CATHETERIZATION N/A 12/5/2016    Procedure: Left Heart Cath;  Surgeon: Alyssa Montalvo MD;  Location:  [facility];  Service:    • CARDIAC CATHETERIZATION N/A 1/11/2017    Procedure: Left Heart Cath/STENT RCA;  Surgeon: Nate Butler MD;  Location:  [facility] CATH INVASIVE [facility];  Service:    • CARDIAC CATHETERIZATION N/A 8/13/2018    Procedure: Left Heart Cath;  Surgeon: Shaheen Purcell MD;  Location:  [facility] CATH INVASIVE [facility];  Service: Cardiology   • CATARACT EXTRACTION     • CHOLECYSTECTOMY     • CYST  REMOVAL          Past Social History:  Social History     Social History   • Marital status:      Social History Main Topics   • Smoking status: Former Smoker     Packs/day: 1.00     Years: 10.00     Types: Cigarettes     Quit date: 12/12/2016   • Smokeless tobacco: Never Used   • Alcohol use No   • Drug use: No   • Sexual activity: Defer     Other Topics Concern   • Not on file       Past Family History:  Family History   Problem Relation Age of Onset   • Hypertension Mother    • Heart disease Mother    • Diabetes Mother    • Hypertension Father    • Heart disease Father    • Diabetes Father    • Cancer Sister    • Heart disease Sister    • Hypertension Sister        Review of Systems:   Review of Systems   Constitution: Positive for decreased appetite, diaphoresis, weakness and weight loss. Negative for fever and weight gain.   HENT: Negative for congestion, nosebleeds and sore throat.    Eyes: Negative for blurred vision and visual disturbance.   Cardiovascular: Positive for chest pain, dyspnea on exertion and orthopnea. Negative for claudication, irregular heartbeat, leg swelling, palpitations, paroxysmal nocturnal dyspnea and syncope.   Respiratory: Positive for shortness of breath and wheezing. Negative for cough, hemoptysis, sleep disturbances due to breathing, snoring and sputum production.    Endocrine: Positive for cold intolerance. Negative for heat intolerance, polydipsia, polyphagia and polyuria.   Hematologic/Lymphatic: Negative for bleeding problem.   Skin: Positive for dry skin. Negative for itching and rash.   Musculoskeletal: Positive for muscle cramps and myalgias. Negative for muscle weakness.   Gastrointestinal: Positive for abdominal pain, constipation, diarrhea, hemorrhoids, melena, nausea and vomiting. Negative for heartburn, hematemesis and hematochezia.   Genitourinary: Negative for hematuria and nocturia.   Neurological: Positive for dizziness, headaches, light-headedness and  "vertigo. Negative for excessive daytime sleepiness, focal weakness, numbness and seizures.   Psychiatric/Behavioral: Positive for depression. Negative for substance abuse and suicidal ideas.   Allergic/Immunologic: Negative for environmental allergies.         Current Outpatient Prescriptions   Medication Sig Dispense Refill   • albuterol (PROAIR HFA) 108 (90 BASE) MCG/ACT inhaler Inhale 2 puffs Every 4 (Four) Hours As Needed for Wheezing. 1 inhaler 6   • amLODIPine (NORVASC) 5 MG tablet Take 5 mg by mouth Daily.     • apixaban (ELIQUIS) 5 MG tablet tablet Take 1 tablet by mouth Every 12 (Twelve) Hours. 60 tablet 1   • atorvastatin (LIPITOR) 20 MG tablet Take 2 tablets by mouth Every Night. 30 tablet 11   • clopidogrel (PLAVIX) 75 MG tablet Take 1 tablet by mouth Daily. 30 tablet 11   • colestipol (COLESTID) 1 g tablet      • diazePAM (VALIUM) 5 MG tablet Take 5 mg by mouth 2 (Two) Times a Day As Needed for Anxiety.     • gabapentin (NEURONTIN) 800 MG tablet Take 800 mg by mouth 3 (Three) Times a Day.     • HYDROcodone-acetaminophen (NORCO)  MG per tablet Take 1 tablet by mouth 3 (Three) Times a Day As Needed for Moderate Pain .     • isosorbide mononitrate (IMDUR) 30 MG 24 hr tablet Take 1 tablet by mouth Daily. 30 tablet 11   • metFORMIN (GLUCOPHAGE) 500 MG tablet Take 500 mg by mouth 2 (Two) Times a Day With Meals.     • metoprolol tartrate (LOPRESSOR) 25 MG tablet Take 1 tablet by mouth 2 (Two) Times a Day. 60 tablet 11   • ondansetron ODT (ZOFRAN-ODT) 4 MG disintegrating tablet Take 1 tablet by mouth 4 (Four) Times a Day. 15 tablet 0   • SYMBICORT 160-4.5 MCG/ACT inhaler      • vitamin D (ERGOCALCIFEROL) 30298 UNITS capsule capsule Take 50,000 Units by mouth Every 7 (Seven) Days. Patient takes on friday       No current facility-administered medications for this visit.         No Known Allergies    Objective     /75 (BP Location: Right arm)   Pulse 73   Ht 157.5 cm (62\")   Wt 84.1 kg (185 lb 6.4 " oz)   SpO2 95%   BMI 33.91 kg/m²     Physical Exam   Constitutional: She is oriented to person, place, and time. She appears well-developed and well-nourished.   Eyes: Pupils are equal, round, and reactive to light. EOM are normal.   Neck: Normal range of motion. Neck supple.   Cardiovascular: Normal rate.    Murmur heard.  Irregular rhythm   Pulmonary/Chest: Effort normal and breath sounds normal.   Abdominal: Soft. Bowel sounds are normal. There is no tenderness.   Neurological: She is alert and oriented to person, place, and time.   Skin: Skin is warm and dry. Capillary refill takes less than 2 seconds.       DATA:      Results for orders placed during the hospital encounter of 08/11/18   Adult Transthoracic Echo Complete W/ Cont if Necessary Per Protocol    Narrative · Normal left ventricular cavity size and wall thickness noted. All left   ventricular wall segments contract normally.  · The following left ventricular wall segments are hypokinetic: apical   anterior, apical lateral, apical inferior, apical septal and apex   hypokinetic.  · Estimated EF appears to be in the range of 51 - 55%.  · The aortic valve is not well visualized. The aortic valve is abnormal in   structure. The valve exhibits sclerosis. No aortic valve regurgitation is   present. No hemodynamically significant aortic valve stenosis is present.  · The mitral valve is normal in structure. Mild mitral valve regurgitation   is present. No significant mitral valve stenosis is present.  · The tricuspid valve is normal. Moderate tricuspid valve stenosis is   present. Mild tricuspid valve regurgitation is present. Estimated right   ventricular systolic pressure from tricuspid regurgitation is moderately   elevated (45-55 mmHg).  · Moderate pulmonary hypertension is present.  · There is no evidence of pericardial effusion.             Results for orders placed during the hospital encounter of 08/11/18   Cardiac Catheterization/Vascular Study     Addendum CARDIAC CATHETERIZATION / INTERVENTION REPORT   DATE OF PROCEDURE: 8/13/2018   INDICATION FOR PROCEDURE: NSTEMI    Preprocedure diagnosis: Non-ST elevation myocardial infarction. Atrial fibrillation. Diabetes mellitus type II. Hypertension.   Postprocedure diagnosis: Mild nonobstructive coronary artery disease.  Indications: Patient is 73-year-old female with known history of CAD, prior LAD and RCA  stent, presenting with dyspnea, troponin evaluation at 2, trending down,.   This procedure was done for evaluation of her troponin elevation and  non-ST elevation myocardial infarction.   PROCEDURE PERFORMED:   1. Selective right and left Coronary Angiogram 2. R femoral arteriotomy closure with Mynx  device.   PROCEDURE COMMENTS:  Emily Roberto was brought to the cath lab and placed on the cardiac  catherization table in the postabsortive state. The patient was prepped  and draped according to the cath lab protocol under strict aseptic and  sterile condition.  The patient's right radial arterial site posterior  than the usual sterile fashion.  IV Versed and fentanyl was used for  conscious sedation.  We used a micropuncture needle to access the right  radial artery operation chest sheath was introduced in place.  5 Congolese  JL4 catheter was used for left coronary angiogram.  Due to a patient's  ostial stent to the right coronary artery we had challenge and in engaging  the right coronary artery to radial approach, we tried multiple catheters  including a 5 Congolese JR 4, multipurpose, AR mod 1, and multipurpose  catheter.  We then decided to go through the right femoral groin access to  engage the right coronary artery.    Patient's right femoral artery sight was prepped and draped. Under  fluoroscopic guidance the right femoral artery was punctured using a  micropuncture needle utilizing the modified Seldinger technique. 6 Swazi  sheath was introduced over a wire. After aspirating for blood it was   flushed with heparinized saline.  We then used a 6 Luxembourger JR4 diagnostic  catheter to engage the right coronary angiogram and adequate pictures were  taken in orthogonal projections.  After completion of the procedure the femoral sheath was removed and a 6  Luxembourger Mynx  closure device was used to achieve immediate and complete  hemostasis. The patient tolerated the procedure without complications.      FINDINGS:    CORONARY ANGIOGRAPHY:     Left main: The left main coronary artery is a moderate caliber vessel, no  significant stenosis noted.   LAD: The left anterior listening artery is a moderate to vessel  proximally, there was a stent to the proximal portion which was widely  patent, the mid and distal LAD had mild diffuse disease.  The diagonal 1  and 2 arteries were small to moderate caliber vessel with mild diffuse  disease.  Ramus branch: Moderate calibre vessel with mild diffuse disease  Left circumflex:  The left circumflex artery is a moderate caliber vessel  with mild diffuse disease, 2 small caliber OM arteries, the AV groove  branch is a moderate calibre vessel with mild diffuse disease.   Right coronary artery: The right coronary artery is a large dominant artery, there was a stent to  the proximal RCA extending all the way to the ostium.  There was mild to  30% in-stent restenosis, the mid RCA had 40-50% stenosis, the distal RCA  had mild diffuse disease,  PDA proximal stent was widely patent, mild to  moderate diffuse disease in a small caliber vessel distally, the right PLB  branch is a small to moderate vessel with mild diffuse disease.   Final impression:  Mild obstructive coronary artery disease, with patent stent of the LAD and  mild in-stent restenosis proximal right coronary artery stent and patent R  PDA stent.      RECOMMENDATIONS:  Medical management for CAD.   Continue with the current dual antiplatelet medication with aspirin and  Plavix.   Shaheen Nando Purcell MD 08/13/18 3:33  PM      Shaheen Purcell MD 8/14/2018  2:56 PM          Narrative CARDIAC CATHETERIZATION / INTERVENTION REPORT     DATE OF PROCEDURE: 8/13/2018     INDICATION FOR PROCEDURE: NSTEMI       Preprocedure diagnosis:  Non-ST elevation myocardial infarction.  Atrial fibrillation.  Diabetes mellitus type II.  Hypertension.      Postprocedure diagnosis:  Mild nonobstructive coronary artery disease.    Indications:  Patient is 73-year-old female with known history of CAD, prior LAD and RCA   stent, presenting with dyspnea, troponin evaluation at 2, trending down,.    This procedure was done for evaluation of her troponin elevation and   non-ST elevation myocardial infarction.      PROCEDURE PERFORMED:     1. Selective right and left Coronary Angiogram  2. R femoral arteriotomy closure with Mynx  device.     PROCEDURE COMMENTS:    Emily Roberto was brought to the cath lab and placed on the cardiac   catherization table in the postabsortive state. The patient was prepped   and draped according to the cath lab protocol under strict aseptic and   sterile condition.  The patient's right radial arterial site posterior   than the usual sterile fashion.  IV Versed and fentanyl was used for   conscious sedation.  We used a micropuncture needle to access the right   radial artery operation chest sheath was introduced in place.  5 Cook Islander   JL4 catheter was used for left coronary angiogram.  Due to a patient's   ostial stent to the right coronary artery we have challenge and in   engaging the right coronary artery to radial approach, we tried multiple   catheters including a 5 Cook Islander JR 4, multipurpose, AR mod 1, and check a   catheter.  We then decided to go through the right femoral groin access to   engage the right coronary artery.        Patient's right femoral artery sight was prepped and draped. Under   fluoroscopic guidance the right femoral artery was punctured using a   micropuncture needle utilizing the  modified Seldinger technique. 6 Kyrgyz   sheath was introduced over a wire. After aspirating for blood it was   flushed with heparinized saline.  We then used a 6 Venezuelan JR4 diagnostic   catheter to engage the right coronary angiogram and adequate pictures were   taken in orthogonal projections.    After completion of the procedure the femoral sheath was removed and a 6   Venezuelan EVAN prescription device was used to achieve immediate and complete   hemostasis. The patient tolerated the procedure without complications.         FINDINGS:       CORONARY ANGIOGRAPHY:        Left main: The left main coronary artery is a moderate caliber vessel, no   significant stenosis noted.     LAD: The left anterior listening artery is a moderate to vessel   proximally, there was a stent to the proximal portion which was widely   patent, the mid and distal LAD had mild diffuse disease.  The diagonal 1   and 2 arteries were small to moderate caliber vessel with mild diffuse   disease.    Left circumflex:  The left circumflex artery is a moderate caliber vessel   with mild diffuse disease, 2 small caliber OM arteries, the AV groove   branch is kind of small to moderate tenderness with mild diffuse disease.      Right coronary artery:  The right coronary artery is a large dominant artery, there was a stent to   the proximal RCA extending all the way to the ostium.  There was mild to   30% in-stent restenosis, the mid RCA had 40-50% stenosis, the distal RCA   had mild diffuse disease, moderate PDA stent was widely patent, mild to   moderate diffuse disease and a small caliber vessel distally, the right   LDL branch is a small to moderate vessel with mild diffuse disease.     Final impression:   Mild are not artery to coronary artery disease, with patent stent of the   LAD and mild in-stent restenosis proximal right coronary artery stent.         RECOMMENDATIONS:   Medical management for CAD.    Continue with the current dual antiplatelet  medication with aspirin and   Plavix.      Shaheen Purcell MD  08/13/18  3:33 PM     Procedures       r      Assessment:  CAD status post LAD, proximal RCA, right PDA PCI in the past, recent heart catheterization showed a mild in-stent restenosis in the proximal RCA, patent proximal LAD and right PDA stents, no other significant obstructive stenosis noted.  Stable ischemic heart disease.  Persistent atrial fibrillation with CHADS-VASc score of 5 on eliquis for oral anticoagulation, currently heart rate well controlled on medication.  COPD  Dyslipidemia  Obesity  Chronic constipation  Dark stools hx of polyps.   Chronic Hypokalemia    Plan:  Needs Gi Referral for evaluation of possible lower GI bleed. Also consider checking stool occult blood, cbc and bmp.   Will decrease Eliquis to 2.5 mg bid due to possible lower GI bleed and hx of falls and on Plavix for CAD.   Colace and Miralax PRN for constipation.   Cont with other medications as before.   Reviewed Event recorded , she has not had >2.5 sec pauses, no syncope.   Cont with monitor and F/u in 6 weeks.           Recommended increase activity to 30 minutes of walking daily, most days of the week.        Patient's Body mass index is 33.91 kg/m². BMI is above normal parameters. Recommendations include: educational material.       No Follow-up on file.    Thank you for allowing me to participate in the care of Emily Roberto. Feel free to contact me directly with any further questions or concerns.          Shaheen Purcell MD, FACC  Interventional Cardiology

## 2018-11-08 ENCOUNTER — DOCUMENTATION (OUTPATIENT)
Dept: CARDIAC REHAB | Facility: HOSPITAL | Age: 73
End: 2018-11-08

## 2018-11-08 NOTE — PROGRESS NOTES
Cardiac Rehab staff has tried to reach patient several times on 9/11/18 and 9/25/18 we left messages . She had stated on 10/19/18 she doesn't drive so she probably will not come. I told her if she changed her mind to call  And we would schedule her.

## 2018-11-15 ENCOUNTER — CONSULT (OUTPATIENT)
Dept: GASTROENTEROLOGY | Facility: CLINIC | Age: 73
End: 2018-11-15

## 2018-11-15 VITALS
BODY MASS INDEX: 35.26 KG/M2 | WEIGHT: 191.6 LBS | HEIGHT: 62 IN | DIASTOLIC BLOOD PRESSURE: 80 MMHG | HEART RATE: 85 BPM | OXYGEN SATURATION: 92 % | SYSTOLIC BLOOD PRESSURE: 142 MMHG

## 2018-11-15 DIAGNOSIS — R15.2 INCONTINENCE OF FECES WITH FECAL URGENCY: Primary | ICD-10-CM

## 2018-11-15 DIAGNOSIS — K59.00 CONSTIPATION, UNSPECIFIED CONSTIPATION TYPE: ICD-10-CM

## 2018-11-15 DIAGNOSIS — R15.9 INCONTINENCE OF FECES WITH FECAL URGENCY: Primary | ICD-10-CM

## 2018-11-15 DIAGNOSIS — R11.0 NAUSEA: ICD-10-CM

## 2018-11-15 PROCEDURE — 99214 OFFICE O/P EST MOD 30 MIN: CPT | Performed by: PHYSICIAN ASSISTANT

## 2018-11-15 NOTE — PATIENT INSTRUCTIONS
Fiber Foods  It is recommended that you consume 25-45 grams daily.  Drink 3-4 bottles of water.    Fresh & Dried Fruit  Serving Size Fiber (g)    Apples with skin  1 medium 5.0    Apricot  3 medium 1.0    Apricots, dried  4 pieces 2.9    Banana  1 medium 3.9    Blueberries  1 cup 4.2    Cantaloupe, cubes  1 cup 1.3    Figs, dried  2 medium 3.7    Grapefruit  1/2 medium 3.1    Orange, navel  1 medium 3.4    Peach  1 medium 2.0    Peaches, dried  3 pieces 3.2    Pear  1 medium 5.1    Plum  1 medium 1.1    Raisins  1.5 oz box 1.6    Raspberries  1 cup 6.4    Strawberries  1 cup 4.4      Grains, Beans, Nuts & Seeds  Serving Size Fiber (g)    Almonds  1 oz 4.2    Black beans, cooked  1 cup 13.9    Bran cereal  1 cup 19.9    Bread, whole wheat  1 slice 2.0    Brown rice, dry  1 cup 7.9    Cashews  1 oz 1.0    Flax seeds  3 Tbsp. 6.9    Garbanzo beans, cooked  1 cup 5.8    Kidney beans, cooked  1 cup 11.6    Lentils, red cooked  1 cup 13.6    Lima beans, cooked  1 cup 8.6    Oats, rolled dry  1 cup 12.0    Quinoa (seeds) dry  1/4 cup 6.2    Quinoa, cooked  1 cup 8.4    Pasta, whole wheat  1 cup 6.3    Peanuts  1 oz 2.3    Pistachio nuts  1 oz 3.1    Pumpkin seeds  1/4 cup 4.1    Soybeans, cooked  1 cup 8.6    Sunflower seeds  1/4 cup 3.0    Walnuts  1 oz 3.1            Vegetables  Serving Size Fiber (g)    Avocado (fruit)  1 medium 11.8    Beets, cooked  1 cup 2.8    Beet greens  1 cup 4.2    Bok nasir, cooked  1 cup 2.8    Broccoli, cooked  1 cup 4.5    [place] sprouts, cooked  1 cup 3.6    Cabbage, cooked  1 cup 4.2    Carrot  1 medium 2.6    Carrot, cooked  1 cup 5.2    Cauliflower, cooked  1 cup 3.4    Jewel slaw  1 cup 4.0    Zayra greens, cooked  1 cup 2.6    Corn, sweet  1 cup 4.6    Green beans  1 cup 4.0    Celery  1 stalk 1.1    Kale, cooked  1 cup 7.2    Onions, raw  1 cup 2.9    Peas, cooked  1 cup 8.8    Peppers, sweet  1 cup 2.6    Pop corn, air-popped  3 cups 3.6    Potato, baked w/ skin  1 medium 4.8     Spinach, cooked  1 cup 4.3    Summer squash, cooked  1 cup 2.5    Sweet potato, cooked  1 medium 4.9    Swiss chard, cooked  1 cup 3.7    Tomato  1 medium 1.0    Winter squash, cooked  1 cup 6.2    Zucchini, cooked  1 cup 2.6

## 2018-11-15 NOTE — PROGRESS NOTES
": 1945    Chief Complaint   Patient presents with   • Constipation   • Diarrhea       Emily Roberto is a 73 y.o. female who presents to the office today as a consultation from Shaheen Purcell MD for evaluation of Constipation and Diarrhea.    History of Present Illness:  She reports trouble with her bowels over the past several years. She was very constipated at first. She has gone up to 3 weeks without a bowel movement. She took OTC laxatives with some relief. Later, her bowel habits became more regular with later intermittent diarrhea and fecal incontinence. Her cardiologist recently gave her Miralax to take for constipation. Her primary care doctor gave her colestipol for treatment of diarrhea with hypokalemia. She has been taking the colestipol for the past 2 weeks which has seemed to help her some per her report. She has only been taking Miralax 17 g once daily PRN constipation but only taken a few times. She takes Norco 10 as needed for chronic back pain and has been taking it for several years. States that she eats \"whatever she wants\" and does get some fiber in her diet. She drinks 2-3 bottles of water daily.    Over the past couple of weeks, she has been having a bowel movement every day. She will have 1 sip day between stools at times. When she has a bowel movement, it is usually loose. No rectal bleeding but will sometimes be dark in color. If she has 4-5 days or 1 week without a bowel movement, she will have nausea and vomiting along with bloating.     Last colonoscopy was approx 4-5 years ago by Dr. García at [facility]. She reports that she was told she had colon polyps at that time. There is no known family history of colon cancer or colon polyps.    Review of Systems   Constitutional: Negative for appetite change, chills, fatigue, fever and unexpected weight change.   HENT: Negative for trouble swallowing.    Eyes: Negative.    Respiratory: Negative for cough, choking, " chest tightness and shortness of breath.    Cardiovascular: Negative for chest pain.   Gastrointestinal: Positive for abdominal distention, constipation, diarrhea, nausea and vomiting. Negative for abdominal pain, anal bleeding, blood in stool and rectal pain.   Endocrine: Negative.    Genitourinary: Negative for difficulty urinating.   Musculoskeletal: Positive for back pain. Negative for neck pain.   Skin: Negative for color change, pallor, rash and wound.   Allergic/Immunologic: Negative for environmental allergies and food allergies.   Neurological: Positive for dizziness, light-headedness and headaches.   Hematological: Bruises/bleeds easily.   Psychiatric/Behavioral: The patient is nervous/anxious.        Past Medical History:   Diagnosis Date   • Atrial fibrillation (CMS/Formerly [facility])    • CAD (coronary artery disease), native coronary artery 12/3/2016   • Centrilobular emphysema (CMS/HCC) 2016   • Diabetes (CMS/[facility])    • Hypercholesteremia    • Hypertension    • Osteoporosis        Past Surgical History:   Procedure Laterality Date   • CATARACT EXTRACTION     • CHOLECYSTECTOMY     • CYST REMOVAL         Family History   Problem Relation Age of Onset   • Hypertension Mother    • Heart disease Mother    • Diabetes Mother    • Hypertension Father    • Heart disease Father    • Diabetes Father    • Cancer Sister    • Heart disease Sister    • Hypertension Sister        Social History     Socioeconomic History   • Marital status:      Spouse name: Not on file   • Number of children: Not on file   • Years of education: Not on file   • Highest education level: Not on file   Tobacco Use   • Smoking status: Former Smoker     Packs/day: 1.00     Years: 10.00     Pack years: 10.00     Types: Cigarettes     Last attempt to quit: 2016     Years since quittin.9   • Smokeless tobacco: Never Used   Substance and Sexual Activity   • Alcohol use: No   • Drug use: No   • Sexual activity: Defer       Current  "Outpatient Medications:   •  albuterol (PROAIR HFA) 108 (90 BASE) MCG/ACT inhaler, Inhale 2 puffs Every 4 (Four) Hours As Needed for Wheezing., Disp: 1 inhaler, Rfl: 6  •  amLODIPine (NORVASC) 5 MG tablet, Take 5 mg by mouth Daily., Disp: , Rfl:   •  apixaban (ELIQUIS) 2.5 MG tablet tablet, Take 1 tablet by mouth Every 12 (Twelve) Hours., Disp: 60 tablet, Rfl: 11  •  atorvastatin (LIPITOR) 20 MG tablet, Take 2 tablets by mouth Every Night., Disp: 30 tablet, Rfl: 11  •  clopidogrel (PLAVIX) 75 MG tablet, Take 1 tablet by mouth Daily., Disp: 30 tablet, Rfl: 11  •  colestipol (COLESTID) 1 g tablet, , Disp: , Rfl:   •  diazePAM (VALIUM) 5 MG tablet, Take 5 mg by mouth 2 (Two) Times a Day As Needed for Anxiety., Disp: , Rfl:   •  gabapentin (NEURONTIN) 800 MG tablet, Take 800 mg by mouth 3 (Three) Times a Day., Disp: , Rfl:   •  HYDROcodone-acetaminophen (NORCO)  MG per tablet, Take 1 tablet by mouth 3 (Three) Times a Day As Needed for Moderate Pain ., Disp: , Rfl:   •  isosorbide mononitrate (IMDUR) 30 MG 24 hr tablet, Take 1 tablet by mouth Daily., Disp: 30 tablet, Rfl: 11  •  metFORMIN (GLUCOPHAGE) 500 MG tablet, Take 500 mg by mouth 2 (Two) Times a Day With Meals., Disp: , Rfl:   •  metoprolol tartrate (LOPRESSOR) 25 MG tablet, Take 1 tablet by mouth 2 (Two) Times a Day., Disp: 60 tablet, Rfl: 11  •  ondansetron ODT (ZOFRAN-ODT) 4 MG disintegrating tablet, Take 1 tablet by mouth 4 (Four) Times a Day., Disp: 15 tablet, Rfl: 0  •  SYMBICORT 160-4.5 MCG/ACT inhaler, , Disp: , Rfl:   •  vitamin D (ERGOCALCIFEROL) 41885 UNITS capsule capsule, Take 50,000 Units by mouth Every 7 (Seven) Days. Patient takes on friday, Disp: , Rfl:     Allergies:   Patient has no known allergies.    Vitals:  /80 (BP Location: Right arm, Patient Position: Sitting, Cuff Size: Adult)   Pulse 85   Ht 157.5 cm (62\")   Wt 86.9 kg (191 lb 9.6 oz)   SpO2 92%   BMI 35.04 kg/m²     Physical Exam   Constitutional: She is oriented to " person, place, and time. She appears well-developed and well-nourished. No distress.   hirtsuism   HENT:   Head: Normocephalic and atraumatic.   Nose: Nose normal.   Mouth/Throat: Oropharynx is clear and moist.   Eyes: Conjunctivae are normal. Right eye exhibits no discharge. Left eye exhibits no discharge. No scleral icterus.   Neck: Normal range of motion. No JVD present.   Cardiovascular: Normal rate and normal heart sounds. An irregularly irregular rhythm present. Exam reveals no gallop and no friction rub.   No murmur heard.  Pulmonary/Chest: Effort normal and breath sounds normal. No respiratory distress. She has no wheezes. She has no rales. She exhibits no tenderness.   Abdominal: Soft. Bowel sounds are normal. She exhibits no mass. There is tenderness (generalized, mild (worst in RUQ and periumbilical)).   Musculoskeletal: Normal range of motion. She exhibits edema (mild b/l LEs). She exhibits no deformity.   Neurological: She is alert and oriented to person, place, and time. Coordination normal.   Skin: Skin is warm and dry. No rash noted. She is not diaphoretic. No erythema.   Psychiatric: She has a normal mood and affect. Her behavior is normal. Judgment and thought content normal.   Vitals reviewed.      Assessment/Plan:  1. Incontinence of feces with fecal urgency    2. Constipation, unspecified constipation type    3. Nausea      Orders Placed This Encounter   Procedures   • XR Abdomen Flat & Upright     I believe her reports of diarrhea and fecal incontinence are likely related to chronic constipation made worse with opioid pain medications. She states that there has been improvement recently but she has been taking a medication (colestipol) which is known to make constipation worse. She will discontinue this medication. Take Miralax 17 g once daily that she has at home for now. She will have abdominal film completed but wants to wait until tomorrow to complete it. After this has been reviewed, she  will be called with results and recommendations which will likely be a bowel cleanse and constipation treatment. She was instructed to increase dietary fiber intake to 25-45g daily and a list of fiber foods was given. She has agreed to try to increase daily water intake and daily exercise as well.     We will request records from Dr. García (op/path) for review and interval recommendation for next CRCS.         Return in about 1 month (around 12/15/2018) for recheck constipation.      Electronically signed 11/15/2018 12:18 PM  Kiara Garcia PA-C, [facility]

## 2018-11-20 ENCOUNTER — HOSPITAL ENCOUNTER (OUTPATIENT)
Dept: GENERAL RADIOLOGY | Facility: HOSPITAL | Age: 73
Discharge: HOME | End: 2018-11-20
Admitting: PHYSICIAN ASSISTANT

## 2018-11-20 DIAGNOSIS — R15.2 INCONTINENCE OF FECES WITH FECAL URGENCY: ICD-10-CM

## 2018-11-20 DIAGNOSIS — R15.9 INCONTINENCE OF FECES WITH FECAL URGENCY: ICD-10-CM

## 2018-11-20 DIAGNOSIS — K59.00 CONSTIPATION, UNSPECIFIED CONSTIPATION TYPE: ICD-10-CM

## 2018-11-20 DIAGNOSIS — R11.0 NAUSEA: ICD-10-CM

## 2018-11-20 PROCEDURE — 74019 RADEX ABDOMEN 2 VIEWS: CPT | Performed by: RADIOLOGY

## 2018-11-20 PROCEDURE — 74019 RADEX ABDOMEN 2 VIEWS: CPT

## 2018-11-21 ENCOUNTER — TELEPHONE (OUTPATIENT)
Dept: GASTROENTEROLOGY | Facility: CLINIC | Age: 73
End: 2018-11-21

## 2018-11-21 NOTE — TELEPHONE ENCOUNTER
Please let patient know that she will need to complete a bowel cleanse due to finding of constipation on abdominal film. She should use Miralax bowel cleanse (15 doses in 32 oz then repeat 8 hours later). She has Miralax at home. The day after the cleanse, she should resume Miralax 17 g once dialy.

## 2019-01-04 ENCOUNTER — DOCUMENTATION (OUTPATIENT)
Dept: GASTROENTEROLOGY | Facility: CLINIC | Age: 74
End: 2019-01-04

## 2019-01-04 NOTE — PROGRESS NOTES
Jia called meghna to see if she would like to reschedule her appointment she missed in December. She called back and stated that she was doing really good at this time and did not need to make an appointment. She will call back later to reschedule when needed.

## 2019-02-28 ENCOUNTER — TELEPHONE (OUTPATIENT)
Dept: CARDIOLOGY | Facility: CLINIC | Age: 74
End: 2019-02-28

## 2019-02-28 NOTE — TELEPHONE ENCOUNTER
DR. VILLA CALLED CONCERNING SERGEI MENDOZA, SHE WAS ASKING HIM IF SHE COULD BE TAKEN OF HER ELIQUIST, OR PLAVIX.  SALAS FROM DR. KUHN OFFICE WANTS A CALL BACK [phone]

## 2020-11-12 NOTE — PLAN OF CARE
[facility]    PCP: Manjula Gerber MD    Chief Complaint   Patient presents with   • Follow-up   • Cardiomyopathy        HPI  Mamta Collins is a 79 year old female here today. *For routine follow-up visit related to her hypertrophic cardiomyopathy with nonsustained ventricular tachycardia syncope and previous ICD placement she is doing well she has had no active complaints in the way of chest pain shortness of breath or palpitations feels and looks generally quite well clinically has been under a lot of pressure with her 's illness and Covid    Past Medical History  Past Medical History:   Diagnosis Date   • Asymptomatic carotid artery stenosis, bilateral    • Dyslipidemia    • Elevated coronary artery calcium score     UFCT score 197.8 on Jaunary 12 and 998   • Essential hypertension    • Hypertrophic cardiomyopathy (CMS/HCC)    • ICD (implantable cardioverter-defibrillator) in place    • Syncope    • Ventricular tachycardia (CMS/HCC)        Past Surgical History  Past Surgical History:   Procedure Laterality Date   • Cardiac device check - in clinic      ICD ([facility]) 5/2018       Family History  Family History   Problem Relation Age of Onset   • Coronary Artery Disease Other         Significant for premature CAD   • Aneurysm Neg Hx         AAA       Social History  Social History     Tobacco Use   • Smoking status: Former Smoker   • Smokeless tobacco: Never Used   • Tobacco comment: Former tobacco use. used to smoke but quit 15 years ago   Substance Use Topics   • Alcohol use: Not on file     Comment: drinks rarely.   • Drug use: Not on file        Allergies  ALLERGIES:   Allergen Reactions   • Adhesive   (Environmental) Other (See Comments)     She developed \"bumps\" at the site    • Ace Inhibitors Cough     CLASS   • Latex RASH       Presenting Medications  Current Outpatient Medications   Medication Sig   • metFORMIN (GLUCOPHAGE) 500 MG tablet Take 500 mg  Problem: Patient Care Overview (Adult)  Goal: Plan of Care Review  Outcome: Ongoing (interventions implemented as appropriate)    01/11/17 1810 01/11/17 1906 01/11/17 1923   Coping/Psychosocial Response Interventions   Plan Of Care Reviewed With --  patient --    Patient Care Overview   Progress improving --  --    Outcome Evaluation   Outcome Summary/Follow up Plan --  --  pt currently flat in bed. pulses strong. no issues at this time       Goal: Adult Individualization and Mutuality  Outcome: Ongoing (interventions implemented as appropriate)  Goal: Discharge Needs Assessment  Outcome: Ongoing (interventions implemented as appropriate)    01/11/17 1810   Discharge Needs Assessment   Concerns To Be Addressed no discharge needs identified   Readmission Within The Last 30 Days no previous admission in last 30 days   Equipment Needed After Discharge none   Discharge Disposition still a patient   Current Health   Anticipated Changes Related to Illness none   Self-Care   Equipment Currently Used at Home none   Living Environment   Transportation Available car;family or friend will provide         Problem: Cardiac Catheterization with/without PCI (Adult)  Goal: Signs and Symptoms of Listed Potential Problems Will be Absent or Manageable (Cardiac Catheterization with/without PCI)  Outcome: Ongoing (interventions implemented as appropriate)    01/11/17 1923   Cardiac Catheterization with/without PCI   Problems Assessed (Cardiac Catheterization) all   Problems Present (Cardiac Catheterization) none            by mouth daily.   • metoPROLOL succinate (TOPROL-XL) 200 MG 24 hr tablet Take 1 tablet by mouth daily.   • ergocalciferol (DRISDOL) 1.25 mg (50,000 units) capsule Take 1 capsule by mouth 1 day a week.   • atorvastatin (LIPITOR) 40 MG tablet Take 40 mg by mouth daily. (patient is unsure of dose)   • olmesartan (BENICAR) 5 MG tablet Take 2 tablets by mouth daily.   • eplerenone (INSPRA) 25 MG tablet TAKE 1/2 TABLET BY MOUTH DAILY   • aspirin 81 MG tablet 1 tablet daily.   • DULoxetine (CYMBALTA) 60 MG capsule 1 capsule daily.   • latanoprost (XALATAN) 0.005 % ophthalmic solution    • levothyroxine (SYNTHROID) 100 MCG tablet 1 po daily   • TYLENOL PM EXTRA STRENGTH 500-25 MG Tab as needed     No current facility-administered medications for this visit.        Review of Systems  Review of Systems   Constitution: Negative for chills, fever, weight gain and weight loss.   HENT: Negative for hearing loss.    Eyes:        Patient denies significant visual changes   Cardiovascular: Negative for chest pain and claudication.        Negative except for what's indicated in HPI   Respiratory: Negative for cough and hemoptysis.    Hematologic/Lymphatic: Does not bruise/bleed easily.   Skin: Negative for rash and suspicious lesions.   Musculoskeletal: Negative for arthritis.   Gastrointestinal: Negative for hematochezia and melena.   Genitourinary: Negative for hematuria.   Neurological:        No localized deficits   Allergic/Immunologic: Negative for environmental allergies.        No new food allergies       Physical Exam  Visit Vitals  BP (!) 148/84   Pulse 64   Ht 5' 3\" (1.6 m)   Wt 79.8 kg (176 lb)   BMI 31.18 kg/m²     Vital signs were reviewed today.    Physical Exam   Constitutional: She appears healthy. No distress.   HENT:   Mouth/Throat: Oropharynx is clear.   Eyes: Pupils are equal, round, and reactive to light.   Neck: Normal range of motion. Neck supple.   Cardiovascular: Regular rhythm, S1 normal, S2 normal, normal  heart sounds, intact distal pulses and normal pulses.   Pulmonary/Chest: Effort normal and breath sounds normal. She has no rales.   Abdominal: Soft. Bowel sounds are normal.   Musculoskeletal: Normal range of motion.   Neurological: She is alert and oriented to person, place, and time.   Skin: Skin is warm and dry.       Recent Cardiology-Related Testing: No recent cardiac testing except for an echo which showed normal left ventricular function with wall thickness at the upper edge of normal with some atrial enlargement and trivial mitral regurgitation no change from previous echo    Assessment / Plan  1. Asymptomatic carotid artery stenosis, bilateral    2. Elevated coronary artery calcium score    3. Essential hypertension    4. Hypertrophic cardiomyopathy (CMS/HCC)    5. ICD (implantable cardioverter-defibrillator) in place    6. Syncope, unspecified syncope type    7. Ventricular tachycardia (CMS/HCC)    8. Pure hypercholesterolemia        Return in about 1 year (around 11/12/2021).     Overall assessment is a stable hypertrophic cardiomyopathy without clinical heart failure no arrhythmic symptoms being followed in ICD clinic she remains asymptomatic from her carotid disease and her hypertension lipids have been under control we will continue present approach revisit as required will be following up with the heart failure and electrophysiology clinics    Maverick Lane MD

## 2022-11-29 NOTE — Clinical Note
The DP pulses are +1 bilaterally. The PT pulses are +2 bilaterally.  Detail Level: Detailed Photo Preface (Leave Blank If You Do Not Want): Photographs were obtained today

## (undated) DEVICE — PK CATH CARD 70

## (undated) DEVICE — GW INQWIRE FC PTFE J/3MM .035 180

## (undated) DEVICE — ST INF PRI SMRTSTE 20DRP 2VLV 24ML 117

## (undated) DEVICE — KT NOVA WTRANSD 60 IN

## (undated) DEVICE — CATH F5 INF MP A2 100CM 2SH: Brand: INFINITI

## (undated) DEVICE — MINI TREK CORONARY DILATATION CATHETER 2.0 MM X 20 MM / RAPID-EXCHANGE: Brand: MINI TREK

## (undated) DEVICE — CATH F5 INF AR I MOD 100CM: Brand: INFINITI

## (undated) DEVICE — LN INJ CONTRST FLXCIL HP F/M LL 1200PSI10

## (undated) DEVICE — ADULT, RADIOTRANSPARENT ELEMENT, COMPATIBLE W/ ZOLL: Brand: DEFIBRILLATION ELECTRODES

## (undated) DEVICE — TREK CORONARY DILATATION CATHETER 2.50 MM X 15 MM / RAPID-EXCHANGE: Brand: TREK

## (undated) DEVICE — GC 5F 056 JL 4 STAND TIP: Brand: CORDIS

## (undated) DEVICE — NC TREK™ CORONARY DILATATION CATHETER 4.5 MM X 15 MM / RAPID-EXCHANGE: Brand: NC TREK™

## (undated) DEVICE — ADULT DISPOSABLE SINGLE-PATIENT USE PULSE OXIMETER SENSOR: Brand: NONIN

## (undated) DEVICE — NC TREK™ CORONARY DILATATION CATHETER 2.25 MM X 20 MM / RAPID-EXCHANGE: Brand: NC TREK™

## (undated) DEVICE — Device

## (undated) DEVICE — BG PRESS INFSR 500CC

## (undated) DEVICE — CATH F5 INF 3DRC 100CM: Brand: INFINITI

## (undated) DEVICE — 6F .070 JR 4 SH 100CM: Brand: VISTA BRITE TIP

## (undated) DEVICE — SHEATH INTRO SUPERSHEATH JWIRE .035 5F 11CM

## (undated) DEVICE — MICROPUNCTURE KIT

## (undated) DEVICE — NC TREK CORONARY DILATATION CATHETER 4.5 MM X 12 MM / RAPID-EXCHANGE: Brand: NC TREK

## (undated) DEVICE — GC 5F 056 JR 4 STAND TIP: Brand: BRITE TIP

## (undated) DEVICE — ELECTRD DEFIB M/FUNC STATPADZ PK/2

## (undated) DEVICE — DRSNG SURESITE WNDW 4X4.5

## (undated) DEVICE — ST EXT IV SMARTSITE 2VLV SP M LL 5ML IV1

## (undated) DEVICE — SHEATH INTRO SUPERSHEATH JWIRE .035 6F 11CM

## (undated) DEVICE — HI-TORQUE BALANCE MIDDLEWEIGHT GUIDE WIRE W/HYDROCOAT .014 STRAIGHT TIP 3.0 CM X 190 CM: Brand: HI-TORQUE BALANCE MIDDLEWEIGHT

## (undated) DEVICE — 6F .070 JR 4 100CM: Brand: CORDIS

## (undated) DEVICE — HI-TORQUE WHISPER MS GUIDE WIRE .014 STRAIGHT TIP 3.0 CM X 190 CM: Brand: HI-TORQUE WHISPER

## (undated) DEVICE — DRAPE, RADIAL, STERILE: Brand: MEDLINE

## (undated) DEVICE — 6F .070 AR 1 SH 100CM: Brand: VISTA BRITE TIP

## (undated) DEVICE — MINI TREK CORONARY DILATATION CATHETER 1.50 MM X 20 MM / RAPID-EXCHANGE: Brand: MINI TREK

## (undated) DEVICE — MYNXGRIP 6F/7F: Brand: MYNXGRIP

## (undated) DEVICE — GLIDESHEATH SLENDER STAINLESS STEEL KIT: Brand: GLIDESHEATH SLENDER

## (undated) DEVICE — CATH F6INF TL JR 4 100CM: Brand: INFINITI

## (undated) DEVICE — TR BAND RADIAL ARTERY COMPRESSION DEVICE: Brand: TR BAND

## (undated) DEVICE — CANNULA,OXY,ADULT,SUPER SOFT,W/14'TUB,UC: Brand: MEDLINE INDUSTRIES, INC.

## (undated) DEVICE — Device: Brand: MEDEX

## (undated) DEVICE — RADIFOCUS OPTITORQUE ANGIOGRAPHIC CATHETER: Brand: OPTITORQUE

## (undated) DEVICE — DEV INFL MONARCH 25W

## (undated) DEVICE — RUNWAY RADL W/TOP PAD

## (undated) DEVICE — GUIDELINER CATHETERS ARE INTENDED TO BE USED IN CONJUNCTION WITH GUIDE CATHETERS TO ACCESS DISCRETE REGIONS OF THE CORONARY AND/OR PERIPHERAL VASCULATURE, AND TO FACILITATE PLACEMENT OF INTERVENTIONAL DEVICES.: Brand: GUIDELINER® V3 CATHETER

## (undated) DEVICE — NC TREK CORONARY DILATATION CATHETER 3.0 MM X 15 MM / RAPID-EXCHANGE: Brand: NC TREK

## (undated) DEVICE — THE PRONTO LP CATHETER IS INDICATED FOR THE REMOVAL OF FRESH, SOFT EMBOLI AND THROMBI FROM VESSELS IN THE CORONARY AND PERIPHERAL SYSTEM.: Brand: PRONTO® LP EXTRACTION CATHETER

## (undated) DEVICE — RADIFOCUS GLIDEWIRE: Brand: GLIDEWIRE

## (undated) DEVICE — CATH F5 INF JL 3.5 100CM: Brand: INFINITI

## (undated) DEVICE — NC TREK CORONARY DILATATION CATHETER 2.5 MM X 8 MM / RAPID-EXCHANGE: Brand: NC TREK

## (undated) DEVICE — CATH F5 INF JR 4 100CM: Brand: INFINITI

## (undated) DEVICE — GW INQW FIX/CORE PTFE J/3MM .035 260CM